# Patient Record
Sex: FEMALE | Race: WHITE | NOT HISPANIC OR LATINO | Employment: OTHER | URBAN - METROPOLITAN AREA
[De-identification: names, ages, dates, MRNs, and addresses within clinical notes are randomized per-mention and may not be internally consistent; named-entity substitution may affect disease eponyms.]

---

## 2017-01-04 ENCOUNTER — TELEPHONE (OUTPATIENT)
Dept: UROLOGY | Facility: CLINIC | Age: 62
End: 2017-01-04

## 2017-01-04 NOTE — TELEPHONE ENCOUNTER
Patient rescheduled her appointment for tomorrow.  Her  was scheduled in her place due to urological issues.

## 2017-01-04 NOTE — TELEPHONE ENCOUNTER
----- Message from Emmie Lacy sent at 1/4/2017  9:15 AM CST -----  Contact: Patient  Patient called requesting that her  receive her appointment which is schedule for tomorrow. Please call back to confirm at [phone]. thanks

## 2017-01-11 ENCOUNTER — HISTORICAL (OUTPATIENT)
Dept: ADMINISTRATIVE | Facility: HOSPITAL | Age: 62
End: 2017-01-11

## 2017-01-11 LAB
ALBUMIN SERPL-MCNC: 4.4 G/DL (ref 3.1–4.7)
ALP SERPL-CCNC: 63 IU/L (ref 40–104)
ALT (SGPT): 32 IU/L (ref 3–33)
AST SERPL-CCNC: 25 IU/L (ref 10–40)
BASOPHILS NFR BLD: 0.1 K/UL (ref 0–0.2)
BASOPHILS NFR BLD: 1.4 %
BILIRUB SERPL-MCNC: 0.4 MG/DL (ref 0.3–1)
BUN SERPL-MCNC: 23 MG/DL (ref 8–20)
CALCIUM SERPL-MCNC: 9.4 MG/DL (ref 7.7–10.4)
CHLORIDE: 104 MMOL/L (ref 98–110)
CO2 SERPL-SCNC: 27.5 MMOL/L (ref 22.8–31.6)
CREATININE: 0.76 MG/DL (ref 0.6–1.4)
EOSINOPHIL NFR BLD: 0.2 K/UL (ref 0–0.7)
EOSINOPHIL NFR BLD: 2.7 %
ERYTHROCYTE [DISTWIDTH] IN BLOOD BY AUTOMATED COUNT: 14 % (ref 11.7–14.9)
GLUCOSE: 87 MG/DL (ref 70–99)
GRAN #: 3.4 K/UL (ref 1.4–6.5)
GRAN%: 54.7 %
HCT VFR BLD AUTO: 41.2 % (ref 36–48)
HGB BLD-MCNC: 13.1 G/DL (ref 12–15)
IMMATURE GRANS (ABS): 0 K/UL (ref 0–1)
IMMATURE GRANULOCYTES: 0.3 %
LYMPH #: 2.1 K/UL (ref 1.2–3.4)
LYMPH%: 32.8 %
MCH RBC QN AUTO: 28.2 PG (ref 25–35)
MCHC RBC AUTO-ENTMCNC: 31.8 G/DL (ref 31–36)
MCV RBC AUTO: 88.6 FL (ref 79–98)
MONO #: 0.5 K/UL (ref 0.1–0.6)
MONO%: 8.1 %
NUCLEATED RBCS: 0 %
PLATELET # BLD AUTO: 318 K/UL (ref 140–440)
PMV BLD AUTO: 11 FL (ref 8.8–12.7)
POTASSIUM SERPL-SCNC: 4.2 MMOL/L (ref 3.5–5)
PROT SERPL-MCNC: 7.3 G/DL (ref 6–8.2)
RBC # BLD AUTO: 4.65 M/UL (ref 3.5–5.5)
SODIUM: 140 MMOL/L (ref 134–144)
WBC # BLD AUTO: 6.3 K/UL (ref 5–10)

## 2017-02-15 ENCOUNTER — OFFICE VISIT (OUTPATIENT)
Dept: UROLOGY | Facility: CLINIC | Age: 62
End: 2017-02-15
Payer: COMMERCIAL

## 2017-02-15 VITALS
TEMPERATURE: 98 F | HEART RATE: 65 BPM | SYSTOLIC BLOOD PRESSURE: 149 MMHG | BODY MASS INDEX: 26.84 KG/M2 | WEIGHT: 167 LBS | DIASTOLIC BLOOD PRESSURE: 78 MMHG | HEIGHT: 66 IN

## 2017-02-15 DIAGNOSIS — N30.80 CYSTITIS CYSTICA: ICD-10-CM

## 2017-02-15 DIAGNOSIS — N30.00 ACUTE CYSTITIS WITHOUT HEMATURIA: Primary | ICD-10-CM

## 2017-02-15 LAB
BILIRUB SERPL-MCNC: ABNORMAL MG/DL
BLOOD URINE, POC: ABNORMAL
COLOR, POC UA: ABNORMAL
GLUCOSE UR QL STRIP: ABNORMAL
KETONES UR QL STRIP: ABNORMAL
LEUKOCYTE ESTERASE URINE, POC: ABNORMAL
NITRITE, POC UA: POSITIVE
PH, POC UA: 6
PROTEIN, POC: ABNORMAL
SPECIFIC GRAVITY, POC UA: 1.01
UROBILINOGEN, POC UA: ABNORMAL

## 2017-02-15 PROCEDURE — 99999 PR PBB SHADOW E&M-EST. PATIENT-LVL III: CPT | Mod: PBBFAC,,, | Performed by: UROLOGY

## 2017-02-15 PROCEDURE — 99213 OFFICE O/P EST LOW 20 MIN: CPT | Mod: 25,S$GLB,, | Performed by: UROLOGY

## 2017-02-15 PROCEDURE — 87077 CULTURE AEROBIC IDENTIFY: CPT

## 2017-02-15 PROCEDURE — 87086 URINE CULTURE/COLONY COUNT: CPT

## 2017-02-15 PROCEDURE — 87186 SC STD MICRODIL/AGAR DIL: CPT

## 2017-02-15 PROCEDURE — 81002 URINALYSIS NONAUTO W/O SCOPE: CPT | Mod: S$GLB,,, | Performed by: UROLOGY

## 2017-02-15 PROCEDURE — 87088 URINE BACTERIA CULTURE: CPT

## 2017-02-15 RX ORDER — AMOXICILLIN 500 MG
2 CAPSULE ORAL DAILY
COMMUNITY
End: 2019-05-02

## 2017-02-15 NOTE — PROGRESS NOTES
OFFICE NOTE    CHIEF COMPLAINT:  History of interstitial cystitis cystica.    Ms. Reeves is a 61-year-old female with the above diagnosis, being managed with   Myrbetriq 50 mg p.o. daily and also she takes Macrobid when she becomes   symptomatic.  She refers that the Macrobid is taking twice a day for a week and   usually the dysuria disappears unresolved.  The Pyridium is taken only for a   couple of days when needed.  At the present time, still has some symptoms with   nocturia x4.  During the day, she urinates at least six times a day, have an   occasional urgency and no gross hematuria.  The remaining of the medical and   surgical history have changed.  She recently underwent a right bunion surgery   and is recuperating and doing rehab for her right lower extremity.  The rest of   the medical and surgical history have changed and is well documented in the   medical record and that was reviewed during this visit.    The urinalysis today shows positive leukocytes, positive nitrites and a trace of   blood.  The urine is cloudy and have a strong odor.    I explained to the patient that these findings are suggesting that she may have   actually an active urinary tract infection.  I suggest that she do not take any   medication at the present time, since she is asymptomatic, that we do a urine   culture and sensitivity and we decide depending on that, what is the best   antibiotic for treating this infection.  In the meantime, she will be taking   Myrbetriq 50 mg daily.  After we resolve this infection, she will be followed up   in a year.  All the questions were answered at her satisfaction and she left   the office in satisfactory condition.    I spent approximately 30 minutes with Ms. Reeves and all the time was spent on   counseling and she left the office in satisfactory condition.      EOR/PN  dd: 02/15/2017 10:16:56 (CST)  td: 02/15/2017 13:09:11 (CST)  Doc ID   #[id number]  Job ID #[id number]    CC:

## 2017-02-15 NOTE — MR AVS SNAPSHOT
[facility]   [address]  [place] [address]  Phone: [phone]                  Eboni Reeves   2/15/2017 9:30 AM   Office Visit    Description:  Female : 1955   Provider:  Cb Noguera MD   Department:  [facility]           Reason for Visit     6 mth recheck           Diagnoses this Visit        Comments    Acute cystitis without hematuria    -  Primary     Cystitis cystica                To Do List           Goals (5 Years of Data)     None       These Medications        Disp Refills Start End    mirabegron 50 mg Tb24 90 tablet 3 2/15/2017 2/15/2018    Take 1 tablet (50 mg total) by mouth once daily. - Oral    Pharmacy: [id number] - [place], [place] - [address] #: [phone]         [facility]     [facility] On Call Nurse Care Line -  Assistance  Registered nurses in the [facility] Call Center provide clinical advisement, health education, appointment booking, and other advisory services.  Call for this free service at [phone].             Medications           Message regarding Medications     Verify the changes and/or additions to your medication regime listed below are the same as discussed with your clinician today.  If any of these changes or additions are incorrect, please notify your healthcare provider.        STOP taking these medications     alendronate (FOSAMAX) 70 MG tablet Take 70 mg by mouth every 7 days.     NAPROXEN SODIUM (ALEVE ORAL) Take 1 tablet by mouth daily as needed.           Verify that the below list of medications is an accurate representation of the medications you are currently taking.  If none reported, the list may be blank. If incorrect, please contact your healthcare provider. Carry this list with you in case of emergency.           Current Medications     BIFIDOBACTERIUM INFANTIS (ALIGN ORAL) Take 2 capsules by mouth once daily.    CALCIUM CARBONATE/VITAMIN D3 (VITAMIN D-3 ORAL) Take  "1 tablet by mouth once daily.      fish oil-omega-3 fatty acids 300-1,000 mg capsule Take 2 g by mouth once daily.    mirabegron 50 mg Tb24 Take 1 tablet (50 mg total) by mouth once daily.    multivitamin (ONE DAILY MULTIVITAMIN) per tablet Take 1 tablet by mouth once daily.     nitrofurantoin (MACRODANTIN) 100 MG capsule Take 1 capsule (100 mg total) by mouth nightly.    RESTASIS 0.05 % ophthalmic emulsion Place 1 drop into both eyes 2 (two) times daily.     cranberry extract 650 mg Cap Take 1 tablet by mouth nightly.           Clinical Reference Information           Your Vitals Were     BP Pulse Temp Height Weight BMI    149/78 (BP Location: Left arm, Patient Position: Sitting, BP Method: Automatic) 65 97.8 °F (36.6 °C) (Oral) 5' 6" (1.676 m) 75.8 kg (167 lb) 26.95 kg/m2      Blood Pressure          Most Recent Value    BP  (!)  149/78      Allergies as of 2/15/2017     No Known Allergies      Immunizations Administered on Date of Encounter - 2/15/2017     None      Orders Placed During Today's Visit      Normal Orders This Visit    POCT URINE DIPSTICK WITHOUT MICROSCOPE     Urine culture       Language Assistance Services     ATTENTION: Language assistance services are available, free of charge. Please call [phone].      ATENCIÓN: Si delfin sykes, tiene a jackson disposición servicios gratuitos de asistencia lingüística. Llame al [phone].     AKI Ý: N?u b?n nói Ti?ng Vi?t, có các d?ch v? h? tr? ngôn ng? mi?n phí dành cho b?n. G?i s? [phone].         [facility] complies with applicable Federal civil rights laws and does not discriminate on the basis of race, color, national origin, age, disability, or sex.        "

## 2017-02-18 LAB — BACTERIA UR CULT: NORMAL

## 2017-05-23 RX ORDER — MIRABEGRON 50 MG/1
TABLET, FILM COATED, EXTENDED RELEASE ORAL
Qty: 90 TABLET | Refills: 3 | Status: SHIPPED | OUTPATIENT
Start: 2017-05-23 | End: 2018-06-05 | Stop reason: SDUPTHER

## 2017-05-23 NOTE — TELEPHONE ENCOUNTER
----- Message from Rich Zeng sent at 5/23/2017 10:35 AM CDT -----  Contact: self   Patient need a refill on her medication you wrote for her please send to [facility], any questions please call back at [phone] (home)     [id number] - [place], [place] - [address] [address]  Phone: [phone] Fax: [phone]

## 2017-09-12 DIAGNOSIS — N30.80 CYSTITIS CYSTICA: ICD-10-CM

## 2017-09-12 DIAGNOSIS — N32.81 OVERACTIVE BLADDER: ICD-10-CM

## 2017-09-12 NOTE — TELEPHONE ENCOUNTER
----- Message from Aydin Yin sent at 9/12/2017 12:47 PM CDT -----  Contact: pt  Pt is requesting a refill on her Macrodantin 100mg  Call Back#[phone]  Thanks    [id number] - [place], [place] - [address] [address]  Phone: [phone] Fax: [phone]

## 2017-09-15 RX ORDER — NITROFURANTOIN (MACROCRYSTALS) 100 MG/1
100 CAPSULE ORAL NIGHTLY
Qty: 90 CAPSULE | Refills: 3 | Status: SHIPPED | OUTPATIENT
Start: 2017-09-15 | End: 2019-05-02

## 2018-06-05 RX ORDER — MIRABEGRON 50 MG/1
TABLET, FILM COATED, EXTENDED RELEASE ORAL
Qty: 90 TABLET | Refills: 3 | Status: SHIPPED | OUTPATIENT
Start: 2018-06-05 | End: 2019-06-18 | Stop reason: SDUPTHER

## 2019-05-02 ENCOUNTER — OFFICE VISIT (OUTPATIENT)
Dept: PODIATRY | Facility: CLINIC | Age: 64
End: 2019-05-02
Payer: COMMERCIAL

## 2019-05-02 VITALS
DIASTOLIC BLOOD PRESSURE: 87 MMHG | SYSTOLIC BLOOD PRESSURE: 136 MMHG | BODY MASS INDEX: 26.84 KG/M2 | HEIGHT: 66 IN | HEART RATE: 67 BPM | WEIGHT: 167 LBS

## 2019-05-02 DIAGNOSIS — G60.9 IDIOPATHIC PERIPHERAL NEUROPATHY: Primary | ICD-10-CM

## 2019-05-02 PROCEDURE — 99213 OFFICE O/P EST LOW 20 MIN: CPT | Mod: ,,, | Performed by: PODIATRIST

## 2019-05-02 PROCEDURE — 3008F PR BODY MASS INDEX (BMI) DOCUMENTED: ICD-10-PCS | Mod: ,,, | Performed by: PODIATRIST

## 2019-05-02 PROCEDURE — 99213 PR OFFICE/OUTPT VISIT, EST, LEVL III, 20-29 MIN: ICD-10-PCS | Mod: ,,, | Performed by: PODIATRIST

## 2019-05-02 PROCEDURE — 3008F BODY MASS INDEX DOCD: CPT | Mod: ,,, | Performed by: PODIATRIST

## 2019-05-02 RX ORDER — CHOLECALCIFEROL (VITAMIN D3) 125 MCG
CAPSULE ORAL
COMMUNITY
End: 2019-06-18

## 2019-05-02 NOTE — PROGRESS NOTES
[address] [facility] [address]  [place] [address]  Phone: [phone]   [phone]    Patient's PCP:Domo Gonzalez MD  Referring Provider: No ref. provider found    Subjective:      Chief Complaint:: Foot Problem (bilateral feet get cold)    HPI  Eboni Reeves is a 63 y.o. female who presents with a complaint of  Bilateral feet get really cold at night and when they start to warm up it is very painful lasting for 3-4 months. Onset of the symptoms was none.  Current symptoms include pain.When they start to warm up it is very painful, feels like it does when your hands are frozen and thaw out.  Aggravating factors are none. Symptoms have stayed the sme.Treatment to date have included aleve. Patients rates pain 8/10 on pain scale.        Vitals:    05/02/19 1101   BP: 136/87   Pulse: 67     Shoe Size:     Past Surgical History:   Procedure Laterality Date    APPENDECTOMY      BLADDER SURGERY      CYSTOSCOPY bladder distension N/A 4/26/2016    [facility]    DILATION-URETHRA  4/26/2016    [facility]    HYSTERECTOMY      partial hysterectomy    MANDIBLE FRACTURE SURGERY      TONSILLECTOMY      WRIST SURGERY      right wrist, had plate put in     Past Medical History:   Diagnosis Date    DVT (deep venous thrombosis)     Encounter for blood transfusion     Fx     RIGHT ANKLE    Stroke     TMJ (dislocation of temporomandibular joint)     Urinary tract infection      Family History   Problem Relation Age of Onset    Urolithiasis Father     Prostate cancer Paternal Uncle     Kidney cancer Neg Hx         Social History:   Marital Status:   Alcohol History:  reports that she drinks alcohol.  Tobacco History:  reports that she has never smoked. She does not have any smokeless tobacco history on file.  Drug History:  reports that she does not use drugs.    Review of patient's allergies indicates:   Allergen Reactions    Codeine  Nausea Only       Current Outpatient Medications   Medication Sig Dispense Refill    MYRBETRIQ 50 mg Tb24 TAKE ONE TABLET BY MOUTH ONCE DAILY 90 tablet 3    naproxen sodium (ALEVE) 220 mg Cap Aleve   prn       No current facility-administered medications for this visit.        Review of Systems      Objective:        Physical Exam:   Foot Exam    General  General Appearance: appears stated age and healthy   Orientation: alert and oriented to person, place, and time   Affect: appropriate   Gait: unimpaired       Right Foot/Ankle     Inspection and Palpation  Ecchymosis: none  Tenderness: none   Swelling: none   Arch: normal  Hammertoes: absent  Claw Toes: absent  Hallux valgus: no  Hallux limitus: no  Skin Exam: skin intact;     Neurovascular  Dorsalis pedis: 2+  Posterior tibial: 2+  Saphenous nerve sensation: normal  Tibial nerve sensation: normal  Superficial peroneal nerve sensation: normal  Deep peroneal nerve sensation: normal  Sural nerve sensation: normal    Muscle Strength  Ankle dorsiflexion: 5  Ankle plantar flexion: 5  Ankle inversion: 5  Ankle eversion: 5  Great toe extension: 5  Great toe flexion: 5    Comments  Toes on the right foot are cold to touch with no vasomotor changes or ulcerations.  Neurologically sensation is intact    Left Foot/Ankle      Inspection and Palpation  Ecchymosis: none  Tenderness: none   Swelling: none   Arch: normal  Hammertoes: absent  Claw toes: absent  Hallux valgus: no  Hallux limitus: no  Skin Exam: skin intact;     Neurovascular  Dorsalis pedis: 2+  Posterior tibial: 2+  Saphenous nerve sensation: normal  Tibial nerve sensation: normal  Superficial peroneal nerve sensation: normal  Deep peroneal nerve sensation: normal  Sural nerve sensation: normal    Muscle Strength  Ankle dorsiflexion: 5  Ankle plantar flexion: 5  Ankle inversion: 5  Ankle eversion: 5  Great toe extension: 5  Great toe flexion: 5    Comments  The toes on the left foot cold to touch with no  vasomotor changes or ulcerations.  Neurologically sensation is intact    Physical Exam   Cardiovascular:   Pulses:       Dorsalis pedis pulses are 2+ on the right side, and 2+ on the left side.        Posterior tibial pulses are 2+ on the right side, and 2+ on the left side.       Imaging:            Assessment:       1. Idiopathic peripheral neuropathy      Plan:   Idiopathic peripheral neuropathy    No treatment needed today. Patient will take Aleve at night to help with inflammatory changes that she feels at night we discussed future treatment with gabapentin or Neurontin) neurologically she becomes more uncomfortable. Also discussed NCV EMG neurological evaluation. Return as needed  No follow-ups on file.    Procedures - None    Counseling:   Patient  was made aware of inspecting their feet.  Patient was told to be aware of any breaks in the skin or redness.  With neuropathy, these areas are not recognized early due to the numbness.  I discussed the lab test and NCV EMG test and also the role of the neurologist in evaluating the patient.  I discussed Diabetes, lower back issues, metabolic disorders, systemic causes, chemotherapy, viatmain defiencey, heavy metal exposure, as some of the causes.  I also explained that as much as 40% of the time we can not find a cause.  I discussed different treatments available to control the symptoms but whcih may not cure the problem.  I provided patient education verbally regarding:   Patient diagnosis, treatment options, as well as alternatives, risks, and benefits.     This note was created using Dragon voice recognition software that occasionally misinterpreted phrases or words.

## 2019-06-12 RX ORDER — MIRABEGRON 50 MG/1
TABLET, FILM COATED, EXTENDED RELEASE ORAL
Qty: 90 TABLET | Refills: 3 | OUTPATIENT
Start: 2019-06-12

## 2019-06-18 ENCOUNTER — OFFICE VISIT (OUTPATIENT)
Dept: UROLOGY | Facility: CLINIC | Age: 64
End: 2019-06-18
Payer: COMMERCIAL

## 2019-06-18 VITALS
DIASTOLIC BLOOD PRESSURE: 80 MMHG | HEART RATE: 73 BPM | WEIGHT: 171.94 LBS | RESPIRATION RATE: 18 BRPM | SYSTOLIC BLOOD PRESSURE: 143 MMHG | BODY MASS INDEX: 27.63 KG/M2 | TEMPERATURE: 98 F | HEIGHT: 66 IN

## 2019-06-18 DIAGNOSIS — N30.10 IC (INTERSTITIAL CYSTITIS): Primary | ICD-10-CM

## 2019-06-18 DIAGNOSIS — R35.0 URINARY FREQUENCY: ICD-10-CM

## 2019-06-18 DIAGNOSIS — R39.15 URINARY URGENCY: ICD-10-CM

## 2019-06-18 DIAGNOSIS — N32.81 OAB (OVERACTIVE BLADDER): ICD-10-CM

## 2019-06-18 LAB
BILIRUB SERPL-MCNC: ABNORMAL MG/DL
BLOOD URINE, POC: ABNORMAL
COLOR, POC UA: YELLOW
GLUCOSE UR QL STRIP: ABNORMAL
KETONES UR QL STRIP: ABNORMAL
LEUKOCYTE ESTERASE URINE, POC: ABNORMAL
NITRITE, POC UA: ABNORMAL
PH, POC UA: 5
PROTEIN, POC: ABNORMAL
SPECIFIC GRAVITY, POC UA: 1.02
UROBILINOGEN, POC UA: 0.2

## 2019-06-18 PROCEDURE — 99999 PR PBB SHADOW E&M-EST. PATIENT-LVL III: ICD-10-PCS | Mod: PBBFAC,,, | Performed by: NURSE PRACTITIONER

## 2019-06-18 PROCEDURE — 99214 OFFICE O/P EST MOD 30 MIN: CPT | Mod: 25,S$GLB,, | Performed by: NURSE PRACTITIONER

## 2019-06-18 PROCEDURE — 99999 PR PBB SHADOW E&M-EST. PATIENT-LVL III: CPT | Mod: PBBFAC,,, | Performed by: NURSE PRACTITIONER

## 2019-06-18 PROCEDURE — 99214 PR OFFICE/OUTPT VISIT, EST, LEVL IV, 30-39 MIN: ICD-10-PCS | Mod: 25,S$GLB,, | Performed by: NURSE PRACTITIONER

## 2019-06-18 PROCEDURE — 3008F BODY MASS INDEX DOCD: CPT | Mod: CPTII,S$GLB,, | Performed by: NURSE PRACTITIONER

## 2019-06-18 PROCEDURE — 3008F PR BODY MASS INDEX (BMI) DOCUMENTED: ICD-10-PCS | Mod: CPTII,S$GLB,, | Performed by: NURSE PRACTITIONER

## 2019-06-18 PROCEDURE — 81002 POCT URINE DIPSTICK WITHOUT MICROSCOPE: ICD-10-PCS | Mod: S$GLB,,, | Performed by: NURSE PRACTITIONER

## 2019-06-18 PROCEDURE — 81002 URINALYSIS NONAUTO W/O SCOPE: CPT | Mod: S$GLB,,, | Performed by: NURSE PRACTITIONER

## 2019-06-18 NOTE — PROGRESS NOTES
[facility] Note  Staff: VIRGEN ByrdC    PCP: Dr. Domo Gonzalez    Chief Complaint: Annual Exam-Cystitis Cystica    Subjective:        HPI: Eboni Reeves is a 63 y.o. female presents today for her annual exam.  Pt has history of interstitial cystitis cystica and was previously established with Dr. Noguera.  Last ov was 02/15/2017.  Pt doing well today with no complaints voiced.    Ms. Reeves is a 63-year-old female with the above diagnosis, being managed with   Myrbetriq 50 mg p.o. daily and also she takes Macrobid when she becomes   symptomatic.  She refers that the Macrobid is taking twice a day for a week and   usually the dysuria disappears unresolved.  The Pyridium is taken only for a   couple of days when needed.    The rest of   the medical and surgical history have not changed and is well documented in the   medical record and that was reviewed during this visit.    Last procedure done by Chuckie was on 4/26/2016:  Cystoscopy with bladder hydrodistention and dilation of Urethra with no complications.     The urinalysis today shows trace of WBCs, all else is normal findings.     REVIEW OF SYSTEMS:  A comprehensive 10 system review was performed and is negative except as noted above in HPI    PMHx:  Past Medical History:   Diagnosis Date    DVT (deep venous thrombosis)     Encounter for blood transfusion     Fx     RIGHT ANKLE    Stroke     TMJ (dislocation of temporomandibular joint)     Urinary tract infection      PSHx:  Past Surgical History:   Procedure Laterality Date    APPENDECTOMY      BLADDER SURGERY      CYSTOSCOPY bladder distension N/A 4/26/2016    [facility]    DILATION-URETHRA  4/26/2016    [facility]    HYSTERECTOMY      partial hysterectomy    MANDIBLE FRACTURE SURGERY      TONSILLECTOMY      WRIST SURGERY      right wrist, had plate put in     Allergies:  Codeine    Medications:  reviewed   Objective:     Vitals:    06/18/19 0945   BP: (!) 143/80   Pulse: 73   Resp: 18   Temp: 98.4 °F (36.9 °C)     Physical Exam   Vitals reviewed.  Constitutional: She is oriented to person, place, and time. She appears well-developed and well-nourished.   HENT:   Head: Normocephalic and atraumatic.   Eyes: Conjunctivae and EOM are normal. Pupils are equal, round, and reactive to light.   Neck: Normal range of motion. Neck supple.   Cardiovascular: Normal rate, regular rhythm, normal heart sounds and intact distal pulses.    Pulmonary/Chest: Effort normal and breath sounds normal.   Abdominal: Soft. Bowel sounds are normal.   Musculoskeletal: Normal range of motion.   Neurological: She is alert and oriented to person, place, and time. She has normal reflexes.   Skin: Skin is warm and dry.     Psychiatric: She has a normal mood and affect. Her behavior is normal. Judgment and thought content normal.     Assessment:       1. IC (interstitial cystitis)    2. OAB (overactive bladder)    3. Urinary frequency    4. Urinary urgency          Plan:     Myrbetriq 50 mg daily refilled for pt during ov today.  F/u with me in one year.    [facility]: N/A    Monalisa Marin, ANN

## 2019-06-21 ENCOUNTER — TELEPHONE (OUTPATIENT)
Dept: PULMONOLOGY | Facility: CLINIC | Age: 64
End: 2019-06-21

## 2019-06-21 NOTE — TELEPHONE ENCOUNTER
Since April she has developed thsi cough an causing her not to be able to sleep at night . She does not have a appt until July 23

## 2019-06-21 NOTE — TELEPHONE ENCOUNTER
Pt scheduled for sooner appt with NP for cough   ----- Message from Malaika Bernard sent at 6/21/2019  2:57 PM CDT -----  Type:  Sooner Apoointment Request    Caller is requesting a sooner appointment.  Caller declined first available appointment listed below.  Caller will not accept being placed on the waitlist and is requesting a message be sent to doctor.    Name of Caller:  patient  When is the first available appointment?  7/25  Symptoms:  Bad cough since April   Best Call Back Number:  [phone]  Additional Information: requesting sooner appt

## 2019-06-27 ENCOUNTER — OFFICE VISIT (OUTPATIENT)
Dept: PULMONOLOGY | Facility: CLINIC | Age: 64
End: 2019-06-27
Payer: COMMERCIAL

## 2019-06-27 VITALS
HEIGHT: 66 IN | WEIGHT: 173.06 LBS | OXYGEN SATURATION: 96 % | DIASTOLIC BLOOD PRESSURE: 74 MMHG | HEART RATE: 65 BPM | SYSTOLIC BLOOD PRESSURE: 132 MMHG | BODY MASS INDEX: 27.81 KG/M2

## 2019-06-27 DIAGNOSIS — R05.9 COUGH IN ADULT: ICD-10-CM

## 2019-06-27 DIAGNOSIS — J45.21 MILD INTERMITTENT ASTHMA WITH ACUTE EXACERBATION: Primary | ICD-10-CM

## 2019-06-27 PROCEDURE — 3008F BODY MASS INDEX DOCD: CPT | Mod: CPTII,S$GLB,, | Performed by: NURSE PRACTITIONER

## 2019-06-27 PROCEDURE — 99205 PR OFFICE/OUTPT VISIT, NEW, LEVL V, 60-74 MIN: ICD-10-PCS | Mod: S$GLB,,, | Performed by: NURSE PRACTITIONER

## 2019-06-27 PROCEDURE — 3008F PR BODY MASS INDEX (BMI) DOCUMENTED: ICD-10-PCS | Mod: CPTII,S$GLB,, | Performed by: NURSE PRACTITIONER

## 2019-06-27 PROCEDURE — 99999 PR PBB SHADOW E&M-EST. PATIENT-LVL III: CPT | Mod: PBBFAC,,, | Performed by: NURSE PRACTITIONER

## 2019-06-27 PROCEDURE — 99205 OFFICE O/P NEW HI 60 MIN: CPT | Mod: S$GLB,,, | Performed by: NURSE PRACTITIONER

## 2019-06-27 PROCEDURE — 99999 PR PBB SHADOW E&M-EST. PATIENT-LVL III: ICD-10-PCS | Mod: PBBFAC,,, | Performed by: NURSE PRACTITIONER

## 2019-06-27 RX ORDER — MONTELUKAST SODIUM 10 MG/1
10 TABLET ORAL NIGHTLY
Qty: 30 TABLET | Refills: 11 | Status: SHIPPED | OUTPATIENT
Start: 2019-06-27 | End: 2019-07-27

## 2019-06-27 RX ORDER — PREDNISONE 20 MG/1
TABLET ORAL
Qty: 18 TABLET | Refills: 0 | Status: SHIPPED | OUTPATIENT
Start: 2019-06-27 | End: 2019-11-08

## 2019-06-27 RX ORDER — PHENTERMINE HYDROCHLORIDE 37.5 MG/1
TABLET ORAL
Refills: 3 | COMMUNITY
Start: 2019-06-24 | End: 2019-06-27

## 2019-06-27 RX ORDER — AZITHROMYCIN 500 MG/1
500 TABLET, FILM COATED ORAL DAILY
Qty: 3 TABLET | Refills: 0 | Status: SHIPPED | OUTPATIENT
Start: 2019-06-27 | End: 2019-11-08

## 2019-06-27 RX ORDER — BUDESONIDE AND FORMOTEROL FUMARATE DIHYDRATE 160; 4.5 UG/1; UG/1
2 AEROSOL RESPIRATORY (INHALATION) EVERY 12 HOURS
Qty: 1 INHALER | Refills: 11 | Status: SHIPPED | OUTPATIENT
Start: 2019-06-27 | End: 2019-07-29
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
## 2019-07-08 ENCOUNTER — HOSPITAL ENCOUNTER (OUTPATIENT)
Dept: RESPIRATORY THERAPY | Facility: HOSPITAL | Age: 64
Discharge: HOME | End: 2019-07-08
Attending: NURSE PRACTITIONER
Payer: COMMERCIAL

## 2019-07-08 DIAGNOSIS — J45.21 MILD INTERMITTENT ASTHMA WITH ACUTE EXACERBATION: ICD-10-CM

## 2019-07-08 DIAGNOSIS — R05.9 COUGH IN ADULT: ICD-10-CM

## 2019-07-08 LAB
BRPFT: ABNORMAL
DLCO ADJ PRE: 17.16 ML/(MIN*MMHG) (ref 17.76–29.22)
DLCO SINGLE BREATH LLN: 17.76
DLCO SINGLE BREATH PRE REF: 73.1 %
DLCO SINGLE BREATH REF: 23.49
DLCOC SBVA LLN: 3.07
DLCOC SBVA PRE REF: 92.9 %
DLCOC SBVA REF: 4.43
DLCOC SINGLE BREATH LLN: 17.76
DLCOC SINGLE BREATH PRE REF: 73.1 %
DLCOC SINGLE BREATH REF: 23.49
DLCOVA LLN: 3.07
DLCOVA PRE REF: 92.9 %
DLCOVA PRE: 4.12 ML/(MIN*MMHG*L) (ref 3.07–5.8)
DLCOVA REF: 4.43
DLVAADJ PRE: 4.12 ML/(MIN*MMHG*L) (ref 3.07–5.8)
ERVN2 LLN: 0.76
ERVN2 PRE REF: 51.7 %
ERVN2 PRE: 0.39 L (ref 0.76–0.76)
ERVN2 REF: 0.76
FEF 25 75 CHG: 18.5 %
FEF 25 75 LLN: 1.08
FEF 25 75 POST REF: 80.7 %
FEF 25 75 PRE REF: 68.1 %
FEF 25 75 REF: 2.2
FET100 CHG: -1.5 %
FEV1 CHG: 1.2 %
FEV1 FVC CHG: 3.7 %
FEV1 FVC LLN: 66
FEV1 FVC POST REF: 97.4 %
FEV1 FVC PRE REF: 93.9 %
FEV1 FVC REF: 79
FEV1 LLN: 1.9
FEV1 POST REF: 81.8 %
FEV1 PRE REF: 80.9 %
FEV1 REF: 2.55
FRCN2 LLN: 2
FRCN2 PRE REF: 77.1 %
FRCN2 REF: 2.83
FVC CHG: -2.5 %
FVC LLN: 2.45
FVC POST REF: 83.4 %
FVC PRE REF: 85.5 %
FVC REF: 3.28
IVC PRE: 2.63 L (ref 2.45–4.11)
IVC SINGLE BREATH LLN: 2.45
IVC SINGLE BREATH PRE REF: 80.1 %
IVC SINGLE BREATH REF: 3.28
MVV LLN: 83
MVV PRE REF: 74.9 %
MVV REF: 97
PEF CHG: 3.6 %
PEF LLN: 4.57
PEF POST REF: 83 %
PEF PRE REF: 80.2 %
PEF REF: 6.39
POST FEF 25 75: 1.78 L/S (ref 1.08–3.32)
POST FET 100: 8.73 SEC
POST FEV1 FVC: 76.54 % (ref 66.14–91.04)
POST FEV1: 2.09 L (ref 1.9–3.21)
POST FVC: 2.73 L (ref 2.45–4.11)
POST PEF: 5.31 L/S (ref 4.57–8.22)
PRE DLCO: 17.16 ML/(MIN*MMHG) (ref 17.76–29.22)
PRE FEF 25 75: 1.5 L/S (ref 1.08–3.32)
PRE FET 100: 8.86 SEC
PRE FEV1 FVC: 73.8 % (ref 66.14–91.04)
PRE FEV1: 2.07 L (ref 1.9–3.21)
PRE FRC N2: 2.18 L
PRE FVC: 2.8 L (ref 2.45–4.11)
PRE MVV: 73 L/MIN (ref 82.82–112.06)
PRE PEF: 5.13 L/S (ref 4.57–8.22)
RVN2 LLN: 1.49
RVN2 PRE REF: 86.3 %
RVN2 PRE: 1.78 L (ref 1.49–2.64)
RVN2 REF: 2.06
RVN2TLCN2 LLN: 31.13
RVN2TLCN2 PRE REF: 95.5 %
RVN2TLCN2 PRE: 38.88 % (ref 31.13–50.31)
RVN2TLCN2 REF: 40.72
TLCN2 LLN: 4.31
TLCN2 PRE REF: 86.5 %
TLCN2 PRE: 4.58 L (ref 4.31–6.29)
TLCN2 REF: 5.3
VA PRE: 4.17 L (ref 5.15–5.15)
VA SINGLE BREATH LLN: 5.15
VA SINGLE BREATH PRE REF: 81 %
VA SINGLE BREATH REF: 5.15
VCMAXN2 LLN: 2.45
VCMAXN2 PRE REF: 85.5 %
VCMAXN2 PRE: 2.8 L (ref 2.45–4.11)
VCMAXN2 REF: 3.28

## 2019-07-08 PROCEDURE — 94729 DIFFUSING CAPACITY: CPT

## 2019-07-08 PROCEDURE — 94729 PR C02/MEMBANE DIFFUSE CAPACITY: ICD-10-PCS | Mod: 26,,, | Performed by: INTERNAL MEDICINE

## 2019-07-08 PROCEDURE — 94727 GAS DIL/WSHOT DETER LNG VOL: CPT

## 2019-07-08 PROCEDURE — 94727 PR PULM FUNCTION TEST BY GAS: ICD-10-PCS | Mod: 26,,, | Performed by: INTERNAL MEDICINE

## 2019-07-08 PROCEDURE — 94727 GAS DIL/WSHOT DETER LNG VOL: CPT | Mod: 26,,, | Performed by: INTERNAL MEDICINE

## 2019-07-08 PROCEDURE — 94729 DIFFUSING CAPACITY: CPT | Mod: 26,,, | Performed by: INTERNAL MEDICINE

## 2019-07-08 PROCEDURE — 94060 PR EVAL OF BRONCHOSPASM: ICD-10-PCS | Mod: 26,,, | Performed by: INTERNAL MEDICINE

## 2019-07-08 PROCEDURE — 94060 EVALUATION OF WHEEZING: CPT | Mod: 26,,, | Performed by: INTERNAL MEDICINE

## 2019-07-08 PROCEDURE — 94060 EVALUATION OF WHEEZING: CPT

## 2019-07-09 ENCOUNTER — TELEPHONE (OUTPATIENT)
Dept: PULMONOLOGY | Facility: CLINIC | Age: 64
End: 2019-07-09

## 2019-07-09 NOTE — TELEPHONE ENCOUNTER
Notified patient of results, verbalized understanding. No further action is needed.----- Message from Denisha Lucia NP sent at 7/8/2019  4:48 PM CDT -----  Pulmonary function test is normal.

## 2019-07-29 ENCOUNTER — OFFICE VISIT (OUTPATIENT)
Dept: PULMONOLOGY | Facility: CLINIC | Age: 64
End: 2019-07-29
Payer: COMMERCIAL

## 2019-07-29 VITALS
BODY MASS INDEX: 27.77 KG/M2 | OXYGEN SATURATION: 98 % | WEIGHT: 172.81 LBS | HEIGHT: 66 IN | HEART RATE: 64 BPM | DIASTOLIC BLOOD PRESSURE: 76 MMHG | SYSTOLIC BLOOD PRESSURE: 135 MMHG

## 2019-07-29 DIAGNOSIS — G47.33 OBSTRUCTIVE SLEEP APNEA: ICD-10-CM

## 2019-07-29 DIAGNOSIS — J45.30 MILD PERSISTENT ASTHMA WITHOUT COMPLICATION: Primary | ICD-10-CM

## 2019-07-29 PROBLEM — J45.20 MILD INTERMITTENT ASTHMA WITHOUT COMPLICATION: Status: ACTIVE | Noted: 2019-06-27

## 2019-07-29 PROCEDURE — 99213 PR OFFICE/OUTPT VISIT, EST, LEVL III, 20-29 MIN: ICD-10-PCS | Mod: S$GLB,,, | Performed by: NURSE PRACTITIONER

## 2019-07-29 PROCEDURE — 99999 PR PBB SHADOW E&M-EST. PATIENT-LVL III: CPT | Mod: PBBFAC,,, | Performed by: NURSE PRACTITIONER

## 2019-07-29 PROCEDURE — 3008F PR BODY MASS INDEX (BMI) DOCUMENTED: ICD-10-PCS | Mod: CPTII,S$GLB,, | Performed by: NURSE PRACTITIONER

## 2019-07-29 PROCEDURE — 99999 PR PBB SHADOW E&M-EST. PATIENT-LVL III: ICD-10-PCS | Mod: PBBFAC,,, | Performed by: NURSE PRACTITIONER

## 2019-07-29 PROCEDURE — 3008F BODY MASS INDEX DOCD: CPT | Mod: CPTII,S$GLB,, | Performed by: NURSE PRACTITIONER

## 2019-07-29 PROCEDURE — 99213 OFFICE O/P EST LOW 20 MIN: CPT | Mod: S$GLB,,, | Performed by: NURSE PRACTITIONER

## 2019-07-29 RX ORDER — MIRABEGRON 50 MG/1
TABLET, FILM COATED, EXTENDED RELEASE ORAL
Refills: 12 | COMMUNITY
Start: 2019-07-22 | End: 2020-03-11 | Stop reason: SDUPTHER

## 2019-07-29 RX ORDER — MONTELUKAST SODIUM 10 MG/1
10 TABLET ORAL NIGHTLY
COMMUNITY
End: 2020-06-30 | Stop reason: SDUPTHER

## 2019-07-29 RX ORDER — FLUTICASONE FUROATE AND VILANTEROL 200; 25 UG/1; UG/1
1 POWDER RESPIRATORY (INHALATION) DAILY
Qty: 1 EACH | Refills: 11 | Status: SHIPPED | OUTPATIENT
Start: 2019-07-29 | End: 2020-08-13 | Stop reason: SDUPTHER

## 2019-07-29 RX ORDER — ALBUTEROL SULFATE 90 UG/1
2 AEROSOL, METERED RESPIRATORY (INHALATION) EVERY 6 HOURS PRN
COMMUNITY
End: 2021-02-25 | Stop reason: SDUPTHER
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
## 2019-07-29 NOTE — PATIENT INSTRUCTIONS
Asthma symptoms have improved with Symbicort but still persistent problem  Will stop Symbicort and start Breo 200 1 puff once a day every day    Pulmonary function test shows no obstructive disease but you are only absorbing 73% of the oxygen you inhale.    Will contact Cardiologist for copy of recent blood work.    Continue Asthma action plan.

## 2019-10-29 ENCOUNTER — OFFICE VISIT (OUTPATIENT)
Dept: PULMONOLOGY | Facility: CLINIC | Age: 64
End: 2019-10-29
Payer: COMMERCIAL

## 2019-10-29 VITALS
HEIGHT: 66 IN | BODY MASS INDEX: 28.7 KG/M2 | DIASTOLIC BLOOD PRESSURE: 62 MMHG | SYSTOLIC BLOOD PRESSURE: 124 MMHG | HEART RATE: 68 BPM | WEIGHT: 178.56 LBS | OXYGEN SATURATION: 97 %

## 2019-10-29 DIAGNOSIS — G47.33 OBSTRUCTIVE SLEEP APNEA: ICD-10-CM

## 2019-10-29 DIAGNOSIS — J45.30 MILD PERSISTENT ASTHMA WITHOUT COMPLICATION: Primary | ICD-10-CM

## 2019-10-29 PROCEDURE — 99999 PR PBB SHADOW E&M-EST. PATIENT-LVL III: CPT | Mod: PBBFAC,,, | Performed by: NURSE PRACTITIONER

## 2019-10-29 PROCEDURE — 99999 PR PBB SHADOW E&M-EST. PATIENT-LVL III: ICD-10-PCS | Mod: PBBFAC,,, | Performed by: NURSE PRACTITIONER

## 2019-10-29 PROCEDURE — 3008F BODY MASS INDEX DOCD: CPT | Mod: CPTII,S$GLB,, | Performed by: NURSE PRACTITIONER

## 2019-10-29 PROCEDURE — 99213 OFFICE O/P EST LOW 20 MIN: CPT | Mod: S$GLB,,, | Performed by: NURSE PRACTITIONER

## 2019-10-29 PROCEDURE — 3008F PR BODY MASS INDEX (BMI) DOCUMENTED: ICD-10-PCS | Mod: CPTII,S$GLB,, | Performed by: NURSE PRACTITIONER

## 2019-10-29 PROCEDURE — 99213 PR OFFICE/OUTPT VISIT, EST, LEVL III, 20-29 MIN: ICD-10-PCS | Mod: S$GLB,,, | Performed by: NURSE PRACTITIONER

## 2019-10-29 RX ORDER — AZITHROMYCIN 500 MG/1
500 TABLET, FILM COATED ORAL DAILY
Qty: 3 TABLET | Refills: 2 | Status: SHIPPED | OUTPATIENT
Start: 2019-10-29 | End: 2019-11-01

## 2019-10-29 RX ORDER — PREDNISONE 20 MG/1
TABLET ORAL
Qty: 18 TABLET | Refills: 1 | Status: SHIPPED | OUTPATIENT
Start: 2019-10-29 | End: 2021-02-25 | Stop reason: SDUPTHER

## 2019-10-29 NOTE — LETTER
October 29, 2019      Domo Gonzalez MD  [address]  [address]  [facility]  [place] [address]           [facility] [place] [facility]  [address]  [place] [address]  Phone: [phone]  Fax: [phone]          Patient: Eboni Reeves   MR Number: [id number]   YOB: 1955   Date of Visit: 10/29/2019       Dear Dr. Domo Gonzalez:    Thank you for referring Eboni Reeves to me for evaluation. Attached you will find relevant portions of my assessment and plan of care.    If you have questions, please do not hesitate to call me. I look forward to following Eboni Reeves along with you.    Sincerely,    Denisha Lucia, KATIA    Enclosure  CC:  No Recipients    If you would like to receive this communication electronically, please contact [email or web address] or [phone] to request more information on [facility] access.    For providers and/or their staff who would like to refer a patient to [facility], please contact us through our one-stop-shop provider referral line, [facility], at [phone].    If you feel you have received this communication in error or would no longer like to receive these types of communications, please e-mail [email or web address]

## 2019-10-29 NOTE — PATIENT INSTRUCTIONS
Get Flu and pneumonia vaccine    Wait 6 weeks and get TDAP (Pertussis)     Continue current Asthma medication regiment    Asthma Action plan    Azithromycin 500 mg 1 pill for three days for yellow or green mucous    Prednisone 20 mg pills, Take one pill a day for three days, repeat for shortness of breath or wheeze, or cough when albuterol nebulizer does not resolve problems    Albuterol Inhaler 1-2 puffs every 4 hours, for cough or shortness of breath    Use CPAP nightly, notify clinic when you receive the machine in your home to schedule your 32 day compliance report.
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
## 2019-11-04 ENCOUNTER — TELEPHONE (OUTPATIENT)
Dept: PULMONOLOGY | Facility: CLINIC | Age: 64
End: 2019-11-04

## 2019-11-04 NOTE — TELEPHONE ENCOUNTER
Gave patient appointment for Jan 6 2020 cpap compliance.    ----- Message from Sheldon Howard sent at 11/4/2019 12:51 PM CST -----  Contact: Pt  Pt called to say she received her Cpap machine today.    Pt can be reached at [phone].    Thanks

## 2019-11-08 ENCOUNTER — APPOINTMENT (OUTPATIENT)
Dept: LAB | Facility: HOSPITAL | Age: 64
End: 2019-11-08
Attending: NURSE PRACTITIONER
Payer: COMMERCIAL

## 2019-11-08 ENCOUNTER — OFFICE VISIT (OUTPATIENT)
Dept: UROLOGY | Facility: CLINIC | Age: 64
End: 2019-11-08
Payer: COMMERCIAL

## 2019-11-08 VITALS
WEIGHT: 178.81 LBS | HEIGHT: 66 IN | RESPIRATION RATE: 18 BRPM | TEMPERATURE: 98 F | DIASTOLIC BLOOD PRESSURE: 75 MMHG | HEART RATE: 69 BPM | BODY MASS INDEX: 28.74 KG/M2 | SYSTOLIC BLOOD PRESSURE: 146 MMHG

## 2019-11-08 DIAGNOSIS — R35.0 URINARY FREQUENCY: ICD-10-CM

## 2019-11-08 DIAGNOSIS — N30.10 IC (INTERSTITIAL CYSTITIS): Primary | ICD-10-CM

## 2019-11-08 DIAGNOSIS — R39.15 URINARY URGENCY: ICD-10-CM

## 2019-11-08 DIAGNOSIS — N32.81 OAB (OVERACTIVE BLADDER): ICD-10-CM

## 2019-11-08 LAB
BACTERIA #/AREA URNS HPF: ABNORMAL /HPF
BILIRUB SERPL-MCNC: ABNORMAL MG/DL
BILIRUB UR QL STRIP: NEGATIVE
BLOOD URINE, POC: ABNORMAL
CLARITY UR: CLEAR
COLOR UR: YELLOW
COLOR, POC UA: YELLOW
GLUCOSE UR QL STRIP: ABNORMAL
GLUCOSE UR QL STRIP: NEGATIVE
HGB UR QL STRIP: NEGATIVE
KETONES UR QL STRIP: ABNORMAL
KETONES UR QL STRIP: NEGATIVE
LEUKOCYTE ESTERASE UR QL STRIP: ABNORMAL
LEUKOCYTE ESTERASE URINE, POC: ABNORMAL
MICROSCOPIC COMMENT: ABNORMAL
NITRITE UR QL STRIP: NEGATIVE
NITRITE, POC UA: ABNORMAL
PH UR STRIP: 6 [PH] (ref 5–8)
PH, POC UA: 6
PROT UR QL STRIP: NEGATIVE
PROTEIN, POC: ABNORMAL
SP GR UR STRIP: 1.02 (ref 1–1.03)
SPECIFIC GRAVITY, POC UA: 1.02
URN SPEC COLLECT METH UR: ABNORMAL
UROBILINOGEN UR STRIP-ACNC: NEGATIVE EU/DL
UROBILINOGEN, POC UA: 0.2
WBC #/AREA URNS HPF: 75 /HPF (ref 0–5)

## 2019-11-08 PROCEDURE — 99214 PR OFFICE/OUTPT VISIT, EST, LEVL IV, 30-39 MIN: ICD-10-PCS | Mod: 25,S$GLB,, | Performed by: NURSE PRACTITIONER

## 2019-11-08 PROCEDURE — 87147 CULTURE TYPE IMMUNOLOGIC: CPT

## 2019-11-08 PROCEDURE — 3008F BODY MASS INDEX DOCD: CPT | Mod: CPTII,S$GLB,, | Performed by: NURSE PRACTITIONER

## 2019-11-08 PROCEDURE — 81000 URINALYSIS NONAUTO W/SCOPE: CPT

## 2019-11-08 PROCEDURE — 99999 PR PBB SHADOW E&M-EST. PATIENT-LVL IV: CPT | Mod: PBBFAC,,, | Performed by: NURSE PRACTITIONER

## 2019-11-08 PROCEDURE — 99214 OFFICE O/P EST MOD 30 MIN: CPT | Mod: 25,S$GLB,, | Performed by: NURSE PRACTITIONER

## 2019-11-08 PROCEDURE — 99999 PR PBB SHADOW E&M-EST. PATIENT-LVL IV: ICD-10-PCS | Mod: PBBFAC,,, | Performed by: NURSE PRACTITIONER

## 2019-11-08 PROCEDURE — 87086 URINE CULTURE/COLONY COUNT: CPT

## 2019-11-08 PROCEDURE — 87088 URINE BACTERIA CULTURE: CPT

## 2019-11-08 PROCEDURE — 81002 URINALYSIS NONAUTO W/O SCOPE: CPT | Mod: S$GLB,,, | Performed by: NURSE PRACTITIONER

## 2019-11-08 PROCEDURE — 3008F PR BODY MASS INDEX (BMI) DOCUMENTED: ICD-10-PCS | Mod: CPTII,S$GLB,, | Performed by: NURSE PRACTITIONER

## 2019-11-08 PROCEDURE — 81002 POCT URINE DIPSTICK WITHOUT MICROSCOPE: ICD-10-PCS | Mod: S$GLB,,, | Performed by: NURSE PRACTITIONER
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
## 2019-11-10 LAB — BACTERIA UR CULT: ABNORMAL

## 2019-11-13 ENCOUNTER — TELEPHONE (OUTPATIENT)
Dept: UROLOGY | Facility: CLINIC | Age: 64
End: 2019-11-13

## 2019-11-13 RX ORDER — CEPHALEXIN 500 MG/1
500 CAPSULE ORAL EVERY 12 HOURS
Qty: 20 CAPSULE | Refills: 0 | Status: SHIPPED | OUTPATIENT
Start: 2019-11-13 | End: 2019-11-23

## 2019-11-13 RX ORDER — FLUCONAZOLE 150 MG/1
150 TABLET ORAL DAILY
Qty: 3 TABLET | Refills: 0 | Status: SHIPPED | OUTPATIENT
Start: 2019-11-13 | End: 2019-11-16

## 2019-11-13 NOTE — TELEPHONE ENCOUNTER
Patient informed of antibiotic called in to treat infection and to take diflucan to prevent yeast infection. Patient voiced understanding.

## 2019-11-13 NOTE — TELEPHONE ENCOUNTER
Culture showed infection, please advise.     ----- Message from Elodia Farah sent at 11/13/2019  9:24 AM CST -----  Contact: Eboni skinner  Type:  Test Results    Who Called:  Eboni  Name of Test (Lab/Mammo/Etc):  labs  Date of Test:  friday  Ordering Provider:  Gonzales  Where the test was performed:  n/a  Best Call Back Number:  [phone]  Additional Information:  Pls call pt regarding her results

## 2019-11-13 NOTE — TELEPHONE ENCOUNTER
Please call pt and inform of following:    Keflex 500 mg BID x 7 days and Diflucan for prevention of yeast infection was sent into her pharmacy listed on file.    I apologize, I never received the results through flag/result notes.  Thanks.

## 2020-01-03 ENCOUNTER — TELEPHONE (OUTPATIENT)
Dept: PULMONOLOGY | Facility: CLINIC | Age: 65
End: 2020-01-03

## 2020-01-28 ENCOUNTER — OFFICE VISIT (OUTPATIENT)
Dept: PULMONOLOGY | Facility: CLINIC | Age: 65
End: 2020-01-28
Payer: COMMERCIAL

## 2020-01-28 VITALS
SYSTOLIC BLOOD PRESSURE: 124 MMHG | DIASTOLIC BLOOD PRESSURE: 74 MMHG | OXYGEN SATURATION: 95 % | WEIGHT: 179.25 LBS | BODY MASS INDEX: 28.93 KG/M2 | HEART RATE: 75 BPM

## 2020-01-28 DIAGNOSIS — G47.33 OBSTRUCTIVE SLEEP APNEA: ICD-10-CM

## 2020-01-28 DIAGNOSIS — J45.30 MILD PERSISTENT ASTHMA WITHOUT COMPLICATION: ICD-10-CM

## 2020-01-28 DIAGNOSIS — J01.00 ACUTE MAXILLARY SINUSITIS, RECURRENCE NOT SPECIFIED: Primary | ICD-10-CM

## 2020-01-28 DIAGNOSIS — R05.9 COUGH: ICD-10-CM

## 2020-01-28 DIAGNOSIS — R09.89 CHRONIC SINUS COMPLAINTS: ICD-10-CM

## 2020-01-28 PROCEDURE — 99999 PR PBB SHADOW E&M-EST. PATIENT-LVL III: CPT | Mod: PBBFAC,,, | Performed by: NURSE PRACTITIONER

## 2020-01-28 PROCEDURE — 3008F PR BODY MASS INDEX (BMI) DOCUMENTED: ICD-10-PCS | Mod: CPTII,S$GLB,, | Performed by: NURSE PRACTITIONER

## 2020-01-28 PROCEDURE — 3008F BODY MASS INDEX DOCD: CPT | Mod: CPTII,S$GLB,, | Performed by: NURSE PRACTITIONER

## 2020-01-28 PROCEDURE — 99999 PR PBB SHADOW E&M-EST. PATIENT-LVL III: ICD-10-PCS | Mod: PBBFAC,,, | Performed by: NURSE PRACTITIONER

## 2020-01-28 PROCEDURE — 99213 PR OFFICE/OUTPT VISIT, EST, LEVL III, 20-29 MIN: ICD-10-PCS | Mod: S$GLB,,, | Performed by: NURSE PRACTITIONER

## 2020-01-28 PROCEDURE — 99213 OFFICE O/P EST LOW 20 MIN: CPT | Mod: S$GLB,,, | Performed by: NURSE PRACTITIONER

## 2020-01-28 RX ORDER — FLUTICASONE PROPIONATE 50 MCG
1 SPRAY, SUSPENSION (ML) NASAL DAILY
Qty: 16 G | Refills: 11 | Status: SHIPPED | OUTPATIENT
Start: 2020-01-28 | End: 2021-03-25 | Stop reason: SDUPTHER

## 2020-01-28 RX ORDER — BENZONATATE 100 MG/1
100 CAPSULE ORAL 3 TIMES DAILY PRN
Qty: 30 CAPSULE | Refills: 0 | Status: SHIPPED | OUTPATIENT
Start: 2020-01-28 | End: 2020-02-07
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
## 2020-01-28 NOTE — PATIENT INSTRUCTIONS
Continue current Asthma medication regiment  Add flonase 1 spray each nostril daily    Use benzonate pills to help with cough    Continue to use CPAP nightly for obstructive sleep apnea.

## 2020-02-10 ENCOUNTER — OFFICE VISIT (OUTPATIENT)
Dept: ORTHOPEDICS | Facility: CLINIC | Age: 65
End: 2020-02-10
Payer: COMMERCIAL

## 2020-02-10 VITALS
DIASTOLIC BLOOD PRESSURE: 75 MMHG | HEART RATE: 65 BPM | SYSTOLIC BLOOD PRESSURE: 133 MMHG | WEIGHT: 178 LBS | HEIGHT: 66 IN | BODY MASS INDEX: 28.61 KG/M2

## 2020-02-10 DIAGNOSIS — M75.82 ROTATOR CUFF TENDONITIS, LEFT: ICD-10-CM

## 2020-02-10 DIAGNOSIS — S83.282A OTHER TEAR OF LATERAL MENISCUS OF LEFT KNEE AS CURRENT INJURY, INITIAL ENCOUNTER: ICD-10-CM

## 2020-02-10 DIAGNOSIS — M25.522 LEFT ELBOW PAIN: ICD-10-CM

## 2020-02-10 DIAGNOSIS — M25.512 ACUTE PAIN OF LEFT SHOULDER: ICD-10-CM

## 2020-02-10 DIAGNOSIS — M25.562 ACUTE PAIN OF LEFT KNEE: Primary | ICD-10-CM

## 2020-02-10 DIAGNOSIS — M77.12 LATERAL EPICONDYLITIS OF LEFT ELBOW: ICD-10-CM

## 2020-02-10 PROCEDURE — 20610 DRAIN/INJ JOINT/BURSA W/O US: CPT | Mod: LT,S$GLB,, | Performed by: ORTHOPAEDIC SURGERY

## 2020-02-10 PROCEDURE — 3008F BODY MASS INDEX DOCD: CPT | Mod: S$GLB,,, | Performed by: ORTHOPAEDIC SURGERY

## 2020-02-10 PROCEDURE — 99204 PR OFFICE/OUTPT VISIT, NEW, LEVL IV, 45-59 MIN: ICD-10-PCS | Mod: 25,S$GLB,, | Performed by: ORTHOPAEDIC SURGERY

## 2020-02-10 PROCEDURE — 20610 LARGE JOINT ASPIRATION/INJECTION: L KNEE: ICD-10-PCS | Mod: LT,S$GLB,, | Performed by: ORTHOPAEDIC SURGERY

## 2020-02-10 PROCEDURE — 99204 OFFICE O/P NEW MOD 45 MIN: CPT | Mod: 25,S$GLB,, | Performed by: ORTHOPAEDIC SURGERY

## 2020-02-10 PROCEDURE — 3008F PR BODY MASS INDEX (BMI) DOCUMENTED: ICD-10-PCS | Mod: S$GLB,,, | Performed by: ORTHOPAEDIC SURGERY

## 2020-02-10 RX ORDER — METHYLPREDNISOLONE ACETATE 40 MG/ML
40 INJECTION, SUSPENSION INTRA-ARTICULAR; INTRALESIONAL; INTRAMUSCULAR; SOFT TISSUE
Status: DISCONTINUED | OUTPATIENT
Start: 2020-02-10 | End: 2020-02-10 | Stop reason: HOSPADM

## 2020-02-10 RX ADMIN — METHYLPREDNISOLONE ACETATE 40 MG: 40 INJECTION, SUSPENSION INTRA-ARTICULAR; INTRALESIONAL; INTRAMUSCULAR; SOFT TISSUE at 09:02

## 2020-02-10 NOTE — PROCEDURES
Large Joint Aspiration/Injection: L knee  Performed by: Lavon Franz Jr., MD  Authorized by: Lavon Franz Jr., MD  Date/Time: 2/10/2020 9:15 AM    Consent Done?:  Yes (Verbal)  Indications:  pain  Timeout: Immediately prior to procedure a time out was called to verify the correct patient, procedure, equipment, support staff and site/side marked as required.  Prep:Patient was prepped and draped in the usual sterile fashion.  Procedure site marked: Yes     Anesthesia  Local anesthesia used  Anesthetic: lidocaine 1% without epinephrine    Details:   Needle size: 25 G    Ultrasonic Guidance for needle placement: No    Medications: 40 mg methylPREDNISolone acetate 40 mg/mL  Patient tolerance:  patient tolerated the procedure well with no immediate complications    Large Joint Aspiration/Injection: L subcoracoid bursa  Performed by: Lavon Franz Jr., MD  Authorized by: Lavon Franz Jr., MD  Date/Time: 2/10/2020 9:15 AM    Consent Done?:  Yes (Verbal)  Indications:   Timeout: Immediately prior to procedure a time out was called to verify the correct patient, procedure, equipment, support staff and site/side marked as required.  Prep:Patient was prepped and draped in the usual sterile fashion.  Procedure site marked: Yes     Anesthesia  Local anesthesia used  Anesthetic: lidocaine 1% without epinephrine    Details:   Needle size: 25 G    Ultrasonic Guidance for needle placement: No    Medications: 40 mg methylPREDNISolone acetate 40 mg/mL  Patient tolerance:  patient tolerated the procedure well with no immediate complications

## 2020-02-10 NOTE — PROGRESS NOTES
[facility]    Subjective:     Chief Complaint:   Chief Complaint   Patient presents with    Left Elbow - Pain     Left elbow pain x 4 weeks. States that she has pain that radiates to her shoulder and down her arm. States that her shoulder does bother her but only when she lifts something heavy.     Left Knee - Pain     Left knee pain x 4 weeks. States that the only time she had pain is when she kneels down on the knee, she has a sharp pain in the knee.        Past Medical History:   Diagnosis Date    Cystitis, interstitial     DVT (deep venous thrombosis)     Encounter for blood transfusion     Fx     RIGHT ANKLE    Stroke     TMJ (dislocation of temporomandibular joint)     Urinary tract infection        Past Surgical History:   Procedure Laterality Date    APPENDECTOMY      BLADDER SURGERY      HYSTERECTOMY      partial hysterectomy    MANDIBLE FRACTURE SURGERY      TONSILLECTOMY      WRIST SURGERY      right wrist, had plate put in       Current Outpatient Medications   Medication Sig    fluticasone furoate-vilanterol (BREO ELLIPTA) 200-25 mcg/dose DsDv diskus inhaler Inhale 1 puff into the lungs once daily. Controller    fluticasone propionate (FLONASE) 50 mcg/actuation nasal spray 1 spray (50 mcg total) by Each Nostril route once daily.    montelukast (SINGULAIR) 10 mg tablet Take 10 mg by mouth every evening.    MYRBETRIQ 50 mg Tb24     albuterol (PROVENTIL/VENTOLIN HFA) 90 mcg/actuation inhaler Inhale 2 puffs into the lungs every 6 (six) hours as needed for Wheezing. Rescue    predniSONE (DELTASONE) 20 MG tablet Take one pill a day for three days, repeat for shortness of breath (Patient not taking: Reported on 1/28/2020)     No current facility-administered medications for this visit.        Review of patient's allergies indicates:  No Known Allergies    Family History   Problem Relation Age of Onset    Urolithiasis Father     Prostate cancer Paternal Uncle     Kidney  cancer Neg Hx        Social History     Socioeconomic History    Marital status:      Spouse name: Not on file    Number of children: Not on file    Years of education: Not on file    Highest education level: Not on file   Occupational History    Not on file   Social Needs    Financial resource strain: Not on file    Food insecurity:     Worry: Not on file     Inability: Not on file    Transportation needs:     Medical: Not on file     Non-medical: Not on file   Tobacco Use    Smoking status: Never Smoker   Substance and Sexual Activity    Alcohol use: Yes    Drug use: No    Sexual activity: Not on file   Lifestyle    Physical activity:     Days per week: Not on file     Minutes per session: Not on file    Stress: Not on file   Relationships    Social connections:     Talks on phone: Not on file     Gets together: Not on file     Attends Zoroastrian service: Not on file     Active member of club or organization: Not on file     Attends meetings of clubs or organizations: Not on file     Relationship status: Not on file   Other Topics Concern    Not on file   Social History Narrative    Not on file       History of present illness:  She is having issues with left shoulder left elbow and left knee.  Often when some sharp pain left knee was she Lerma kneels.  Last month or so no prior treatment. No real effusion. She has at on daily basis.  She also has left shoulder pain with overhead activities and lifting items.  Again last month or so.  And she is having some pain into the left elbow at times.  Sometimes it is related to her shoulder sometimes it is not.      Review of Systems:    Constitution: Negative for chills, fever, and sweats.  Negative for unexplained weight loss.    HENT:  Negative for headaches and blurry vision.    Cardiovascular:Negative for chest pain or irregular heart beat. Negative for hypertension.    Respiratory:  Negative for cough and shortness of  breath.    Gastrointestinal: Negative for abdominal pain, heartburn, melena, nausea, and vomitting.    Genitourinary:  Negative bladder incontinence and dysuria.    Musculoskeletal:  See HPI for details.     Neurological: Negative for numbness.    Psychiatric/Behavioral: Negative for depression.  The patient is not nervous/anxious.      Endocrine: Negative for polyuria    Hematologic/Lymphatic: Negative for bleeding problem.  Does not bruise/bleed easily.    Skin: Negative for poor would healing and rash    Objective:      Physical Examination:    Vital Signs:    Vitals:    02/10/20 0931   BP: 133/75   Pulse: 65       Body mass index is 28.73 kg/m².    This a well-developed, well nourished patient in no acute distress.  They are alert and oriented and cooperative to examination.        So physical exam left shoulder shows that there clearly is a painful arc.  There is no pain with apprehension. She has good passive range of motion. She does have a bit of crepitation which is somewhat painful when we do that arc.  Right shoulder has no painful arc no crepitation.  Than left elbow does have some mild tenderness at the lateral epicondyle and some pain with .  The left knee has no effusion is very minimal atrophy. No effusion.  There is tenderness at the lateral joint line.  There is no obvious neuroma of the infrapatellar branch.  The patella is not tender with no crepitation or plica.  There is mild pain with lateral [place].  No instability left knee  Pertinent New Results:    XRAY Report / Interpretation:   AP lateral left elbows unremarkable.  No acute or chronic findings.  AP left shoulder is unremarkable with no acute or chronic findings and normal subdeltoid space. The left knee AP lateral sunrise shows minimal lateral attitude to the patella on sunrise with no major arthritic change.  The standing AP view shows no significant arthritic change in weight-bearing surface. No acute findings in left knee.   Signature    Assessment/Plan:      So my impression is I think she has some rotator cuff tendinitis left shoulder with without a small tear.  Our plan is steroid injection left shoulder.  1 cc Depo-Medrol 5 cc lidocaine anterior left shoulder.   I think she has tennis elbow early left elbow our plan is tennis elbow strap and ice.  Use strap for at least next month.  May end up with an injection somewhere.    The left knee has a small lateral meniscus tear. I do not think she has a lot of arthritis in the knee cap although it only bothers her kneeling so the may be some pain in the patella with kneeling so we did an anterolateral injection in the region of the infrapatellar branch  intra-articular left knee 1 cc Depo-Medrol 5 cc lidocaine.  Return if we have persistence or recurrence of left knee or left shoulder pain.  Work not an issue      This note was created using Dragon voice recognition software that occasionally misinterpreted phrases or words.

## 2020-02-27 ENCOUNTER — OFFICE VISIT (OUTPATIENT)
Dept: PODIATRY | Facility: CLINIC | Age: 65
End: 2020-02-27
Payer: COMMERCIAL

## 2020-02-27 VITALS
WEIGHT: 178 LBS | DIASTOLIC BLOOD PRESSURE: 84 MMHG | BODY MASS INDEX: 28.61 KG/M2 | SYSTOLIC BLOOD PRESSURE: 132 MMHG | HEART RATE: 68 BPM | HEIGHT: 66 IN

## 2020-02-27 DIAGNOSIS — M79.674 TOE PAIN, RIGHT: ICD-10-CM

## 2020-02-27 DIAGNOSIS — B35.1 OM (ONYCHOMYCOSIS): Primary | ICD-10-CM

## 2020-02-27 PROCEDURE — 3008F BODY MASS INDEX DOCD: CPT | Mod: S$GLB,,, | Performed by: PODIATRIST

## 2020-02-27 PROCEDURE — 99213 OFFICE O/P EST LOW 20 MIN: CPT | Mod: S$GLB,,, | Performed by: PODIATRIST

## 2020-02-27 PROCEDURE — 99213 PR OFFICE/OUTPT VISIT, EST, LEVL III, 20-29 MIN: ICD-10-PCS | Mod: S$GLB,,, | Performed by: PODIATRIST

## 2020-02-27 PROCEDURE — 3008F PR BODY MASS INDEX (BMI) DOCUMENTED: ICD-10-PCS | Mod: S$GLB,,, | Performed by: PODIATRIST

## 2020-02-27 RX ORDER — FLUCONAZOLE 200 MG/1
200 TABLET ORAL WEEKLY
Qty: 28 TABLET | Refills: 0 | Status: SHIPPED | OUTPATIENT
Start: 2020-02-27 | End: 2020-09-04

## 2020-02-27 NOTE — PROGRESS NOTES
[address] [facility] [address]  [place] [address]  Phone: [phone]   [phone]    Patient's PCP:Domo Gonzalez MD  Referring Provider: No ref. provider found    Subjective:      Chief Complaint:: Toe Pain (right big toe pain)    HPI  Eboni Reeves is a 64 y.o. female who presents with a complaint of  Pain right first toenail lasting for a couple of months. Onset of the symptoms was none.  Current symptoms include pain.  Aggravating factors are touch and shoes. Symptoms have remained the same.Treatment to date have included none. Patients rates pain 5/10 on pain scale.        Vitals:    02/27/20 1525   BP: 132/84   Pulse: 68     Shoe Size: 9    Past Surgical History:   Procedure Laterality Date    APPENDECTOMY      BLADDER SURGERY      HYSTERECTOMY      partial hysterectomy    MANDIBLE FRACTURE SURGERY      TONSILLECTOMY      WRIST SURGERY      right wrist, had plate put in     Past Medical History:   Diagnosis Date    Cystitis, interstitial     DVT (deep venous thrombosis)     Encounter for blood transfusion     Fx     RIGHT ANKLE    Stroke     TMJ (dislocation of temporomandibular joint)     Urinary tract infection      Family History   Problem Relation Age of Onset    Urolithiasis Father     Prostate cancer Paternal Uncle     Kidney cancer Neg Hx         Social History:   Marital Status:   Alcohol History:  reports that she drinks alcohol.  Tobacco History:  reports that she has never smoked. She does not have any smokeless tobacco history on file.  Drug History:  reports that she does not use drugs.    Review of patient's allergies indicates:  No Known Allergies    Current Outpatient Medications   Medication Sig Dispense Refill    albuterol (PROVENTIL/VENTOLIN HFA) 90 mcg/actuation inhaler Inhale 2 puffs into the lungs every 6 (six) hours as needed for Wheezing. Rescue      fluticasone furoate-vilanterol (BREO ELLIPTA) 200-25 mcg/dose DsDv diskus inhaler Inhale 1 puff into  the lungs once daily. Controller 1 each 11    fluticasone propionate (FLONASE) 50 mcg/actuation nasal spray 1 spray (50 mcg total) by Each Nostril route once daily. 16 g 11    montelukast (SINGULAIR) 10 mg tablet Take 10 mg by mouth every evening.      MYRBETRIQ 50 mg Tb24   12    predniSONE (DELTASONE) 20 MG tablet Take one pill a day for three days, repeat for shortness of breath 18 tablet 1    fluconazole (DIFLUCAN) 200 MG Tab Take 1 tablet (200 mg total) by mouth once a week. for 28 doses 28 tablet 0     No current facility-administered medications for this visit.        Review of Systems      Objective:        Physical Exam:   Foot Exam    General  General Appearance: appears stated age and healthy   Orientation: alert and oriented to person, place, and time   Affect: appropriate   Gait: unimpaired       Right Foot/Ankle     Inspection and Palpation  Ecchymosis: none  Tenderness: (With pressure on toenail right 1st toe)  Swelling: none   Arch: pes planus  Right foot bunion: Well-healed bunion surgery with good range of motion of 1st MPJ.  Skin Exam: skin intact; (The right 1st toenail slightly incurvated with increased thickness and yellow discoloration consistent with onychomycosis.  No ulceration no purulent drainage no fluctuance)    Neurovascular  Dorsalis pedis: 2+  Posterior tibial: 2+  Saphenous nerve sensation: normal  Tibial nerve sensation: normal  Superficial peroneal nerve sensation: normal  Deep peroneal nerve sensation: normal  Sural nerve sensation: normal          Physical Exam   Cardiovascular:   Pulses:       Dorsalis pedis pulses are 2+ on the right side.        Posterior tibial pulses are 2+ on the right side.   Musculoskeletal:        Feet:        Imaging:            Assessment:       1. OM (onychomycosis) - Left Foot    2. Toe pain, right      Plan:   OM (onychomycosis) - Left Foot  -     fluconazole (DIFLUCAN) 200 MG Tab; Take 1 tablet (200 mg total) by mouth once a week. for 28  doses  Dispense: 28 tablet; Refill: 0    Toe pain, right  -     fluconazole (DIFLUCAN) 200 MG Tab; Take 1 tablet (200 mg total) by mouth once a week. for 28 doses  Dispense: 28 tablet; Refill: 0    Fungal infection of toenails explained. Treatment options including no treatment, periodic debridement, topical medications, oral medications, and removal of the nail were discussed, as well as success rates and risks of recurrence. We agreed on oral medication, will order the necessary lab work Diflucan 200 mg 1 pill weekly for 28 weeks return as needed.  Follow up if symptoms worsen or fail to improve.    Procedures - None    Counseling:     I provided patient education verbally regarding:   Patient diagnosis, treatment options, as well as alternatives, risks, and benefits.     This note was created using Dragon voice recognition software that occasionally misinterpreted phrases or words.

## 2020-03-11 ENCOUNTER — APPOINTMENT (OUTPATIENT)
Dept: LAB | Facility: HOSPITAL | Age: 65
End: 2020-03-11
Attending: NURSE PRACTITIONER
Payer: COMMERCIAL

## 2020-03-11 ENCOUNTER — OFFICE VISIT (OUTPATIENT)
Dept: UROLOGY | Facility: CLINIC | Age: 65
End: 2020-03-11
Payer: COMMERCIAL

## 2020-03-11 VITALS
HEART RATE: 67 BPM | BODY MASS INDEX: 28.34 KG/M2 | DIASTOLIC BLOOD PRESSURE: 80 MMHG | RESPIRATION RATE: 18 BRPM | WEIGHT: 176.38 LBS | SYSTOLIC BLOOD PRESSURE: 151 MMHG | HEIGHT: 66 IN | TEMPERATURE: 98 F

## 2020-03-11 DIAGNOSIS — N30.80 CYSTITIS CYSTICA: ICD-10-CM

## 2020-03-11 DIAGNOSIS — N39.0 URINARY TRACT INFECTION WITHOUT HEMATURIA, SITE UNSPECIFIED: Primary | ICD-10-CM

## 2020-03-11 LAB
BACTERIA #/AREA URNS HPF: NORMAL /HPF
BILIRUB SERPL-MCNC: NEGATIVE MG/DL
BILIRUB UR QL STRIP: NEGATIVE
BLOOD URINE, POC: NEGATIVE
CLARITY UR: CLEAR
COLOR UR: YELLOW
COLOR, POC UA: ABNORMAL
GLUCOSE UR QL STRIP: NEGATIVE
GLUCOSE UR QL STRIP: NEGATIVE
HGB UR QL STRIP: NEGATIVE
KETONES UR QL STRIP: NEGATIVE
KETONES UR QL STRIP: NEGATIVE
LEUKOCYTE ESTERASE UR QL STRIP: ABNORMAL
LEUKOCYTE ESTERASE URINE, POC: ABNORMAL
MICROSCOPIC COMMENT: NORMAL
NITRITE UR QL STRIP: NEGATIVE
NITRITE, POC UA: NEGATIVE
PH UR STRIP: 7 [PH] (ref 5–8)
PH, POC UA: 7.5
PROT UR QL STRIP: NEGATIVE
PROTEIN, POC: NEGATIVE
RBC #/AREA URNS HPF: 2 /HPF (ref 0–4)
SP GR UR STRIP: 1.01 (ref 1–1.03)
SPECIFIC GRAVITY, POC UA: 1.02
URN SPEC COLLECT METH UR: ABNORMAL
UROBILINOGEN UR STRIP-ACNC: NEGATIVE EU/DL
UROBILINOGEN, POC UA: 0.2
WBC #/AREA URNS HPF: 1 /HPF (ref 0–5)

## 2020-03-11 PROCEDURE — 99214 OFFICE O/P EST MOD 30 MIN: CPT | Mod: 25,S$GLB,, | Performed by: NURSE PRACTITIONER

## 2020-03-11 PROCEDURE — 81002 URINALYSIS NONAUTO W/O SCOPE: CPT | Mod: S$GLB,,, | Performed by: NURSE PRACTITIONER

## 2020-03-11 PROCEDURE — 99214 PR OFFICE/OUTPT VISIT, EST, LEVL IV, 30-39 MIN: ICD-10-PCS | Mod: 25,S$GLB,, | Performed by: NURSE PRACTITIONER

## 2020-03-11 PROCEDURE — 81002 POCT URINE DIPSTICK WITHOUT MICROSCOPE: ICD-10-PCS | Mod: S$GLB,,, | Performed by: NURSE PRACTITIONER

## 2020-03-11 PROCEDURE — 87086 URINE CULTURE/COLONY COUNT: CPT

## 2020-03-11 PROCEDURE — 81000 URINALYSIS NONAUTO W/SCOPE: CPT

## 2020-03-11 PROCEDURE — 3008F BODY MASS INDEX DOCD: CPT | Mod: CPTII,S$GLB,, | Performed by: NURSE PRACTITIONER

## 2020-03-11 PROCEDURE — 99999 PR PBB SHADOW E&M-EST. PATIENT-LVL IV: ICD-10-PCS | Mod: PBBFAC,,, | Performed by: NURSE PRACTITIONER

## 2020-03-11 PROCEDURE — 3008F PR BODY MASS INDEX (BMI) DOCUMENTED: ICD-10-PCS | Mod: CPTII,S$GLB,, | Performed by: NURSE PRACTITIONER

## 2020-03-11 PROCEDURE — 99999 PR PBB SHADOW E&M-EST. PATIENT-LVL IV: CPT | Mod: PBBFAC,,, | Performed by: NURSE PRACTITIONER

## 2020-03-11 RX ORDER — CEPHALEXIN 500 MG/1
500 CAPSULE ORAL EVERY 12 HOURS
Qty: 28 CAPSULE | Refills: 0 | Status: SHIPPED | OUTPATIENT
Start: 2020-03-11 | End: 2020-03-25

## 2020-03-11 RX ORDER — MIRABEGRON 50 MG/1
50 TABLET, FILM COATED, EXTENDED RELEASE ORAL DAILY
Qty: 90 TABLET | Refills: 3 | Status: SHIPPED | OUTPATIENT
Start: 2020-03-11 | End: 2021-03-28

## 2020-03-11 NOTE — PROGRESS NOTES
[facility] Note  Staff: VIRGEN ByrdC    PCP: Dr. Domo Gonzalez    Chief Complaint: UTI Symptoms    Subjective:        HPI: Eboni Reeves is a 64 y.o. female presents today with c/o dysuria, urinary urgency and frequency complaints x one week.  Pt believes she has a UTI at this time.  Pt was last seen by me on 11/08/2019 for urinary incontinence issues.    PT  HX:  Pt has history of interstitial cystitis cystica and was previously established with Dr. Noguera.  Last ov with MD was 02/15/2017.  Pt doing well today with no complaints voiced.     Ms. Reeves is a 64-year-old female with the above diagnosis, being managed with Myrbetriq 50 mg p.o. daily and also she takes Macrobid when she becomes symptomatic.  She refers that the Macrobid is taking twice a day for a week and usually the dysuria disappears unresolved.  The Pyridium is taken only for a couple of days when needed.    The rest of   the medical and surgical history have not changed and is well documented in the   medical record and that was reviewed during this visit.     Last procedure done by Chuckie was on 4/26/2016:  Cystoscopy with bladder hydrodistention and dilation of Urethra with no complications.     UA today shows:   Moderate leukocytes, all other normal findings.  Pt denies gross hematuria at this time.    REVIEW OF SYSTEMS:  A comprehensive 10 system review was performed and is negative except as noted above in HPI     PMHx:  Past Medical History:   Diagnosis Date    Cystitis, interstitial     DVT (deep venous thrombosis)     Encounter for blood transfusion     Fx     RIGHT ANKLE    Stroke     TMJ (dislocation of temporomandibular joint)     Urinary tract infection      PSHx:  Past Surgical History:   Procedure Laterality Date    APPENDECTOMY      BLADDER SURGERY      HYSTERECTOMY      partial hysterectomy    MANDIBLE FRACTURE SURGERY      TONSILLECTOMY      WRIST SURGERY      right wrist, had plate  put in     Allergies:  Patient has no known allergies.    Medications: reviewed   Objective:     Vitals:    03/11/20 1123   BP: (!) 151/80   Pulse: 67   Resp: 18   Temp: 97.9 °F (36.6 °C)     Physical Exam   Vitals reviewed.  Constitutional: She is oriented to person, place, and time. She appears well-developed and well-nourished.   HENT:   Head: Normocephalic and atraumatic.   Eyes: Conjunctivae and EOM are normal. Pupils are equal, round, and reactive to light.   Neck: Normal range of motion. Neck supple.   Cardiovascular: Normal rate, regular rhythm, normal heart sounds and intact distal pulses.    Pulmonary/Chest: Effort normal and breath sounds normal.   Abdominal: Soft. Bowel sounds are normal.   Musculoskeletal: Normal range of motion.   Neurological: She is alert and oriented to person, place, and time. She has normal reflexes.   Skin: Skin is warm and dry.     Psychiatric: She has a normal mood and affect. Her behavior is normal. Judgment and thought content normal.     LABS REVIEW:  UA today:   Color: Cloudy, Yellow  Spec. Grav.  1.020  PH  7.5  Moderate leukocytes  Negative for nitrates, protein, glucose, ketones, urobili, bili, and blood.    Assessment:       1. Urinary tract infection without hematuria, site unspecified    2. Cystitis cystica          Plan:     1. UA, UA Micro, and Urine culture to be performed.  2. In meantime, Keflex 500 mg BID x 14 days prescribed to pt today for possible UTI.  3.  Pt was instructed to continue the Myrbetriq 50 mg daily as previously prescribed.    F/u--We will contact this pt after we receive her results.    [place]: None    ANN Norris

## 2020-03-11 NOTE — PATIENT INSTRUCTIONS
Urinary Tract Infections in Women    Urinary tract infections (UTIs) are most often caused by bacteria (germs). These bacteria enter the urinary tract. The bacteria may come from outside the body. Or they may travel from the skin outside the rectum or vagina into the urethra. Female anatomy makes it easy for bacteria from the bowel to enter a womans urinary tract, which is the most common source of UTI. This means women develop UTIs more often than men. Pain in or around the urinary tract is a common UTI symptom. But the only way to know for sure if you have a UTI for the healthcare provider to test your urine. The two tests that may be done are the urinalysis and urine culture.  Types of UTIs  · Cystitis: A bladder infection (cystitis) is the most common UTI in women. You may have urgent or frequent urination. You may also have pain, burning when you urinate, and bloody urine.  · Urethritis: This is an inflamed urethra, which is the tube that carries urine from the bladder to outside the body. You may have lower stomach or back pain. You may also have urgent or frequent urination.  · Pyelonephritis: This is a kidney infection. If not treated, it can be serious and damage your kidneys. In severe cases, you may be hospitalized. You may have a fever and lower back pain.  Medicines to treat a UTI  Most UTIs are treated with antibiotics. These kill the bacteria. The length of time you need to take them depends on the type of infection. It may be as short as 3 days. If you have repeated UTIs, a low-dose antibiotic may be needed for several months. Take antibiotics exactly as directed. Dont stop taking them until all of the medicine is gone. If you stop taking the antibiotic too soon, the infection may not go away, and you may develop a resistance to the antibiotic. This can make it much harder to treat.  Lifestyle changes to treat and prevent UTIs  The lifestyle changes below will help get rid of your UTI. They may  also help prevent future UTIs.  · Drink plenty of fluids. This includes water, juice, or other caffeine-free drinks. Fluids help flush bacteria out of your body.  · Empty your bladder. Always empty your bladder when you feel the urge to urinate. And always urinate before going to sleep. Urine that stays in your bladder can lead to infection. Try to urinate before and after sex as well.  · Practice good personal hygiene. Wipe yourself from front to back after using the toilet. This helps keep bacteria from getting into the urethra.  · Use condoms during sex. These help prevent UTIs caused by sexually transmitted bacteria. Also, avoid using spermicides during sex. These can increase the risk of UTIs. Choose other forms of birth control instead. For women who tend to get UTIs after sex, a low-dose of a preventive antibiotic may be used. Be sure to discuss this option with your healthcare provider.  · Follow up with your healthcare provider as directed. He or she may test to make sure the infection has cleared. If needed, more treatment may be started.  Date Last Reviewed: 1/1/2017  © 1942-6303 [employer]. [address] [place] [address]. All rights reserved. This information is not intended as a substitute for professional medical care. Always follow your healthcare professional's instructions.        Bladder Infection, Female (Adult)    Urine is normally doesn't have any bacteria in it. But bacteria can get into the urinary tract from the skin around the rectum. Or they can travel in the blood from elsewhere in the body. Once they are in your urinary tract, they can cause infection in the urethra (urethritis), the bladder (cystitis), or the kidneys (pyelonephritis).  The most common place for an infection is in the bladder. This is called a bladder infection. This is one of the most common infections in women. Most bladder infections are easily treated. They are not serious unless the  "infection spreads to the kidney.  The phrases "bladder infection," "UTI," and "cystitis" are often used to describe the same thing. But they are not always the same. Cystitis is an inflammation of the bladder. The most common cause of cystitis is an infection.  Symptoms  The infection causes inflammation in the urethra and bladder. This causes many of the symptoms. The most common symptoms of a bladder infection are:  · Pain or burning when urinating  · Having to urinate more often than usual  · Urgent need to urinate  · Only a small amount of urine comes out  · Blood in urine  · Abdominal discomfort. This is usually in the lower abdomen above the pubic bone.  · Cloudy urine  · Strong- or bad-smelling urine  · Unable to urinate (urinary retention)  · Unable to hold urine in (urinary incontinence)  · Fever  · Loss of appetite  · Confusion (in older adults)  Causes  Bladder infections are not contagious. You can't get one from someone else, from a toilet seat, or from sharing a bath.  The most common cause of bladder infections is bacteria from the bowels. The bacteria get onto the skin around the opening of the urethra. From there, they can get into the urine and travel up to the bladder, causing inflammation and infection. This usually happens because of:  · Wiping improperly after urinating. Always wipe from front to back.  · Bowel incontinence  · Pregnancy  · Procedures such as having a catheter inserted  · Older age  · Not emptying your bladder. This can allow bacteria a chance to grow in your urine.  · Dehydration  · Constipation  · Sex  · Use of a diaphragm for birth control   Treatment  Bladder infections are diagnosed by a urine test. They are treated with antibiotics and usually clear up quickly without complications. Treatment helps prevent a more serious kidney infection.  Medicines  Medicines can help in the treatment of a bladder infection:  · Take antibiotics until they are used up, even if you feel " better. It is important to finish them to make sure the infection has cleared.  · You can use acetaminophen or ibuprofen for pain, fever, or discomfort, unless another medicine was prescribed. If you have chronic liver or kidney disease, talk with your healthcare provider before using these medicines. Also talk with your provider if you've ever had a stomach ulcer or gastrointestinal bleeding, or are taking blood-thinner medicines.  · If you are given phenazopydridine to reduce burning with urination, it will cause your urine to become a bright orange color. This can stain clothing.  Care and prevention  These self-care steps can help prevent future infections:  · Drink plenty of fluids to prevent dehydration and flush out your bladder. Do this unless you must restrict fluids for other health reasons, or your doctor told you not to.  · Proper cleaning after going to the bathroom is important. Wipe from front to back after using the toilet to prevent the spread of bacteria.  · Urinate more often. Don't try to hold urine in for a long time.  · Wear loose-fitting clothes and cotton underwear. Avoid tight-fitting pants.  · Improve your diet and prevent constipation. Eat more fresh fruit and vegetables, and fiber, and less junk and fatty foods.  · Avoid sex until your symptoms are gone.  · Avoid caffeine, alcohol, and spicy foods. These can irritate your bladder.  · Urinate right after intercourse to flush out your bladder.  · If you use birth control pills and have frequent bladder infections, discuss it with your doctor.  Follow-up care  Call your healthcare provider if all symptoms are not gone after 3 days of treatment. This is especially important if you have repeat infections.  If a culture was done, you will be told if your treatment needs to be changed. If directed, you can call to find out the results.  If X-rays were done, you will be told if the results will affect your treatment.  Call 911  Call 911 if any of  the following occur:  · Trouble breathing  · Hard to wake up or confusion  · Fainting or loss of consciousness  · Rapid heart rate  When to seek medical advice  Call your healthcare provider right away if any of these occur:  · Fever of 100.4ºF (38.0ºC) or higher, or as directed by your healthcare provider  · Symptoms are not better by the third day of treatment  · Back or belly (abdominal) pain that gets worse  · Repeated vomiting, or unable to keep medicine down  · Weakness or dizziness  · Vaginal discharge  · Pain, redness, or swelling in the outer vaginal area (labia)  Date Last Reviewed: 10/1/2016  © 5798-8544 [employer]. [address] [place] [address], [place] [address]. All rights reserved. This information is not intended as a substitute for professional medical care. Always follow your healthcare professional's instructions.

## 2020-03-13 LAB
BACTERIA UR CULT: NORMAL
BACTERIA UR CULT: NORMAL

## 2020-03-16 ENCOUNTER — CLINICAL SUPPORT (OUTPATIENT)
Dept: UROLOGY | Facility: CLINIC | Age: 65
End: 2020-03-16
Payer: COMMERCIAL

## 2020-03-16 ENCOUNTER — TELEPHONE (OUTPATIENT)
Dept: UROLOGY | Facility: CLINIC | Age: 65
End: 2020-03-16

## 2020-03-16 ENCOUNTER — APPOINTMENT (OUTPATIENT)
Dept: LAB | Facility: HOSPITAL | Age: 65
End: 2020-03-16
Attending: NURSE PRACTITIONER
Payer: COMMERCIAL

## 2020-03-16 DIAGNOSIS — R39.89 BLADDER PAIN: Primary | ICD-10-CM

## 2020-03-16 LAB
BILIRUB UR QL STRIP: NEGATIVE
CAOX CRY URNS QL MICRO: NORMAL
CLARITY UR: CLEAR
COLOR UR: YELLOW
GLUCOSE UR QL STRIP: NEGATIVE
HGB UR QL STRIP: NEGATIVE
KETONES UR QL STRIP: NEGATIVE
LEUKOCYTE ESTERASE UR QL STRIP: NEGATIVE
MICROSCOPIC COMMENT: NORMAL
NITRITE UR QL STRIP: NEGATIVE
PH UR STRIP: 6 [PH] (ref 5–8)
PROT UR QL STRIP: NEGATIVE
SP GR UR STRIP: 1.02 (ref 1–1.03)
SQUAMOUS #/AREA URNS HPF: 1 /HPF
URN SPEC COLLECT METH UR: NORMAL
UROBILINOGEN UR STRIP-ACNC: NEGATIVE EU/DL
WBC #/AREA URNS HPF: 2 /HPF (ref 0–5)

## 2020-03-16 PROCEDURE — 81000 URINALYSIS NONAUTO W/SCOPE: CPT

## 2020-03-16 PROCEDURE — 87086 URINE CULTURE/COLONY COUNT: CPT

## 2020-03-16 NOTE — TELEPHONE ENCOUNTER
----- Message from Rich Zeng sent at 3/16/2020 11:21 AM CDT -----  Contact: self   Patient want to speak with a nurse regarding update on how she is feeling [place] told patient to call please call back at [phone]    Case number [id number]

## 2020-03-16 NOTE — TELEPHONE ENCOUNTER
Patient states that her discomfort has eased slightly but she's still feeling it, however the urinary frequency has increased a lot. Patient was scheduled to come in today for a catheterized urine collect as recommended.

## 2020-03-16 NOTE — PROGRESS NOTES
Patient arrived to office today to give a catheterized urine sample.  Patient prepped and draped in sterile manner, 10fr in/out catheter inserted and clear yellow urine collected into a labeled sterile cup to be sent to the lab for testing.

## 2020-03-17 LAB — BACTERIA UR CULT: NO GROWTH

## 2020-03-18 ENCOUNTER — TELEPHONE (OUTPATIENT)
Dept: UROLOGY | Facility: CLINIC | Age: 65
End: 2020-03-18

## 2020-03-18 NOTE — TELEPHONE ENCOUNTER
----- Message from Jessica Mcrae sent at 3/18/2020 10:34 AM CDT -----  Contact: Pt  Type: Returning Call    Who Called: Pt  Who Left the patient a message: unsure  Does the pt  know what this is regarding: results  Best Call back number: [phone]  Additional information: Pt requesting a call back.       Please advise. Thank you

## 2020-06-30 DIAGNOSIS — G47.33 OBSTRUCTIVE SLEEP APNEA: Primary | ICD-10-CM

## 2020-06-30 RX ORDER — MONTELUKAST SODIUM 10 MG/1
10 TABLET ORAL NIGHTLY
Qty: 90 TABLET | Refills: 3 | Status: SHIPPED | OUTPATIENT
Start: 2020-06-30 | End: 2021-02-25 | Stop reason: SDUPTHER

## 2020-07-01 ENCOUNTER — OFFICE VISIT (OUTPATIENT)
Dept: PRIMARY CARE CLINIC | Facility: CLINIC | Age: 65
End: 2020-07-01
Payer: MEDICARE

## 2020-07-01 VITALS
SYSTOLIC BLOOD PRESSURE: 141 MMHG | TEMPERATURE: 98 F | DIASTOLIC BLOOD PRESSURE: 65 MMHG | OXYGEN SATURATION: 96 % | HEART RATE: 72 BPM | RESPIRATION RATE: 18 BRPM

## 2020-07-01 DIAGNOSIS — U07.1 COVID-19: Primary | ICD-10-CM

## 2020-07-01 PROCEDURE — 1101F PR PT FALLS ASSESS DOC 0-1 FALLS W/OUT INJ PAST YR: ICD-10-PCS | Mod: CPTII,S$GLB,, | Performed by: EMERGENCY MEDICINE

## 2020-07-01 PROCEDURE — 1101F PT FALLS ASSESS-DOCD LE1/YR: CPT | Mod: CPTII,S$GLB,, | Performed by: EMERGENCY MEDICINE

## 2020-07-01 PROCEDURE — 99203 OFFICE O/P NEW LOW 30 MIN: CPT | Mod: S$GLB,,, | Performed by: EMERGENCY MEDICINE

## 2020-07-01 PROCEDURE — U0003 INFECTIOUS AGENT DETECTION BY NUCLEIC ACID (DNA OR RNA); SEVERE ACUTE RESPIRATORY SYNDROME CORONAVIRUS 2 (SARS-COV-2) (CORONAVIRUS DISEASE [COVID-19]), AMPLIFIED PROBE TECHNIQUE, MAKING USE OF HIGH THROUGHPUT TECHNOLOGIES AS DESCRIBED BY CMS-2020-01-R: HCPCS

## 2020-07-01 PROCEDURE — 99203 PR OFFICE/OUTPT VISIT, NEW, LEVL III, 30-44 MIN: ICD-10-PCS | Mod: S$GLB,,, | Performed by: EMERGENCY MEDICINE

## 2020-07-01 NOTE — PROGRESS NOTES
Subjective:        Time seen by provider: 1:45 PM on 07/01/2020    Eboni Reeves is a 65 y.o. female with prior stroke who presents for an evaluation of possible COVID-19. She complains of a scratchy throat, which is attributed to her seasonal allergies. The patient reports exposure to multiple family members. She denies fever, SOB, diarrhea, or any other symptoms at this time. No pertinent PSHx.     Review of Systems   Constitutional: Negative for activity change, appetite change, fatigue and fever.   HENT: Positive for sore throat (chronic, unchanged). Negative for congestion and rhinorrhea.    Respiratory: Negative for cough, chest tightness, shortness of breath and wheezing.    Cardiovascular: Negative for chest pain and palpitations.   Gastrointestinal: Negative for diarrhea, nausea and vomiting.   Musculoskeletal: Negative for arthralgias and myalgias.   Skin: Negative for rash.   Neurological: Negative for weakness, light-headedness, numbness and headaches.       Objective:      Physical Exam  Vitals signs and nursing note reviewed.   Constitutional:       General: She is not in acute distress.     Appearance: She is well-developed. She is not diaphoretic.   HENT:      Head: Normocephalic and atraumatic.      Nose: Nose normal.   Eyes:      Conjunctiva/sclera: Conjunctivae normal.   Neck:      Musculoskeletal: Normal range of motion.   Cardiovascular:      Rate and Rhythm: Normal rate and regular rhythm.      Heart sounds: Normal heart sounds. No murmur.   Pulmonary:      Effort: No respiratory distress.      Breath sounds: Normal breath sounds. No wheezing.   Musculoskeletal: Normal range of motion.   Skin:     General: Skin is warm and dry.   Neurological:      Mental Status: She is alert and oriented to person, place, and time.         Assessment and Plan:      Diagnoses and all orders for this visit:    COVID-19  -     COVID-19 Routine Screening  - Discharge home and await results.   - Return to clinic or  ED for new or worsening symptoms.   - Follow-up with PCP as needed.     Scribe Attestation:   I, Missy Ramey, am scribing for, and in the presence of, Riya Archer PA-C. I performed the above scribed service and the documentation accurately describes the services I performed. I attest to the accuracy of the note.    I, Riya Archer PA-C, personally performed the services described in this documentation. All medical record entries made by the scribe were at my direction and in my presence.  I have reviewed the chart and agree that the record reflects my personal performance and is accurate and complete. Riya Archer PA-C.  4:46 PM 07/01/2020

## 2020-07-02 LAB — SARS-COV-2 RNA RESP QL NAA+PROBE: NOT DETECTED

## 2020-08-13 DIAGNOSIS — J45.30 MILD PERSISTENT ASTHMA WITHOUT COMPLICATION: ICD-10-CM

## 2020-08-13 RX ORDER — FLUTICASONE FUROATE AND VILANTEROL TRIFENATATE 200; 25 UG/1; UG/1
1 POWDER RESPIRATORY (INHALATION) DAILY
Qty: 1 EACH | Refills: 11 | Status: SHIPPED | OUTPATIENT
Start: 2020-08-13 | End: 2021-02-25 | Stop reason: ALTCHOICE

## 2020-08-18 ENCOUNTER — TELEPHONE (OUTPATIENT)
Dept: PODIATRY | Facility: CLINIC | Age: 65
End: 2020-08-18

## 2021-01-04 ENCOUNTER — TELEPHONE (OUTPATIENT)
Dept: FAMILY MEDICINE | Facility: CLINIC | Age: 66
End: 2021-01-04

## 2021-01-04 DIAGNOSIS — U07.1 COVID-19: Primary | ICD-10-CM

## 2021-01-05 ENCOUNTER — INFUSION (OUTPATIENT)
Dept: INFECTIOUS DISEASES | Facility: HOSPITAL | Age: 66
End: 2021-01-05
Attending: PHYSICIAN ASSISTANT
Payer: MEDICARE

## 2021-01-05 VITALS
OXYGEN SATURATION: 97 % | DIASTOLIC BLOOD PRESSURE: 70 MMHG | RESPIRATION RATE: 18 BRPM | TEMPERATURE: 98 F | WEIGHT: 176 LBS | BODY MASS INDEX: 28.28 KG/M2 | SYSTOLIC BLOOD PRESSURE: 153 MMHG | HEIGHT: 66 IN | HEART RATE: 70 BPM

## 2021-01-05 DIAGNOSIS — U07.1 COVID-19: ICD-10-CM

## 2021-01-05 PROCEDURE — M0243 CASIRIVI AND IMDEVI INFUSION: HCPCS | Performed by: PHYSICIAN ASSISTANT

## 2021-01-05 PROCEDURE — 25000003 PHARM REV CODE 250: Performed by: PHYSICIAN ASSISTANT

## 2021-01-05 PROCEDURE — 63600175 PHARM REV CODE 636 W HCPCS: Performed by: PHYSICIAN ASSISTANT

## 2021-01-05 RX ORDER — ONDANSETRON 4 MG/1
4 TABLET, ORALLY DISINTEGRATING ORAL ONCE AS NEEDED
Status: DISCONTINUED | OUTPATIENT
Start: 2021-01-05 | End: 2021-05-03

## 2021-01-05 RX ORDER — ACETAMINOPHEN 325 MG/1
650 TABLET ORAL ONCE AS NEEDED
Status: DISCONTINUED | OUTPATIENT
Start: 2021-01-05 | End: 2023-10-23

## 2021-01-05 RX ORDER — DIPHENHYDRAMINE HYDROCHLORIDE 50 MG/ML
25 INJECTION INTRAMUSCULAR; INTRAVENOUS ONCE AS NEEDED
Status: DISCONTINUED | OUTPATIENT
Start: 2021-01-05 | End: 2021-05-03

## 2021-01-05 RX ORDER — ALBUTEROL SULFATE 90 UG/1
2 AEROSOL, METERED RESPIRATORY (INHALATION)
Status: DISCONTINUED | OUTPATIENT
Start: 2021-01-05 | End: 2023-10-23

## 2021-01-05 RX ORDER — SODIUM CHLORIDE 0.9 % (FLUSH) 0.9 %
10 SYRINGE (ML) INJECTION
Status: DISCONTINUED | OUTPATIENT
Start: 2021-01-05 | End: 2021-05-03

## 2021-01-05 RX ORDER — EPINEPHRINE 0.1 MG/ML
0.3 INJECTION INTRAVENOUS
Status: DISCONTINUED | OUTPATIENT
Start: 2021-01-05 | End: 2021-05-03

## 2021-01-05 RX ADMIN — SODIUM CHLORIDE: 0.9 INJECTION, SOLUTION INTRAVENOUS at 08:01

## 2021-01-05 RX ADMIN — CASIRIVIMAB: 1332 INJECTION, SOLUTION, CONCENTRATE INTRAVENOUS at 09:01

## 2021-02-25 ENCOUNTER — OFFICE VISIT (OUTPATIENT)
Dept: PULMONOLOGY | Facility: CLINIC | Age: 66
End: 2021-02-25
Payer: MEDICARE

## 2021-02-25 VITALS
WEIGHT: 178.88 LBS | SYSTOLIC BLOOD PRESSURE: 138 MMHG | BODY MASS INDEX: 28.75 KG/M2 | HEART RATE: 70 BPM | OXYGEN SATURATION: 96 % | DIASTOLIC BLOOD PRESSURE: 77 MMHG | HEIGHT: 66 IN

## 2021-02-25 DIAGNOSIS — J45.30 MILD PERSISTENT ASTHMA WITHOUT COMPLICATION: Primary | ICD-10-CM

## 2021-02-25 DIAGNOSIS — G47.33 OBSTRUCTIVE SLEEP APNEA: ICD-10-CM

## 2021-02-25 DIAGNOSIS — R09.89 CHRONIC SINUS COMPLAINTS: ICD-10-CM

## 2021-02-25 PROCEDURE — 3288F PR FALLS RISK ASSESSMENT DOCUMENTED: ICD-10-PCS | Mod: CPTII,S$GLB,, | Performed by: NURSE PRACTITIONER

## 2021-02-25 PROCEDURE — 99214 PR OFFICE/OUTPT VISIT, EST, LEVL IV, 30-39 MIN: ICD-10-PCS | Mod: S$GLB,,, | Performed by: NURSE PRACTITIONER

## 2021-02-25 PROCEDURE — 99999 PR PBB SHADOW E&M-EST. PATIENT-LVL IV: ICD-10-PCS | Mod: PBBFAC,,, | Performed by: NURSE PRACTITIONER

## 2021-02-25 PROCEDURE — 3288F FALL RISK ASSESSMENT DOCD: CPT | Mod: CPTII,S$GLB,, | Performed by: NURSE PRACTITIONER

## 2021-02-25 PROCEDURE — 1126F AMNT PAIN NOTED NONE PRSNT: CPT | Mod: S$GLB,,, | Performed by: NURSE PRACTITIONER

## 2021-02-25 PROCEDURE — 99214 OFFICE O/P EST MOD 30 MIN: CPT | Mod: S$GLB,,, | Performed by: NURSE PRACTITIONER

## 2021-02-25 PROCEDURE — 99999 PR PBB SHADOW E&M-EST. PATIENT-LVL IV: CPT | Mod: PBBFAC,,, | Performed by: NURSE PRACTITIONER

## 2021-02-25 PROCEDURE — 3008F PR BODY MASS INDEX (BMI) DOCUMENTED: ICD-10-PCS | Mod: CPTII,S$GLB,, | Performed by: NURSE PRACTITIONER

## 2021-02-25 PROCEDURE — 1126F PR PAIN SEVERITY QUANTIFIED, NO PAIN PRESENT: ICD-10-PCS | Mod: S$GLB,,, | Performed by: NURSE PRACTITIONER

## 2021-02-25 PROCEDURE — 3008F BODY MASS INDEX DOCD: CPT | Mod: CPTII,S$GLB,, | Performed by: NURSE PRACTITIONER

## 2021-02-25 PROCEDURE — 1101F PR PT FALLS ASSESS DOC 0-1 FALLS W/OUT INJ PAST YR: ICD-10-PCS | Mod: CPTII,S$GLB,, | Performed by: NURSE PRACTITIONER

## 2021-02-25 PROCEDURE — 1101F PT FALLS ASSESS-DOCD LE1/YR: CPT | Mod: CPTII,S$GLB,, | Performed by: NURSE PRACTITIONER

## 2021-02-25 RX ORDER — MONTELUKAST SODIUM 10 MG/1
10 TABLET ORAL NIGHTLY
Qty: 90 TABLET | Refills: 3 | Status: SHIPPED | OUTPATIENT
Start: 2021-02-25 | End: 2021-07-13 | Stop reason: SDUPTHER

## 2021-02-25 RX ORDER — ALBUTEROL SULFATE 90 UG/1
2 AEROSOL, METERED RESPIRATORY (INHALATION) EVERY 4 HOURS PRN
Qty: 18 G | Refills: 11 | Status: SHIPPED | OUTPATIENT
Start: 2021-02-25 | End: 2021-05-12 | Stop reason: CLARIF

## 2021-02-25 RX ORDER — DEXTROMETHORPHAN HYDROBROMIDE, GUAIFENESIN, AND PHENYLEPHRINE HYDROCHLORIDE 17.5; 385; 1 MG/1; MG/1; MG/1
TABLET ORAL
COMMUNITY
Start: 2021-01-02 | End: 2021-04-01

## 2021-02-25 RX ORDER — UREA 40 G/100G
LOTION TOPICAL
COMMUNITY
End: 2021-04-01

## 2021-02-25 RX ORDER — PREDNISONE 20 MG/1
TABLET ORAL
Qty: 18 TABLET | Refills: 1 | Status: SHIPPED | OUTPATIENT
Start: 2021-02-25 | End: 2021-09-28 | Stop reason: SDUPTHER

## 2021-03-01 ENCOUNTER — PATIENT MESSAGE (OUTPATIENT)
Dept: PULMONOLOGY | Facility: CLINIC | Age: 66
End: 2021-03-01

## 2021-03-02 ENCOUNTER — TELEPHONE (OUTPATIENT)
Dept: PULMONOLOGY | Facility: CLINIC | Age: 66
End: 2021-03-02

## 2021-03-12 DIAGNOSIS — M85.88 MASS OF HARD PALATE: Primary | ICD-10-CM

## 2021-03-15 DIAGNOSIS — Z12.31 ENCOUNTER FOR SCREENING MAMMOGRAM FOR MALIGNANT NEOPLASM OF BREAST: Primary | ICD-10-CM

## 2021-03-19 ENCOUNTER — TELEPHONE (OUTPATIENT)
Dept: CARDIOLOGY | Facility: CLINIC | Age: 66
End: 2021-03-19

## 2021-03-19 DIAGNOSIS — E78.5 HYPERLIPIDEMIA, UNSPECIFIED HYPERLIPIDEMIA TYPE: Primary | ICD-10-CM

## 2021-03-19 DIAGNOSIS — I51.7 LVH (LEFT VENTRICULAR HYPERTROPHY): ICD-10-CM

## 2021-03-19 DIAGNOSIS — I49.9 VENTRICULAR ARRHYTHMIA: ICD-10-CM

## 2021-03-25 DIAGNOSIS — R09.89 CHRONIC SINUS COMPLAINTS: ICD-10-CM

## 2021-03-25 LAB
ALBUMIN SERPL-MCNC: 4.5 G/DL (ref 3.8–4.8)
ALBUMIN/GLOB SERPL: 2.4 {RATIO} (ref 1.2–2.2)
ALP SERPL-CCNC: 59 IU/L (ref 39–117)
ALT SERPL-CCNC: 19 IU/L (ref 0–32)
AST SERPL-CCNC: 21 IU/L (ref 0–40)
BASOPHILS # BLD AUTO: 0.1 X10E3/UL (ref 0–0.2)
BASOPHILS NFR BLD AUTO: 2 %
BILIRUB SERPL-MCNC: 0.3 MG/DL (ref 0–1.2)
BUN SERPL-MCNC: 18 MG/DL (ref 8–27)
BUN/CREAT SERPL: 21 (ref 12–28)
CALCIUM SERPL-MCNC: 10.3 MG/DL (ref 8.7–10.3)
CHLORIDE SERPL-SCNC: 104 MMOL/L (ref 96–106)
CHOLEST SERPL-MCNC: 233 MG/DL (ref 100–199)
CO2 SERPL-SCNC: 24 MMOL/L (ref 20–29)
CREAT SERPL-MCNC: 0.87 MG/DL (ref 0.57–1)
EOSINOPHIL # BLD AUTO: 0.2 X10E3/UL (ref 0–0.4)
EOSINOPHIL NFR BLD AUTO: 3 %
ERYTHROCYTE [DISTWIDTH] IN BLOOD BY AUTOMATED COUNT: 13.5 % (ref 11.7–15.4)
GLOBULIN SER CALC-MCNC: 1.9 G/DL (ref 1.5–4.5)
GLUCOSE SERPL-MCNC: 87 MG/DL (ref 65–99)
HCT VFR BLD AUTO: 40.7 % (ref 34–46.6)
HDLC SERPL-MCNC: 88 MG/DL
HGB BLD-MCNC: 12.8 G/DL (ref 11.1–15.9)
IMM GRANULOCYTES # BLD AUTO: 0 X10E3/UL (ref 0–0.1)
IMM GRANULOCYTES NFR BLD AUTO: 0 %
LDLC SERPL CALC-MCNC: 132 MG/DL (ref 0–99)
LYMPHOCYTES # BLD AUTO: 1.6 X10E3/UL (ref 0.7–3.1)
LYMPHOCYTES NFR BLD AUTO: 28 %
MCH RBC QN AUTO: 29.4 PG (ref 26.6–33)
MCHC RBC AUTO-ENTMCNC: 31.4 G/DL (ref 31.5–35.7)
MCV RBC AUTO: 93 FL (ref 79–97)
MONOCYTES # BLD AUTO: 0.7 X10E3/UL (ref 0.1–0.9)
MONOCYTES NFR BLD AUTO: 12 %
NEUTROPHILS # BLD AUTO: 3.1 X10E3/UL (ref 1.4–7)
NEUTROPHILS NFR BLD AUTO: 55 %
PLATELET # BLD AUTO: 362 X10E3/UL (ref 150–450)
POTASSIUM SERPL-SCNC: 4.5 MMOL/L (ref 3.5–5.2)
PROT SERPL-MCNC: 6.4 G/DL (ref 6–8.5)
RBC # BLD AUTO: 4.36 X10E6/UL (ref 3.77–5.28)
SODIUM SERPL-SCNC: 141 MMOL/L (ref 134–144)
TRIGL SERPL-MCNC: 74 MG/DL (ref 0–149)
TSH SERPL DL<=0.005 MIU/L-ACNC: 2.93 UIU/ML (ref 0.45–4.5)
VLDLC SERPL CALC-MCNC: 13 MG/DL (ref 5–40)
WBC # BLD AUTO: 5.6 X10E3/UL (ref 3.4–10.8)

## 2021-03-25 RX ORDER — FLUTICASONE PROPIONATE 50 MCG
1 SPRAY, SUSPENSION (ML) NASAL DAILY
Qty: 16 G | Refills: 11 | Status: SHIPPED | OUTPATIENT
Start: 2021-03-25 | End: 2022-06-14 | Stop reason: SDUPTHER

## 2021-04-01 ENCOUNTER — OFFICE VISIT (OUTPATIENT)
Dept: CARDIOLOGY | Facility: CLINIC | Age: 66
End: 2021-04-01
Payer: MEDICARE

## 2021-04-01 VITALS
DIASTOLIC BLOOD PRESSURE: 70 MMHG | BODY MASS INDEX: 28.28 KG/M2 | WEIGHT: 176 LBS | HEART RATE: 70 BPM | OXYGEN SATURATION: 98 % | HEIGHT: 66 IN | SYSTOLIC BLOOD PRESSURE: 120 MMHG

## 2021-04-01 DIAGNOSIS — R93.1 ELEVATED CORONARY ARTERY CALCIUM SCORE: Chronic | ICD-10-CM

## 2021-04-01 DIAGNOSIS — Z00.00 ROUTINE CHECK-UP: Primary | ICD-10-CM

## 2021-04-01 DIAGNOSIS — J45.30 MILD PERSISTENT ASTHMA WITHOUT COMPLICATION: ICD-10-CM

## 2021-04-01 DIAGNOSIS — E78.00 HYPERCHOLESTEROLEMIA: ICD-10-CM

## 2021-04-01 PROCEDURE — 3288F PR FALLS RISK ASSESSMENT DOCUMENTED: ICD-10-PCS | Mod: CPTII,S$GLB,, | Performed by: INTERNAL MEDICINE

## 2021-04-01 PROCEDURE — 99213 OFFICE O/P EST LOW 20 MIN: CPT | Mod: S$GLB,,, | Performed by: INTERNAL MEDICINE

## 2021-04-01 PROCEDURE — 99213 PR OFFICE/OUTPT VISIT, EST, LEVL III, 20-29 MIN: ICD-10-PCS | Mod: S$GLB,,, | Performed by: INTERNAL MEDICINE

## 2021-04-01 PROCEDURE — 93000 ELECTROCARDIOGRAM COMPLETE: CPT | Mod: S$GLB,,, | Performed by: SPECIALIST

## 2021-04-01 PROCEDURE — 1101F PT FALLS ASSESS-DOCD LE1/YR: CPT | Mod: CPTII,S$GLB,, | Performed by: INTERNAL MEDICINE

## 2021-04-01 PROCEDURE — 3288F FALL RISK ASSESSMENT DOCD: CPT | Mod: CPTII,S$GLB,, | Performed by: INTERNAL MEDICINE

## 2021-04-01 PROCEDURE — 3008F PR BODY MASS INDEX (BMI) DOCUMENTED: ICD-10-PCS | Mod: CPTII,S$GLB,, | Performed by: INTERNAL MEDICINE

## 2021-04-01 PROCEDURE — 93000 EKG 12-LEAD: ICD-10-PCS | Mod: S$GLB,,, | Performed by: SPECIALIST

## 2021-04-01 PROCEDURE — 1101F PR PT FALLS ASSESS DOC 0-1 FALLS W/OUT INJ PAST YR: ICD-10-PCS | Mod: CPTII,S$GLB,, | Performed by: INTERNAL MEDICINE

## 2021-04-01 PROCEDURE — 3008F BODY MASS INDEX DOCD: CPT | Mod: CPTII,S$GLB,, | Performed by: INTERNAL MEDICINE

## 2021-04-01 RX ORDER — ROSUVASTATIN CALCIUM 5 MG/1
5 TABLET, COATED ORAL DAILY
Qty: 30 TABLET | Refills: 11 | Status: SHIPPED | OUTPATIENT
Start: 2021-04-01 | End: 2022-05-24 | Stop reason: SDUPTHER

## 2021-04-19 ENCOUNTER — OFFICE VISIT (OUTPATIENT)
Dept: UROLOGY | Facility: CLINIC | Age: 66
End: 2021-04-19
Payer: MEDICARE

## 2021-04-19 ENCOUNTER — HOSPITAL ENCOUNTER (OUTPATIENT)
Dept: RADIOLOGY | Facility: HOSPITAL | Age: 66
Discharge: HOME | End: 2021-04-19
Attending: NURSE PRACTITIONER
Payer: MEDICARE

## 2021-04-19 VITALS
DIASTOLIC BLOOD PRESSURE: 84 MMHG | WEIGHT: 175.94 LBS | HEIGHT: 66 IN | HEART RATE: 69 BPM | RESPIRATION RATE: 18 BRPM | SYSTOLIC BLOOD PRESSURE: 138 MMHG | BODY MASS INDEX: 28.27 KG/M2

## 2021-04-19 DIAGNOSIS — R31.9 HEMATURIA OF UNKNOWN CAUSE: ICD-10-CM

## 2021-04-19 DIAGNOSIS — R31.0 GROSS HEMATURIA: Primary | ICD-10-CM

## 2021-04-19 DIAGNOSIS — N39.0 URINARY TRACT INFECTION WITHOUT HEMATURIA, SITE UNSPECIFIED: ICD-10-CM

## 2021-04-19 DIAGNOSIS — R39.89 BLADDER PAIN: ICD-10-CM

## 2021-04-19 LAB
BILIRUB SERPL-MCNC: ABNORMAL MG/DL
BLOOD URINE, POC: ABNORMAL
CLARITY, POC UA: CLEAR
COLOR, POC UA: YELLOW
GLUCOSE UR QL STRIP: ABNORMAL
KETONES UR QL STRIP: ABNORMAL
LEUKOCYTE ESTERASE URINE, POC: ABNORMAL
NITRITE, POC UA: ABNORMAL
PH, POC UA: 6.5
PROTEIN, POC: 30
SPECIFIC GRAVITY, POC UA: 1.02
UROBILINOGEN, POC UA: 0.2

## 2021-04-19 PROCEDURE — 25500020 PHARM REV CODE 255: Performed by: NURSE PRACTITIONER

## 2021-04-19 PROCEDURE — 74178 CT ABD&PLV WO CNTR FLWD CNTR: CPT | Mod: TC

## 2021-04-19 PROCEDURE — 1101F PR PT FALLS ASSESS DOC 0-1 FALLS W/OUT INJ PAST YR: ICD-10-PCS | Mod: CPTII,S$GLB,, | Performed by: NURSE PRACTITIONER

## 2021-04-19 PROCEDURE — 3008F PR BODY MASS INDEX (BMI) DOCUMENTED: ICD-10-PCS | Mod: CPTII,S$GLB,, | Performed by: NURSE PRACTITIONER

## 2021-04-19 PROCEDURE — 87186 SC STD MICRODIL/AGAR DIL: CPT | Performed by: NURSE PRACTITIONER

## 2021-04-19 PROCEDURE — 1101F PT FALLS ASSESS-DOCD LE1/YR: CPT | Mod: CPTII,S$GLB,, | Performed by: NURSE PRACTITIONER

## 2021-04-19 PROCEDURE — 99999 PR PBB SHADOW E&M-EST. PATIENT-LVL V: ICD-10-PCS | Mod: PBBFAC,,, | Performed by: NURSE PRACTITIONER

## 2021-04-19 PROCEDURE — 88112 PR  CYTOPATH, CELL ENHANCE TECH: ICD-10-PCS | Mod: 26,,, | Performed by: PATHOLOGY

## 2021-04-19 PROCEDURE — 3008F BODY MASS INDEX DOCD: CPT | Mod: CPTII,S$GLB,, | Performed by: NURSE PRACTITIONER

## 2021-04-19 PROCEDURE — 87086 URINE CULTURE/COLONY COUNT: CPT | Performed by: NURSE PRACTITIONER

## 2021-04-19 PROCEDURE — 1126F AMNT PAIN NOTED NONE PRSNT: CPT | Mod: S$GLB,,, | Performed by: NURSE PRACTITIONER

## 2021-04-19 PROCEDURE — 87077 CULTURE AEROBIC IDENTIFY: CPT | Performed by: NURSE PRACTITIONER

## 2021-04-19 PROCEDURE — 99999 PR PBB SHADOW E&M-EST. PATIENT-LVL V: CPT | Mod: PBBFAC,,, | Performed by: NURSE PRACTITIONER

## 2021-04-19 PROCEDURE — 1126F PR PAIN SEVERITY QUANTIFIED, NO PAIN PRESENT: ICD-10-PCS | Mod: S$GLB,,, | Performed by: NURSE PRACTITIONER

## 2021-04-19 PROCEDURE — 99214 PR OFFICE/OUTPT VISIT, EST, LEVL IV, 30-39 MIN: ICD-10-PCS | Mod: 25,S$GLB,, | Performed by: NURSE PRACTITIONER

## 2021-04-19 PROCEDURE — 87088 URINE BACTERIA CULTURE: CPT | Performed by: NURSE PRACTITIONER

## 2021-04-19 PROCEDURE — 3288F FALL RISK ASSESSMENT DOCD: CPT | Mod: CPTII,S$GLB,, | Performed by: NURSE PRACTITIONER

## 2021-04-19 PROCEDURE — 88112 CYTOPATH CELL ENHANCE TECH: CPT | Mod: 26,,, | Performed by: PATHOLOGY

## 2021-04-19 PROCEDURE — 3288F PR FALLS RISK ASSESSMENT DOCUMENTED: ICD-10-PCS | Mod: CPTII,S$GLB,, | Performed by: NURSE PRACTITIONER

## 2021-04-19 PROCEDURE — 81002 POCT URINE DIPSTICK WITHOUT MICROSCOPE: ICD-10-PCS | Mod: S$GLB,,, | Performed by: NURSE PRACTITIONER

## 2021-04-19 PROCEDURE — 81002 URINALYSIS NONAUTO W/O SCOPE: CPT | Mod: S$GLB,,, | Performed by: NURSE PRACTITIONER

## 2021-04-19 PROCEDURE — 88112 CYTOPATH CELL ENHANCE TECH: CPT | Performed by: PATHOLOGY

## 2021-04-19 PROCEDURE — 99214 OFFICE O/P EST MOD 30 MIN: CPT | Mod: 25,S$GLB,, | Performed by: NURSE PRACTITIONER

## 2021-04-19 RX ORDER — PHENAZOPYRIDINE HYDROCHLORIDE 200 MG/1
200 TABLET, FILM COATED ORAL 3 TIMES DAILY PRN
Qty: 30 TABLET | Refills: 1 | Status: SHIPPED | OUTPATIENT
Start: 2021-04-19 | End: 2021-04-29

## 2021-04-19 RX ORDER — CIPROFLOXACIN 500 MG/1
500 TABLET ORAL 2 TIMES DAILY
Qty: 28 TABLET | Refills: 0 | Status: SHIPPED | OUTPATIENT
Start: 2021-04-19 | End: 2021-04-22 | Stop reason: ALTCHOICE

## 2021-04-19 RX ORDER — FLUCONAZOLE 150 MG/1
150 TABLET ORAL DAILY
Qty: 3 TABLET | Refills: 0 | Status: SHIPPED | OUTPATIENT
Start: 2021-04-19 | End: 2021-04-22

## 2021-04-19 RX ADMIN — IOHEXOL 100 ML: 350 INJECTION, SOLUTION INTRAVENOUS at 12:04

## 2021-04-20 ENCOUNTER — TELEPHONE (OUTPATIENT)
Dept: UROLOGY | Facility: CLINIC | Age: 66
End: 2021-04-20

## 2021-04-20 ENCOUNTER — PATIENT MESSAGE (OUTPATIENT)
Dept: UROLOGY | Facility: CLINIC | Age: 66
End: 2021-04-20

## 2021-04-20 DIAGNOSIS — N20.1 RIGHT URETERAL STONE: Primary | ICD-10-CM

## 2021-04-20 RX ORDER — KETOROLAC TROMETHAMINE 10 MG/1
10 TABLET, FILM COATED ORAL EVERY 8 HOURS PRN
Qty: 10 TABLET | Refills: 0 | Status: SHIPPED | OUTPATIENT
Start: 2021-04-20 | End: 2021-04-25

## 2021-04-20 RX ORDER — HYDROCODONE BITARTRATE AND ACETAMINOPHEN 5; 325 MG/1; MG/1
1 TABLET ORAL EVERY 6 HOURS PRN
Qty: 10 TABLET | Refills: 0 | Status: SHIPPED | OUTPATIENT
Start: 2021-04-20 | End: 2021-04-30 | Stop reason: ALTCHOICE

## 2021-04-20 RX ORDER — TAMSULOSIN HYDROCHLORIDE 0.4 MG/1
0.4 CAPSULE ORAL
Qty: 30 CAPSULE | Refills: 0 | Status: SHIPPED | OUTPATIENT
Start: 2021-04-20 | End: 2022-04-28

## 2021-04-21 LAB
FINAL PATHOLOGIC DIAGNOSIS: NORMAL
Lab: NORMAL

## 2021-04-22 LAB — BACTERIA UR CULT: ABNORMAL

## 2021-04-22 RX ORDER — DOXYCYCLINE HYCLATE 100 MG
100 TABLET ORAL 2 TIMES DAILY
Qty: 28 TABLET | Refills: 0 | Status: SHIPPED | OUTPATIENT
Start: 2021-04-22 | End: 2021-05-06

## 2021-04-26 PROBLEM — E78.00 HYPERCHOLESTEROLEMIA: Chronic | Status: ACTIVE | Noted: 2021-04-26

## 2021-04-26 PROBLEM — R93.1 ELEVATED CORONARY ARTERY CALCIUM SCORE: Chronic | Status: ACTIVE | Noted: 2021-04-26

## 2021-04-30 ENCOUNTER — PATIENT MESSAGE (OUTPATIENT)
Dept: UROLOGY | Facility: CLINIC | Age: 66
End: 2021-04-30

## 2021-04-30 ENCOUNTER — TELEPHONE (OUTPATIENT)
Dept: UROLOGY | Facility: CLINIC | Age: 66
End: 2021-04-30

## 2021-04-30 RX ORDER — OXYCODONE AND ACETAMINOPHEN 5; 325 MG/1; MG/1
1 TABLET ORAL
Qty: 30 TABLET | Refills: 0 | Status: SHIPPED | OUTPATIENT
Start: 2021-04-30 | End: 2021-05-03

## 2021-05-03 ENCOUNTER — HOSPITAL ENCOUNTER (OUTPATIENT)
Dept: PREADMISSION TESTING | Facility: HOSPITAL | Age: 66
Discharge: HOME | End: 2021-05-03
Attending: UROLOGY
Payer: MEDICARE

## 2021-05-03 ENCOUNTER — LAB VISIT (OUTPATIENT)
Dept: PRIMARY CARE CLINIC | Facility: CLINIC | Age: 66
End: 2021-05-03
Payer: MEDICARE

## 2021-05-03 ENCOUNTER — OFFICE VISIT (OUTPATIENT)
Dept: UROLOGY | Facility: CLINIC | Age: 66
End: 2021-05-03
Payer: MEDICARE

## 2021-05-03 ENCOUNTER — HOSPITAL ENCOUNTER (OUTPATIENT)
Dept: RADIOLOGY | Facility: HOSPITAL | Age: 66
Discharge: HOME | End: 2021-05-03
Attending: UROLOGY
Payer: MEDICARE

## 2021-05-03 VITALS
DIASTOLIC BLOOD PRESSURE: 80 MMHG | SYSTOLIC BLOOD PRESSURE: 137 MMHG | HEIGHT: 66 IN | HEART RATE: 64 BPM | WEIGHT: 173.94 LBS | BODY MASS INDEX: 27.95 KG/M2

## 2021-05-03 DIAGNOSIS — N20.1 RIGHT URETERAL STONE: ICD-10-CM

## 2021-05-03 DIAGNOSIS — Z01.818 PREOP EXAMINATION: ICD-10-CM

## 2021-05-03 DIAGNOSIS — N20.1 RIGHT URETERAL STONE: Primary | ICD-10-CM

## 2021-05-03 LAB
BACTERIA #/AREA URNS HPF: ABNORMAL /HPF
BILIRUB SERPL-MCNC: ABNORMAL MG/DL
BILIRUB UR QL STRIP: NEGATIVE
BLOOD URINE, POC: ABNORMAL
CLARITY UR: ABNORMAL
CLARITY, POC UA: ABNORMAL
COLOR UR: YELLOW
COLOR, POC UA: YELLOW
GLUCOSE UR QL STRIP: ABNORMAL
GLUCOSE UR QL STRIP: NEGATIVE
HGB UR QL STRIP: ABNORMAL
KETONES UR QL STRIP: ABNORMAL
KETONES UR QL STRIP: NEGATIVE
LEUKOCYTE ESTERASE UR QL STRIP: NEGATIVE
LEUKOCYTE ESTERASE URINE, POC: ABNORMAL
MICROSCOPIC COMMENT: ABNORMAL
NITRITE UR QL STRIP: NEGATIVE
NITRITE, POC UA: ABNORMAL
PH UR STRIP: 6 [PH] (ref 5–8)
PH, POC UA: 5.5
PROT UR QL STRIP: NEGATIVE
PROTEIN, POC: 30
RBC #/AREA URNS HPF: >100 /HPF (ref 0–4)
SP GR UR STRIP: 1.02 (ref 1–1.03)
SPECIFIC GRAVITY, POC UA: 1.02
URN SPEC COLLECT METH UR: ABNORMAL
UROBILINOGEN UR STRIP-ACNC: NEGATIVE EU/DL
UROBILINOGEN, POC UA: 0.2

## 2021-05-03 PROCEDURE — 81000 URINALYSIS NONAUTO W/SCOPE: CPT | Performed by: UROLOGY

## 2021-05-03 PROCEDURE — 81002 URINALYSIS NONAUTO W/O SCOPE: CPT | Mod: S$GLB,,, | Performed by: UROLOGY

## 2021-05-03 PROCEDURE — 74018 XR ABDOMEN AP 1 VIEW: ICD-10-PCS | Mod: 26,,, | Performed by: RADIOLOGY

## 2021-05-03 PROCEDURE — 3008F PR BODY MASS INDEX (BMI) DOCUMENTED: ICD-10-PCS | Mod: CPTII,S$GLB,, | Performed by: UROLOGY

## 2021-05-03 PROCEDURE — 99900104 DSU ONLY-NO CHARGE-EA ADD'L HR (STAT)

## 2021-05-03 PROCEDURE — 51701 INSERT BLADDER CATHETER: CPT | Mod: S$GLB,,, | Performed by: UROLOGY

## 2021-05-03 PROCEDURE — 3008F BODY MASS INDEX DOCD: CPT | Mod: CPTII,S$GLB,, | Performed by: UROLOGY

## 2021-05-03 PROCEDURE — 99214 PR OFFICE/OUTPT VISIT, EST, LEVL IV, 30-39 MIN: ICD-10-PCS | Mod: 25,S$GLB,, | Performed by: UROLOGY

## 2021-05-03 PROCEDURE — U0005 INFEC AGEN DETEC AMPLI PROBE: HCPCS | Performed by: UROLOGY

## 2021-05-03 PROCEDURE — 99999 PR PBB SHADOW E&M-EST. PATIENT-LVL V: CPT | Mod: PBBFAC,,, | Performed by: UROLOGY

## 2021-05-03 PROCEDURE — 74018 RADEX ABDOMEN 1 VIEW: CPT | Mod: TC,FY

## 2021-05-03 PROCEDURE — 99214 OFFICE O/P EST MOD 30 MIN: CPT | Mod: 25,S$GLB,, | Performed by: UROLOGY

## 2021-05-03 PROCEDURE — 99999 PR PBB SHADOW E&M-EST. PATIENT-LVL V: ICD-10-PCS | Mod: PBBFAC,,, | Performed by: UROLOGY

## 2021-05-03 PROCEDURE — 74018 RADEX ABDOMEN 1 VIEW: CPT | Mod: 26,,, | Performed by: RADIOLOGY

## 2021-05-03 PROCEDURE — 51701 PR INSERTION OF NON-INDWELLING BLADDER CATHETERIZATION FOR RESIDUAL UR: ICD-10-PCS | Mod: S$GLB,,, | Performed by: UROLOGY

## 2021-05-03 PROCEDURE — 81002 POCT URINE DIPSTICK WITHOUT MICROSCOPE: ICD-10-PCS | Mod: S$GLB,,, | Performed by: UROLOGY

## 2021-05-03 PROCEDURE — 99900103 DSU ONLY-NO CHARGE-INITIAL HR (STAT)

## 2021-05-03 PROCEDURE — U0003 INFECTIOUS AGENT DETECTION BY NUCLEIC ACID (DNA OR RNA); SEVERE ACUTE RESPIRATORY SYNDROME CORONAVIRUS 2 (SARS-COV-2) (CORONAVIRUS DISEASE [COVID-19]), AMPLIFIED PROBE TECHNIQUE, MAKING USE OF HIGH THROUGHPUT TECHNOLOGIES AS DESCRIBED BY CMS-2020-01-R: HCPCS | Performed by: UROLOGY

## 2021-05-03 RX ORDER — CIPROFLOXACIN 2 MG/ML
400 INJECTION, SOLUTION INTRAVENOUS
Status: CANCELLED | OUTPATIENT
Start: 2021-05-03

## 2021-05-03 RX ORDER — KETOROLAC TROMETHAMINE 10 MG/1
10 TABLET, FILM COATED ORAL EVERY 8 HOURS PRN
Qty: 10 TABLET | Refills: 0 | Status: SHIPPED | OUTPATIENT
Start: 2021-05-03 | End: 2021-05-11 | Stop reason: SDUPTHER

## 2021-05-04 ENCOUNTER — ANESTHESIA EVENT (OUTPATIENT)
Dept: SURGERY | Facility: HOSPITAL | Age: 66
End: 2021-05-04
Payer: MEDICARE

## 2021-05-04 LAB — SARS-COV-2 RNA RESP QL NAA+PROBE: NOT DETECTED

## 2021-05-05 ENCOUNTER — HOSPITAL ENCOUNTER (OUTPATIENT)
Facility: HOSPITAL | Age: 66
Discharge: HOME | End: 2021-05-05
Attending: UROLOGY | Admitting: UROLOGY
Payer: MEDICARE

## 2021-05-05 ENCOUNTER — ANESTHESIA (OUTPATIENT)
Dept: SURGERY | Facility: HOSPITAL | Age: 66
End: 2021-05-05
Payer: MEDICARE

## 2021-05-05 ENCOUNTER — TELEPHONE (OUTPATIENT)
Dept: UROLOGY | Facility: CLINIC | Age: 66
End: 2021-05-05

## 2021-05-05 DIAGNOSIS — N20.1 RIGHT URETERAL STONE: ICD-10-CM

## 2021-05-05 PROCEDURE — 25500020 PHARM REV CODE 255: Performed by: UROLOGY

## 2021-05-05 PROCEDURE — 71000039 HC RECOVERY, EACH ADD'L HOUR: Performed by: UROLOGY

## 2021-05-05 PROCEDURE — 52356 PR CYSTO/URETERO W/LITHOTRIPSY: ICD-10-PCS | Mod: RT,,, | Performed by: UROLOGY

## 2021-05-05 PROCEDURE — 25000003 PHARM REV CODE 250: Performed by: ANESTHESIOLOGY

## 2021-05-05 PROCEDURE — 52356 CYSTO/URETERO W/LITHOTRIPSY: CPT | Mod: RT,,, | Performed by: UROLOGY

## 2021-05-05 PROCEDURE — C1769 GUIDE WIRE: HCPCS | Performed by: UROLOGY

## 2021-05-05 PROCEDURE — 71000016 HC POSTOP RECOV ADDL HR: Performed by: UROLOGY

## 2021-05-05 PROCEDURE — D9220A PRA ANESTHESIA: ICD-10-PCS | Mod: ANES,,, | Performed by: ANESTHESIOLOGY

## 2021-05-05 PROCEDURE — 63600175 PHARM REV CODE 636 W HCPCS: Performed by: ANESTHESIOLOGY

## 2021-05-05 PROCEDURE — 36000707: Performed by: UROLOGY

## 2021-05-05 PROCEDURE — 37000008 HC ANESTHESIA 1ST 15 MINUTES: Performed by: UROLOGY

## 2021-05-05 PROCEDURE — 27201423 OPTIME MED/SURG SUP & DEVICES STERILE SUPPLY: Performed by: UROLOGY

## 2021-05-05 PROCEDURE — D9220A PRA ANESTHESIA: Mod: CRNA,,, | Performed by: NURSE ANESTHETIST, CERTIFIED REGISTERED

## 2021-05-05 PROCEDURE — 25000003 PHARM REV CODE 250: Performed by: NURSE ANESTHETIST, CERTIFIED REGISTERED

## 2021-05-05 PROCEDURE — D9220A PRA ANESTHESIA: ICD-10-PCS | Mod: CRNA,,, | Performed by: NURSE ANESTHETIST, CERTIFIED REGISTERED

## 2021-05-05 PROCEDURE — 71000015 HC POSTOP RECOV 1ST HR: Performed by: UROLOGY

## 2021-05-05 PROCEDURE — C1758 CATHETER, URETERAL: HCPCS | Performed by: UROLOGY

## 2021-05-05 PROCEDURE — 74420 UROGRAPHY RTRGR +-KUB: CPT | Mod: 26,,, | Performed by: UROLOGY

## 2021-05-05 PROCEDURE — 63600175 PHARM REV CODE 636 W HCPCS: Performed by: NURSE ANESTHETIST, CERTIFIED REGISTERED

## 2021-05-05 PROCEDURE — 99900103 DSU ONLY-NO CHARGE-INITIAL HR (STAT): Performed by: UROLOGY

## 2021-05-05 PROCEDURE — 82365 CALCULUS SPECTROSCOPY: CPT | Performed by: UROLOGY

## 2021-05-05 PROCEDURE — 63600175 PHARM REV CODE 636 W HCPCS: Performed by: UROLOGY

## 2021-05-05 PROCEDURE — 37000009 HC ANESTHESIA EA ADD 15 MINS: Performed by: UROLOGY

## 2021-05-05 PROCEDURE — 74420 PR  X-RAY RETROGRADE PYELOGRAM: ICD-10-PCS | Mod: 26,,, | Performed by: UROLOGY

## 2021-05-05 PROCEDURE — C2617 STENT, NON-COR, TEM W/O DEL: HCPCS | Performed by: UROLOGY

## 2021-05-05 PROCEDURE — 71000033 HC RECOVERY, INTIAL HOUR: Performed by: UROLOGY

## 2021-05-05 PROCEDURE — 36000706: Performed by: UROLOGY

## 2021-05-05 PROCEDURE — 99900104 DSU ONLY-NO CHARGE-EA ADD'L HR (STAT): Performed by: UROLOGY

## 2021-05-05 PROCEDURE — D9220A PRA ANESTHESIA: Mod: ANES,,, | Performed by: ANESTHESIOLOGY

## 2021-05-05 DEVICE — STENT SET URETERAL 6X24CM: Type: IMPLANTABLE DEVICE | Site: URETER | Status: FUNCTIONAL

## 2021-05-05 RX ORDER — SODIUM CHLORIDE, SODIUM LACTATE, POTASSIUM CHLORIDE, CALCIUM CHLORIDE 600; 310; 30; 20 MG/100ML; MG/100ML; MG/100ML; MG/100ML
INJECTION, SOLUTION INTRAVENOUS CONTINUOUS
Status: DISCONTINUED | OUTPATIENT
Start: 2021-05-05 | End: 2021-05-05 | Stop reason: HOSPADM

## 2021-05-05 RX ORDER — FENTANYL CITRATE 50 UG/ML
25 INJECTION, SOLUTION INTRAMUSCULAR; INTRAVENOUS EVERY 5 MIN PRN
Status: COMPLETED | OUTPATIENT
Start: 2021-05-05 | End: 2021-05-05

## 2021-05-05 RX ORDER — SUCCINYLCHOLINE CHLORIDE 20 MG/ML
INJECTION INTRAMUSCULAR; INTRAVENOUS
Status: DISCONTINUED | OUTPATIENT
Start: 2021-05-05 | End: 2021-05-05

## 2021-05-05 RX ORDER — PHENAZOPYRIDINE HYDROCHLORIDE 200 MG/1
200 TABLET, FILM COATED ORAL ONCE
Status: COMPLETED | OUTPATIENT
Start: 2021-05-05 | End: 2021-05-05

## 2021-05-05 RX ORDER — MIDAZOLAM HYDROCHLORIDE 1 MG/ML
INJECTION INTRAMUSCULAR; INTRAVENOUS
Status: DISCONTINUED | OUTPATIENT
Start: 2021-05-05 | End: 2021-05-05

## 2021-05-05 RX ORDER — ONDANSETRON HYDROCHLORIDE 2 MG/ML
INJECTION, SOLUTION INTRAMUSCULAR; INTRAVENOUS
Status: DISCONTINUED | OUTPATIENT
Start: 2021-05-05 | End: 2021-05-05

## 2021-05-05 RX ORDER — CIPROFLOXACIN 2 MG/ML
400 INJECTION, SOLUTION INTRAVENOUS
Status: COMPLETED | OUTPATIENT
Start: 2021-05-05 | End: 2021-05-05

## 2021-05-05 RX ORDER — ONDANSETRON 2 MG/ML
4 INJECTION INTRAMUSCULAR; INTRAVENOUS ONCE
Status: COMPLETED | OUTPATIENT
Start: 2021-05-05 | End: 2021-05-05

## 2021-05-05 RX ORDER — PHENYLEPHRINE HYDROCHLORIDE 10 MG/ML
INJECTION INTRAVENOUS
Status: DISCONTINUED | OUTPATIENT
Start: 2021-05-05 | End: 2021-05-05

## 2021-05-05 RX ORDER — EPHEDRINE SULFATE 50 MG/ML
INJECTION, SOLUTION INTRAVENOUS
Status: DISCONTINUED | OUTPATIENT
Start: 2021-05-05 | End: 2021-05-05

## 2021-05-05 RX ORDER — DEXAMETHASONE SODIUM PHOSPHATE 4 MG/ML
INJECTION, SOLUTION INTRA-ARTICULAR; INTRALESIONAL; INTRAMUSCULAR; INTRAVENOUS; SOFT TISSUE
Status: DISCONTINUED | OUTPATIENT
Start: 2021-05-05 | End: 2021-05-05

## 2021-05-05 RX ORDER — LIDOCAINE HCL/PF 100 MG/5ML
SYRINGE (ML) INTRAVENOUS
Status: DISCONTINUED | OUTPATIENT
Start: 2021-05-05 | End: 2021-05-05

## 2021-05-05 RX ORDER — OXYCODONE HYDROCHLORIDE 5 MG/1
5 TABLET ORAL
Status: DISCONTINUED | OUTPATIENT
Start: 2021-05-05 | End: 2021-05-05 | Stop reason: HOSPADM

## 2021-05-05 RX ORDER — FENTANYL CITRATE 50 UG/ML
INJECTION, SOLUTION INTRAMUSCULAR; INTRAVENOUS
Status: DISCONTINUED | OUTPATIENT
Start: 2021-05-05 | End: 2021-05-05

## 2021-05-05 RX ORDER — PROPOFOL 10 MG/ML
VIAL (ML) INTRAVENOUS
Status: DISCONTINUED | OUTPATIENT
Start: 2021-05-05 | End: 2021-05-05

## 2021-05-05 RX ORDER — HYDROMORPHONE HYDROCHLORIDE 2 MG/ML
0.2 INJECTION, SOLUTION INTRAMUSCULAR; INTRAVENOUS; SUBCUTANEOUS EVERY 5 MIN PRN
Status: DISCONTINUED | OUTPATIENT
Start: 2021-05-05 | End: 2021-05-05 | Stop reason: HOSPADM

## 2021-05-05 RX ORDER — LIDOCAINE HYDROCHLORIDE 10 MG/ML
1 INJECTION, SOLUTION EPIDURAL; INFILTRATION; INTRACAUDAL; PERINEURAL ONCE
Status: DISCONTINUED | OUTPATIENT
Start: 2021-05-05 | End: 2021-05-05 | Stop reason: HOSPADM

## 2021-05-05 RX ORDER — ROCURONIUM BROMIDE 10 MG/ML
INJECTION, SOLUTION INTRAVENOUS
Status: DISCONTINUED | OUTPATIENT
Start: 2021-05-05 | End: 2021-05-05

## 2021-05-05 RX ADMIN — ONDANSETRON 4 MG: 2 INJECTION, SOLUTION INTRAMUSCULAR; INTRAVENOUS at 11:05

## 2021-05-05 RX ADMIN — DEXAMETHASONE SODIUM PHOSPHATE 4 MG: 4 INJECTION, SOLUTION INTRA-ARTICULAR; INTRALESIONAL; INTRAMUSCULAR; INTRAVENOUS; SOFT TISSUE at 11:05

## 2021-05-05 RX ADMIN — OXYCODONE 5 MG: 5 TABLET ORAL at 12:05

## 2021-05-05 RX ADMIN — MIDAZOLAM HYDROCHLORIDE 2 MG: 1 INJECTION, SOLUTION INTRAMUSCULAR; INTRAVENOUS at 11:05

## 2021-05-05 RX ADMIN — FENTANYL CITRATE 25 MCG: 50 INJECTION INTRAMUSCULAR; INTRAVENOUS at 01:05

## 2021-05-05 RX ADMIN — EPHEDRINE SULFATE 25 MG: 50 INJECTION, SOLUTION INTRAMUSCULAR; INTRAVENOUS; SUBCUTANEOUS at 12:05

## 2021-05-05 RX ADMIN — SUCCINYLCHOLINE CHLORIDE 140 MG: 20 INJECTION, SOLUTION INTRAMUSCULAR; INTRAVENOUS; PARENTERAL at 11:05

## 2021-05-05 RX ADMIN — SODIUM CHLORIDE, SODIUM GLUCONATE, SODIUM ACETATE, POTASSIUM CHLORIDE, MAGNESIUM CHLORIDE, SODIUM PHOSPHATE, DIBASIC, AND POTASSIUM PHOSPHATE: .53; .5; .37; .037; .03; .012; .00082 INJECTION, SOLUTION INTRAVENOUS at 09:05

## 2021-05-05 RX ADMIN — PHENAZOPYRIDINE HYDROCHLORIDE 200 MG: 200 TABLET ORAL at 01:05

## 2021-05-05 RX ADMIN — CIPROFLOXACIN 400 MG: 2 INJECTION INTRAVENOUS at 11:05

## 2021-05-05 RX ADMIN — ROCURONIUM BROMIDE 5 MG: 10 INJECTION, SOLUTION INTRAVENOUS at 11:05

## 2021-05-05 RX ADMIN — PROPOFOL 150 MG: 10 INJECTION, EMULSION INTRAVENOUS at 11:05

## 2021-05-05 RX ADMIN — PHENYLEPHRINE HYDROCHLORIDE 100 MCG: 10 INJECTION INTRAVENOUS at 12:05

## 2021-05-05 RX ADMIN — LIDOCAINE HYDROCHLORIDE 100 MG: 20 INJECTION, SOLUTION INTRAVENOUS at 11:05

## 2021-05-05 RX ADMIN — ONDANSETRON 4 MG: 2 INJECTION INTRAMUSCULAR; INTRAVENOUS at 03:05

## 2021-05-05 RX ADMIN — FENTANYL CITRATE 100 MCG: 50 INJECTION, SOLUTION INTRAMUSCULAR; INTRAVENOUS at 11:05

## 2021-05-06 VITALS
OXYGEN SATURATION: 95 % | TEMPERATURE: 98 F | HEART RATE: 77 BPM | RESPIRATION RATE: 16 BRPM | DIASTOLIC BLOOD PRESSURE: 70 MMHG | SYSTOLIC BLOOD PRESSURE: 172 MMHG

## 2021-05-07 ENCOUNTER — PATIENT MESSAGE (OUTPATIENT)
Dept: UROLOGY | Facility: CLINIC | Age: 66
End: 2021-05-07

## 2021-05-07 LAB
COMPN STONE: NORMAL
SPECIMEN SOURCE: NORMAL
STONE ANALYSIS IR-IMP: NORMAL

## 2021-05-10 ENCOUNTER — PATIENT MESSAGE (OUTPATIENT)
Dept: RESEARCH | Facility: HOSPITAL | Age: 66
End: 2021-05-10

## 2021-05-11 RX ORDER — KETOROLAC TROMETHAMINE 10 MG/1
10 TABLET, FILM COATED ORAL EVERY 8 HOURS PRN
Qty: 10 TABLET | Refills: 0 | Status: SHIPPED | OUTPATIENT
Start: 2021-05-11 | End: 2021-05-13

## 2021-05-12 ENCOUNTER — HOSPITAL ENCOUNTER (EMERGENCY)
Facility: HOSPITAL | Age: 66
Discharge: HOME | End: 2021-05-12
Attending: EMERGENCY MEDICINE
Payer: MEDICARE

## 2021-05-12 ENCOUNTER — NURSE TRIAGE (OUTPATIENT)
Dept: ADMINISTRATIVE | Facility: CLINIC | Age: 66
End: 2021-05-12

## 2021-05-12 VITALS
DIASTOLIC BLOOD PRESSURE: 81 MMHG | RESPIRATION RATE: 19 BRPM | HEART RATE: 85 BPM | BODY MASS INDEX: 27.16 KG/M2 | OXYGEN SATURATION: 97 % | WEIGHT: 173.06 LBS | SYSTOLIC BLOOD PRESSURE: 191 MMHG | HEIGHT: 67 IN | TEMPERATURE: 98 F

## 2021-05-12 DIAGNOSIS — R31.9 URINARY TRACT INFECTION WITH HEMATURIA, SITE UNSPECIFIED: ICD-10-CM

## 2021-05-12 DIAGNOSIS — R10.9 ACUTE RIGHT FLANK PAIN: ICD-10-CM

## 2021-05-12 DIAGNOSIS — N39.0 URINARY TRACT INFECTION WITH HEMATURIA, SITE UNSPECIFIED: ICD-10-CM

## 2021-05-12 DIAGNOSIS — N32.81 OVERACTIVE BLADDER: Primary | ICD-10-CM

## 2021-05-12 LAB
ALBUMIN SERPL BCP-MCNC: 3.8 G/DL (ref 3.5–5.2)
ALP SERPL-CCNC: 53 U/L (ref 55–135)
ALT SERPL W/O P-5'-P-CCNC: 28 U/L (ref 10–44)
ANION GAP SERPL CALC-SCNC: 11 MMOL/L (ref 8–16)
AST SERPL-CCNC: 19 U/L (ref 10–40)
BACTERIA #/AREA URNS HPF: ABNORMAL /HPF
BACTERIA #/AREA URNS HPF: ABNORMAL /HPF
BASOPHILS # BLD AUTO: 0.09 K/UL (ref 0–0.2)
BASOPHILS NFR BLD: 1 % (ref 0–1.9)
BILIRUB SERPL-MCNC: 0.4 MG/DL (ref 0.1–1)
BILIRUB UR QL STRIP: NEGATIVE
BILIRUB UR QL STRIP: NEGATIVE
BUN SERPL-MCNC: 34 MG/DL (ref 8–23)
CALCIUM SERPL-MCNC: 11 MG/DL (ref 8.7–10.5)
CHLORIDE SERPL-SCNC: 107 MMOL/L (ref 95–110)
CLARITY UR: ABNORMAL
CLARITY UR: CLEAR
CO2 SERPL-SCNC: 25 MMOL/L (ref 23–29)
COLOR UR: YELLOW
COLOR UR: YELLOW
CREAT SERPL-MCNC: 1 MG/DL (ref 0.5–1.4)
DIFFERENTIAL METHOD: ABNORMAL
EOSINOPHIL # BLD AUTO: 0.2 K/UL (ref 0–0.5)
EOSINOPHIL NFR BLD: 2 % (ref 0–8)
ERYTHROCYTE [DISTWIDTH] IN BLOOD BY AUTOMATED COUNT: 13.3 % (ref 11.5–14.5)
EST. GFR  (AFRICAN AMERICAN): >60 ML/MIN/1.73 M^2
EST. GFR  (NON AFRICAN AMERICAN): 59 ML/MIN/1.73 M^2
GLUCOSE SERPL-MCNC: 93 MG/DL (ref 70–110)
GLUCOSE UR QL STRIP: NEGATIVE
GLUCOSE UR QL STRIP: NEGATIVE
HCT VFR BLD AUTO: 42.3 % (ref 37–48.5)
HGB BLD-MCNC: 13.1 G/DL (ref 12–16)
HGB UR QL STRIP: ABNORMAL
HGB UR QL STRIP: ABNORMAL
HYALINE CASTS #/AREA URNS LPF: 0 /LPF
IMM GRANULOCYTES # BLD AUTO: 0.02 K/UL (ref 0–0.04)
IMM GRANULOCYTES NFR BLD AUTO: 0.2 % (ref 0–0.5)
KETONES UR QL STRIP: ABNORMAL
KETONES UR QL STRIP: NEGATIVE
LEUKOCYTE ESTERASE UR QL STRIP: ABNORMAL
LEUKOCYTE ESTERASE UR QL STRIP: ABNORMAL
LIPASE SERPL-CCNC: 19 U/L (ref 4–60)
LYMPHOCYTES # BLD AUTO: 1.6 K/UL (ref 1–4.8)
LYMPHOCYTES NFR BLD: 17.3 % (ref 18–48)
MCH RBC QN AUTO: 28.6 PG (ref 27–31)
MCHC RBC AUTO-ENTMCNC: 31 G/DL (ref 32–36)
MCV RBC AUTO: 92 FL (ref 82–98)
MICROSCOPIC COMMENT: ABNORMAL
MICROSCOPIC COMMENT: ABNORMAL
MONOCYTES # BLD AUTO: 0.7 K/UL (ref 0.3–1)
MONOCYTES NFR BLD: 8 % (ref 4–15)
NEUTROPHILS # BLD AUTO: 6.6 K/UL (ref 1.8–7.7)
NEUTROPHILS NFR BLD: 71.5 % (ref 38–73)
NITRITE UR QL STRIP: NEGATIVE
NITRITE UR QL STRIP: NEGATIVE
NRBC BLD-RTO: 0 /100 WBC
PH UR STRIP: 6 [PH] (ref 5–8)
PH UR STRIP: 7 [PH] (ref 5–8)
PLATELET # BLD AUTO: 383 K/UL (ref 150–450)
PMV BLD AUTO: 10 FL (ref 9.2–12.9)
POTASSIUM SERPL-SCNC: 4.5 MMOL/L (ref 3.5–5.1)
PROT SERPL-MCNC: 6.8 G/DL (ref 6–8.4)
PROT UR QL STRIP: ABNORMAL
PROT UR QL STRIP: NEGATIVE
RBC # BLD AUTO: 4.58 M/UL (ref 4–5.4)
RBC #/AREA URNS HPF: 16 /HPF (ref 0–4)
RBC #/AREA URNS HPF: >100 /HPF (ref 0–4)
SODIUM SERPL-SCNC: 143 MMOL/L (ref 136–145)
SP GR UR STRIP: 1.01 (ref 1–1.03)
SP GR UR STRIP: 1.02 (ref 1–1.03)
SQUAMOUS #/AREA URNS HPF: 1 /HPF
SQUAMOUS #/AREA URNS HPF: 7 /HPF
URN SPEC COLLECT METH UR: ABNORMAL
URN SPEC COLLECT METH UR: ABNORMAL
UROBILINOGEN UR STRIP-ACNC: NEGATIVE EU/DL
UROBILINOGEN UR STRIP-ACNC: NEGATIVE EU/DL
WBC # BLD AUTO: 9.21 K/UL (ref 3.9–12.7)
WBC #/AREA URNS HPF: 25 /HPF (ref 0–5)
WBC #/AREA URNS HPF: >100 /HPF (ref 0–5)

## 2021-05-12 PROCEDURE — 63600175 PHARM REV CODE 636 W HCPCS: Performed by: EMERGENCY MEDICINE

## 2021-05-12 PROCEDURE — 81000 URINALYSIS NONAUTO W/SCOPE: CPT | Mod: 91 | Performed by: EMERGENCY MEDICINE

## 2021-05-12 PROCEDURE — 83690 ASSAY OF LIPASE: CPT | Performed by: EMERGENCY MEDICINE

## 2021-05-12 PROCEDURE — 96361 HYDRATE IV INFUSION ADD-ON: CPT

## 2021-05-12 PROCEDURE — 85025 COMPLETE CBC W/AUTO DIFF WBC: CPT | Performed by: EMERGENCY MEDICINE

## 2021-05-12 PROCEDURE — 25500020 PHARM REV CODE 255

## 2021-05-12 PROCEDURE — 25000003 PHARM REV CODE 250: Performed by: EMERGENCY MEDICINE

## 2021-05-12 PROCEDURE — 96375 TX/PRO/DX INJ NEW DRUG ADDON: CPT

## 2021-05-12 PROCEDURE — 87086 URINE CULTURE/COLONY COUNT: CPT | Mod: 59 | Performed by: UROLOGY

## 2021-05-12 PROCEDURE — 96374 THER/PROPH/DIAG INJ IV PUSH: CPT | Mod: 59

## 2021-05-12 PROCEDURE — 80053 COMPREHEN METABOLIC PANEL: CPT | Performed by: EMERGENCY MEDICINE

## 2021-05-12 PROCEDURE — 36415 COLL VENOUS BLD VENIPUNCTURE: CPT | Performed by: EMERGENCY MEDICINE

## 2021-05-12 PROCEDURE — 87086 URINE CULTURE/COLONY COUNT: CPT | Performed by: EMERGENCY MEDICINE

## 2021-05-12 PROCEDURE — 99285 EMERGENCY DEPT VISIT HI MDM: CPT | Mod: 25

## 2021-05-12 RX ORDER — AMOXICILLIN AND CLAVULANATE POTASSIUM 875; 125 MG/1; MG/1
1 TABLET, FILM COATED ORAL 2 TIMES DAILY
Qty: 28 TABLET | Refills: 0 | Status: SHIPPED | OUTPATIENT
Start: 2021-05-12 | End: 2021-05-26

## 2021-05-12 RX ORDER — MORPHINE SULFATE 4 MG/ML
4 INJECTION, SOLUTION INTRAMUSCULAR; INTRAVENOUS
Status: COMPLETED | OUTPATIENT
Start: 2021-05-12 | End: 2021-05-12

## 2021-05-12 RX ORDER — ONDANSETRON 2 MG/ML
8 INJECTION INTRAMUSCULAR; INTRAVENOUS
Status: COMPLETED | OUTPATIENT
Start: 2021-05-12 | End: 2021-05-12

## 2021-05-12 RX ORDER — KETOROLAC TROMETHAMINE 30 MG/ML
15 INJECTION, SOLUTION INTRAMUSCULAR; INTRAVENOUS
Status: COMPLETED | OUTPATIENT
Start: 2021-05-12 | End: 2021-05-12

## 2021-05-12 RX ADMIN — MORPHINE SULFATE 4 MG: 4 INJECTION, SOLUTION INTRAMUSCULAR; INTRAVENOUS at 02:05

## 2021-05-12 RX ADMIN — CEFTRIAXONE 1 G: 1 INJECTION, SOLUTION INTRAVENOUS at 05:05

## 2021-05-12 RX ADMIN — SODIUM CHLORIDE 1000 ML: 0.9 INJECTION, SOLUTION INTRAVENOUS at 02:05

## 2021-05-12 RX ADMIN — IOHEXOL 75 ML: 350 INJECTION, SOLUTION INTRAVENOUS at 03:05

## 2021-05-12 RX ADMIN — KETOROLAC TROMETHAMINE 15 MG: 30 INJECTION, SOLUTION INTRAMUSCULAR; INTRAVENOUS at 06:05

## 2021-05-12 RX ADMIN — ONDANSETRON 8 MG: 2 INJECTION INTRAMUSCULAR; INTRAVENOUS at 02:05

## 2021-05-13 ENCOUNTER — OFFICE VISIT (OUTPATIENT)
Dept: UROLOGY | Facility: CLINIC | Age: 66
End: 2021-05-13
Payer: MEDICARE

## 2021-05-13 VITALS
BODY MASS INDEX: 27.41 KG/M2 | DIASTOLIC BLOOD PRESSURE: 74 MMHG | HEART RATE: 69 BPM | WEIGHT: 174.63 LBS | HEIGHT: 67 IN | SYSTOLIC BLOOD PRESSURE: 139 MMHG

## 2021-05-13 DIAGNOSIS — N30.10 IC (INTERSTITIAL CYSTITIS): ICD-10-CM

## 2021-05-13 DIAGNOSIS — N32.81 OAB (OVERACTIVE BLADDER): ICD-10-CM

## 2021-05-13 DIAGNOSIS — N20.1 RIGHT URETERAL STONE: ICD-10-CM

## 2021-05-13 DIAGNOSIS — N13.30 HYDRONEPHROSIS, UNSPECIFIED HYDRONEPHROSIS TYPE: Primary | ICD-10-CM

## 2021-05-13 LAB
BILIRUB SERPL-MCNC: ABNORMAL MG/DL
BLOOD URINE, POC: ABNORMAL
CLARITY, POC UA: CLEAR
COLOR, POC UA: YELLOW
GLUCOSE UR QL STRIP: ABNORMAL
KETONES UR QL STRIP: ABNORMAL
LEUKOCYTE ESTERASE URINE, POC: ABNORMAL
NITRITE, POC UA: ABNORMAL
PH, POC UA: 5.5
PROTEIN, POC: 30
SPECIFIC GRAVITY, POC UA: 1.03
UROBILINOGEN, POC UA: 0.2

## 2021-05-13 PROCEDURE — 99999 PR PBB SHADOW E&M-EST. PATIENT-LVL IV: CPT | Mod: PBBFAC,,, | Performed by: UROLOGY

## 2021-05-13 PROCEDURE — 99214 OFFICE O/P EST MOD 30 MIN: CPT | Mod: S$GLB,,, | Performed by: UROLOGY

## 2021-05-13 PROCEDURE — 99214 PR OFFICE/OUTPT VISIT, EST, LEVL IV, 30-39 MIN: ICD-10-PCS | Mod: S$GLB,,, | Performed by: UROLOGY

## 2021-05-13 PROCEDURE — 3008F PR BODY MASS INDEX (BMI) DOCUMENTED: ICD-10-PCS | Mod: CPTII,S$GLB,, | Performed by: UROLOGY

## 2021-05-13 PROCEDURE — 3008F BODY MASS INDEX DOCD: CPT | Mod: CPTII,S$GLB,, | Performed by: UROLOGY

## 2021-05-13 PROCEDURE — 99999 PR PBB SHADOW E&M-EST. PATIENT-LVL IV: ICD-10-PCS | Mod: PBBFAC,,, | Performed by: UROLOGY

## 2021-05-13 RX ORDER — ONDANSETRON 4 MG/1
4 TABLET, ORALLY DISINTEGRATING ORAL EVERY 8 HOURS PRN
Qty: 10 TABLET | Refills: 0 | Status: SHIPPED | OUTPATIENT
Start: 2021-05-13 | End: 2021-05-20

## 2021-05-13 RX ORDER — MIRABEGRON 50 MG/1
50 TABLET, FILM COATED, EXTENDED RELEASE ORAL DAILY
Qty: 90 TABLET | Refills: 3 | Status: SHIPPED | OUTPATIENT
Start: 2021-05-13 | End: 2022-05-24 | Stop reason: SDUPTHER

## 2021-05-13 RX ORDER — OXYBUTYNIN CHLORIDE 10 MG/1
10 TABLET, EXTENDED RELEASE ORAL DAILY
Qty: 30 TABLET | Refills: 0 | Status: SHIPPED | OUTPATIENT
Start: 2021-05-13 | End: 2021-11-18

## 2021-05-14 LAB
BACTERIA UR CULT: NO GROWTH
BACTERIA UR CULT: NORMAL

## 2021-05-27 ENCOUNTER — HOSPITAL ENCOUNTER (OUTPATIENT)
Dept: RADIOLOGY | Facility: HOSPITAL | Age: 66
Discharge: HOME | End: 2021-05-27
Attending: UROLOGY
Payer: MEDICARE

## 2021-05-27 DIAGNOSIS — N13.30 HYDRONEPHROSIS, UNSPECIFIED HYDRONEPHROSIS TYPE: ICD-10-CM

## 2021-05-27 PROCEDURE — 76770 US EXAM ABDO BACK WALL COMP: CPT | Mod: 26,,, | Performed by: RADIOLOGY

## 2021-05-27 PROCEDURE — 76770 US EXAM ABDO BACK WALL COMP: CPT | Mod: TC

## 2021-05-27 PROCEDURE — 76770 US RETROPERITONEAL COMPLETE: ICD-10-PCS | Mod: 26,,, | Performed by: RADIOLOGY

## 2021-06-07 ENCOUNTER — HOSPITAL ENCOUNTER (OUTPATIENT)
Dept: RADIOLOGY | Facility: HOSPITAL | Age: 66
Discharge: HOME | End: 2021-06-07
Attending: OBSTETRICS & GYNECOLOGY
Payer: MEDICARE

## 2021-06-07 VITALS — WEIGHT: 174.63 LBS | BODY MASS INDEX: 27.41 KG/M2 | HEIGHT: 67 IN

## 2021-06-07 DIAGNOSIS — M85.88 MASS OF HARD PALATE: ICD-10-CM

## 2021-06-07 DIAGNOSIS — Z12.31 ENCOUNTER FOR SCREENING MAMMOGRAM FOR MALIGNANT NEOPLASM OF BREAST: ICD-10-CM

## 2021-06-07 PROCEDURE — 77080 DXA BONE DENSITY AXIAL: CPT | Mod: TC,PO

## 2021-06-07 PROCEDURE — 77067 SCR MAMMO BI INCL CAD: CPT | Mod: TC,PO

## 2021-06-11 ENCOUNTER — PATIENT MESSAGE (OUTPATIENT)
Dept: UROLOGY | Facility: CLINIC | Age: 66
End: 2021-06-11

## 2021-07-12 ENCOUNTER — PATIENT MESSAGE (OUTPATIENT)
Dept: PULMONOLOGY | Facility: CLINIC | Age: 66
End: 2021-07-12

## 2021-07-13 ENCOUNTER — PATIENT MESSAGE (OUTPATIENT)
Dept: PULMONOLOGY | Facility: CLINIC | Age: 66
End: 2021-07-13

## 2021-07-13 DIAGNOSIS — R09.89 CHRONIC SINUS COMPLAINTS: ICD-10-CM

## 2021-07-13 RX ORDER — MONTELUKAST SODIUM 10 MG/1
10 TABLET ORAL NIGHTLY
Qty: 90 TABLET | Refills: 3 | OUTPATIENT
Start: 2021-07-13

## 2021-07-20 ENCOUNTER — PATIENT MESSAGE (OUTPATIENT)
Dept: PULMONOLOGY | Facility: CLINIC | Age: 66
End: 2021-07-20

## 2021-07-29 ENCOUNTER — PATIENT MESSAGE (OUTPATIENT)
Dept: UROLOGY | Facility: CLINIC | Age: 66
End: 2021-07-29

## 2021-07-30 ENCOUNTER — PATIENT MESSAGE (OUTPATIENT)
Dept: UROLOGY | Facility: CLINIC | Age: 66
End: 2021-07-30

## 2021-08-02 ENCOUNTER — PATIENT MESSAGE (OUTPATIENT)
Dept: UROLOGY | Facility: CLINIC | Age: 66
End: 2021-08-02

## 2021-08-03 ENCOUNTER — PATIENT MESSAGE (OUTPATIENT)
Dept: UROLOGY | Facility: CLINIC | Age: 66
End: 2021-08-03

## 2021-08-04 ENCOUNTER — CLINICAL SUPPORT (OUTPATIENT)
Dept: UROLOGY | Facility: CLINIC | Age: 66
End: 2021-08-04
Payer: MEDICARE

## 2021-08-04 ENCOUNTER — TELEPHONE (OUTPATIENT)
Dept: UROLOGY | Facility: CLINIC | Age: 66
End: 2021-08-04

## 2021-08-04 DIAGNOSIS — R30.0 DYSURIA: Primary | ICD-10-CM

## 2021-08-04 LAB
BILIRUB SERPL-MCNC: NEGATIVE MG/DL
BLOOD URINE, POC: NEGATIVE
CLARITY, POC UA: CLEAR
COLOR, POC UA: YELLOW
GLUCOSE UR QL STRIP: NEGATIVE
KETONES UR QL STRIP: NEGATIVE
LEUKOCYTE ESTERASE URINE, POC: ABNORMAL
NITRITE, POC UA: NEGATIVE
PH, POC UA: 6.5
PROTEIN, POC: NEGATIVE
SPECIFIC GRAVITY, POC UA: 1.03
UROBILINOGEN, POC UA: 1

## 2021-08-04 PROCEDURE — 81002 POCT URINE DIPSTICK WITHOUT MICROSCOPE: ICD-10-PCS | Mod: S$GLB,,, | Performed by: NURSE PRACTITIONER

## 2021-08-04 PROCEDURE — 87086 URINE CULTURE/COLONY COUNT: CPT | Performed by: NURSE PRACTITIONER

## 2021-08-04 PROCEDURE — 87186 SC STD MICRODIL/AGAR DIL: CPT | Performed by: NURSE PRACTITIONER

## 2021-08-04 PROCEDURE — 81002 URINALYSIS NONAUTO W/O SCOPE: CPT | Mod: S$GLB,,, | Performed by: NURSE PRACTITIONER

## 2021-08-04 PROCEDURE — 99499 NO LOS: ICD-10-PCS | Mod: S$GLB,,, | Performed by: NURSE PRACTITIONER

## 2021-08-04 PROCEDURE — 87088 URINE BACTERIA CULTURE: CPT | Performed by: NURSE PRACTITIONER

## 2021-08-04 PROCEDURE — 87077 CULTURE AEROBIC IDENTIFY: CPT | Performed by: NURSE PRACTITIONER

## 2021-08-04 PROCEDURE — 99499 UNLISTED E&M SERVICE: CPT | Mod: S$GLB,,, | Performed by: NURSE PRACTITIONER

## 2021-08-04 RX ORDER — FLUCONAZOLE 150 MG/1
150 TABLET ORAL DAILY
Qty: 3 TABLET | Refills: 0 | Status: SHIPPED | OUTPATIENT
Start: 2021-08-04 | End: 2021-08-07

## 2021-08-04 RX ORDER — DOXYCYCLINE HYCLATE 100 MG
100 TABLET ORAL 2 TIMES DAILY
Qty: 20 TABLET | Refills: 0 | Status: SHIPPED | OUTPATIENT
Start: 2021-08-04 | End: 2021-08-14

## 2021-08-07 LAB — BACTERIA UR CULT: ABNORMAL

## 2021-08-10 ENCOUNTER — OFFICE VISIT (OUTPATIENT)
Dept: PODIATRY | Facility: CLINIC | Age: 66
End: 2021-08-10
Payer: MEDICARE

## 2021-08-10 ENCOUNTER — HOSPITAL ENCOUNTER (OUTPATIENT)
Dept: RADIOLOGY | Facility: CLINIC | Age: 66
Discharge: HOME | End: 2021-08-10
Attending: PODIATRIST
Payer: MEDICARE

## 2021-08-10 VITALS
HEIGHT: 67 IN | HEART RATE: 66 BPM | WEIGHT: 175 LBS | OXYGEN SATURATION: 99 % | BODY MASS INDEX: 27.47 KG/M2 | RESPIRATION RATE: 18 BRPM | DIASTOLIC BLOOD PRESSURE: 72 MMHG | SYSTOLIC BLOOD PRESSURE: 134 MMHG

## 2021-08-10 DIAGNOSIS — M72.2 PLANTAR FASCIITIS: Primary | ICD-10-CM

## 2021-08-10 DIAGNOSIS — M79.672 HEEL PAIN, BILATERAL: ICD-10-CM

## 2021-08-10 DIAGNOSIS — M79.671 HEEL PAIN, BILATERAL: ICD-10-CM

## 2021-08-10 DIAGNOSIS — G60.9 IDIOPATHIC PERIPHERAL NEUROPATHY: ICD-10-CM

## 2021-08-10 PROCEDURE — 73630 X-RAY EXAM OF FOOT: CPT | Mod: 50,S$GLB,, | Performed by: RADIOLOGY

## 2021-08-10 PROCEDURE — 1160F RVW MEDS BY RX/DR IN RCRD: CPT | Mod: CPTII,S$GLB,, | Performed by: PODIATRIST

## 2021-08-10 PROCEDURE — 1159F MED LIST DOCD IN RCRD: CPT | Mod: CPTII,S$GLB,, | Performed by: PODIATRIST

## 2021-08-10 PROCEDURE — 3078F DIAST BP <80 MM HG: CPT | Mod: CPTII,S$GLB,, | Performed by: PODIATRIST

## 2021-08-10 PROCEDURE — 3288F FALL RISK ASSESSMENT DOCD: CPT | Mod: CPTII,S$GLB,, | Performed by: PODIATRIST

## 2021-08-10 PROCEDURE — 3075F PR MOST RECENT SYSTOLIC BLOOD PRESS GE 130-139MM HG: ICD-10-PCS | Mod: CPTII,S$GLB,, | Performed by: PODIATRIST

## 2021-08-10 PROCEDURE — 99214 PR OFFICE/OUTPT VISIT, EST, LEVL IV, 30-39 MIN: ICD-10-PCS | Mod: S$GLB,,, | Performed by: PODIATRIST

## 2021-08-10 PROCEDURE — 1160F PR REVIEW ALL MEDS BY PRESCRIBER/CLIN PHARMACIST DOCUMENTED: ICD-10-PCS | Mod: CPTII,S$GLB,, | Performed by: PODIATRIST

## 2021-08-10 PROCEDURE — 3288F PR FALLS RISK ASSESSMENT DOCUMENTED: ICD-10-PCS | Mod: CPTII,S$GLB,, | Performed by: PODIATRIST

## 2021-08-10 PROCEDURE — 1101F PT FALLS ASSESS-DOCD LE1/YR: CPT | Mod: CPTII,S$GLB,, | Performed by: PODIATRIST

## 2021-08-10 PROCEDURE — 1125F PR PAIN SEVERITY QUANTIFIED, PAIN PRESENT: ICD-10-PCS | Mod: CPTII,S$GLB,, | Performed by: PODIATRIST

## 2021-08-10 PROCEDURE — 1125F AMNT PAIN NOTED PAIN PRSNT: CPT | Mod: CPTII,S$GLB,, | Performed by: PODIATRIST

## 2021-08-10 PROCEDURE — 3008F PR BODY MASS INDEX (BMI) DOCUMENTED: ICD-10-PCS | Mod: CPTII,S$GLB,, | Performed by: PODIATRIST

## 2021-08-10 PROCEDURE — 1159F PR MEDICATION LIST DOCUMENTED IN MEDICAL RECORD: ICD-10-PCS | Mod: CPTII,S$GLB,, | Performed by: PODIATRIST

## 2021-08-10 PROCEDURE — 73630 XR FOOT COMPLETE 3 VIEW BILATERAL: ICD-10-PCS | Mod: 50,S$GLB,, | Performed by: RADIOLOGY

## 2021-08-10 PROCEDURE — 99214 OFFICE O/P EST MOD 30 MIN: CPT | Mod: S$GLB,,, | Performed by: PODIATRIST

## 2021-08-10 PROCEDURE — 1101F PR PT FALLS ASSESS DOC 0-1 FALLS W/OUT INJ PAST YR: ICD-10-PCS | Mod: CPTII,S$GLB,, | Performed by: PODIATRIST

## 2021-08-10 PROCEDURE — 3075F SYST BP GE 130 - 139MM HG: CPT | Mod: CPTII,S$GLB,, | Performed by: PODIATRIST

## 2021-08-10 PROCEDURE — 3008F BODY MASS INDEX DOCD: CPT | Mod: CPTII,S$GLB,, | Performed by: PODIATRIST

## 2021-08-10 PROCEDURE — 3078F PR MOST RECENT DIASTOLIC BLOOD PRESSURE < 80 MM HG: ICD-10-PCS | Mod: CPTII,S$GLB,, | Performed by: PODIATRIST

## 2021-08-10 RX ORDER — DOXYCYCLINE 100 MG/1
TABLET ORAL
COMMUNITY
Start: 2021-08-04 | End: 2021-10-06

## 2021-08-10 RX ORDER — SPIRONOLACTONE 100 MG/1
50 TABLET, FILM COATED ORAL DAILY
COMMUNITY
Start: 2021-08-03

## 2021-08-10 RX ORDER — MELOXICAM 15 MG/1
15 TABLET ORAL DAILY
Qty: 30 TABLET | Refills: 1 | Status: SHIPPED | OUTPATIENT
Start: 2021-08-10 | End: 2021-09-28 | Stop reason: SDUPTHER

## 2021-09-28 ENCOUNTER — OFFICE VISIT (OUTPATIENT)
Dept: PODIATRY | Facility: CLINIC | Age: 66
End: 2021-09-28
Payer: MEDICARE

## 2021-09-28 VITALS
HEIGHT: 67 IN | WEIGHT: 169 LBS | OXYGEN SATURATION: 98 % | BODY MASS INDEX: 26.53 KG/M2 | RESPIRATION RATE: 18 BRPM | DIASTOLIC BLOOD PRESSURE: 74 MMHG | SYSTOLIC BLOOD PRESSURE: 130 MMHG | HEART RATE: 68 BPM

## 2021-09-28 DIAGNOSIS — G60.9 IDIOPATHIC PERIPHERAL NEUROPATHY: ICD-10-CM

## 2021-09-28 DIAGNOSIS — M72.2 PLANTAR FASCIITIS: Primary | ICD-10-CM

## 2021-09-28 DIAGNOSIS — M79.671 HEEL PAIN, BILATERAL: ICD-10-CM

## 2021-09-28 DIAGNOSIS — M79.672 HEEL PAIN, BILATERAL: ICD-10-CM

## 2021-09-28 PROCEDURE — 3075F PR MOST RECENT SYSTOLIC BLOOD PRESS GE 130-139MM HG: ICD-10-PCS | Mod: CPTII,S$GLB,, | Performed by: PODIATRIST

## 2021-09-28 PROCEDURE — 1159F MED LIST DOCD IN RCRD: CPT | Mod: CPTII,S$GLB,, | Performed by: PODIATRIST

## 2021-09-28 PROCEDURE — 1125F PR PAIN SEVERITY QUANTIFIED, PAIN PRESENT: ICD-10-PCS | Mod: CPTII,S$GLB,, | Performed by: PODIATRIST

## 2021-09-28 PROCEDURE — 1160F PR REVIEW ALL MEDS BY PRESCRIBER/CLIN PHARMACIST DOCUMENTED: ICD-10-PCS | Mod: CPTII,S$GLB,, | Performed by: PODIATRIST

## 2021-09-28 PROCEDURE — 99213 OFFICE O/P EST LOW 20 MIN: CPT | Mod: 25,S$GLB,, | Performed by: PODIATRIST

## 2021-09-28 PROCEDURE — 3288F FALL RISK ASSESSMENT DOCD: CPT | Mod: CPTII,S$GLB,, | Performed by: PODIATRIST

## 2021-09-28 PROCEDURE — 20550 PR INJECT TENDON SHEATH/LIGAMENT: ICD-10-PCS | Mod: 50,S$GLB,, | Performed by: PODIATRIST

## 2021-09-28 PROCEDURE — 1159F PR MEDICATION LIST DOCUMENTED IN MEDICAL RECORD: ICD-10-PCS | Mod: CPTII,S$GLB,, | Performed by: PODIATRIST

## 2021-09-28 PROCEDURE — 3008F PR BODY MASS INDEX (BMI) DOCUMENTED: ICD-10-PCS | Mod: CPTII,S$GLB,, | Performed by: PODIATRIST

## 2021-09-28 PROCEDURE — 3078F PR MOST RECENT DIASTOLIC BLOOD PRESSURE < 80 MM HG: ICD-10-PCS | Mod: CPTII,S$GLB,, | Performed by: PODIATRIST

## 2021-09-28 PROCEDURE — 1125F AMNT PAIN NOTED PAIN PRSNT: CPT | Mod: CPTII,S$GLB,, | Performed by: PODIATRIST

## 2021-09-28 PROCEDURE — 1101F PT FALLS ASSESS-DOCD LE1/YR: CPT | Mod: CPTII,S$GLB,, | Performed by: PODIATRIST

## 2021-09-28 PROCEDURE — 20550 NJX 1 TENDON SHEATH/LIGAMENT: CPT | Mod: 50,S$GLB,, | Performed by: PODIATRIST

## 2021-09-28 PROCEDURE — 1160F RVW MEDS BY RX/DR IN RCRD: CPT | Mod: CPTII,S$GLB,, | Performed by: PODIATRIST

## 2021-09-28 PROCEDURE — 3075F SYST BP GE 130 - 139MM HG: CPT | Mod: CPTII,S$GLB,, | Performed by: PODIATRIST

## 2021-09-28 PROCEDURE — 99213 PR OFFICE/OUTPT VISIT, EST, LEVL III, 20-29 MIN: ICD-10-PCS | Mod: 25,S$GLB,, | Performed by: PODIATRIST

## 2021-09-28 PROCEDURE — 3288F PR FALLS RISK ASSESSMENT DOCUMENTED: ICD-10-PCS | Mod: CPTII,S$GLB,, | Performed by: PODIATRIST

## 2021-09-28 PROCEDURE — 3008F BODY MASS INDEX DOCD: CPT | Mod: CPTII,S$GLB,, | Performed by: PODIATRIST

## 2021-09-28 PROCEDURE — 1101F PR PT FALLS ASSESS DOC 0-1 FALLS W/OUT INJ PAST YR: ICD-10-PCS | Mod: CPTII,S$GLB,, | Performed by: PODIATRIST

## 2021-09-28 PROCEDURE — 3078F DIAST BP <80 MM HG: CPT | Mod: CPTII,S$GLB,, | Performed by: PODIATRIST

## 2021-09-28 RX ORDER — DEXAMETHASONE SODIUM PHOSPHATE 4 MG/ML
4 INJECTION, SOLUTION INTRA-ARTICULAR; INTRALESIONAL; INTRAMUSCULAR; INTRAVENOUS; SOFT TISSUE
Status: DISCONTINUED | OUTPATIENT
Start: 2021-09-28 | End: 2022-09-27

## 2021-09-28 RX ORDER — MELOXICAM 15 MG/1
15 TABLET ORAL DAILY
Qty: 30 TABLET | Refills: 2 | Status: SHIPPED | OUTPATIENT
Start: 2021-09-28 | End: 2021-11-18

## 2021-09-28 RX ORDER — METHYLPREDNISOLONE ACETATE 40 MG/ML
20 INJECTION, SUSPENSION INTRA-ARTICULAR; INTRALESIONAL; INTRAMUSCULAR; SOFT TISSUE
Status: DISCONTINUED | OUTPATIENT
Start: 2021-09-28 | End: 2021-11-18

## 2021-09-28 RX ORDER — BUPIVACAINE HYDROCHLORIDE 5 MG/ML
1.5 INJECTION, SOLUTION PERINEURAL
Status: DISCONTINUED | OUTPATIENT
Start: 2021-09-28 | End: 2023-10-23

## 2021-09-28 RX ORDER — METHYLPREDNISOLONE ACETATE 40 MG/ML
20 INJECTION, SUSPENSION INTRA-ARTICULAR; INTRALESIONAL; INTRAMUSCULAR; SOFT TISSUE
Status: DISCONTINUED | OUTPATIENT
Start: 2021-09-28 | End: 2022-09-27

## 2021-09-30 ENCOUNTER — LAB VISIT (OUTPATIENT)
Dept: LAB | Facility: HOSPITAL | Age: 66
End: 2021-09-30
Attending: INTERNAL MEDICINE
Payer: MEDICARE

## 2021-09-30 DIAGNOSIS — E78.00 HYPERCHOLESTEROLEMIA: ICD-10-CM

## 2021-09-30 DIAGNOSIS — Z00.00 ROUTINE CHECK-UP: ICD-10-CM

## 2021-09-30 LAB
ALBUMIN SERPL BCP-MCNC: 4.1 G/DL (ref 3.5–5.2)
ALP SERPL-CCNC: 52 U/L (ref 55–135)
ALT SERPL W/O P-5'-P-CCNC: 21 U/L (ref 10–44)
ANION GAP SERPL CALC-SCNC: 4 MMOL/L (ref 8–16)
AST SERPL-CCNC: 18 U/L (ref 10–40)
BILIRUB SERPL-MCNC: 0.8 MG/DL (ref 0.1–1)
BUN SERPL-MCNC: 21 MG/DL (ref 8–23)
CALCIUM SERPL-MCNC: 10.3 MG/DL (ref 8.7–10.5)
CHLORIDE SERPL-SCNC: 110 MMOL/L (ref 95–110)
CHOLEST SERPL-MCNC: 216 MG/DL (ref 120–199)
CHOLEST/HDLC SERPL: 2.7 {RATIO} (ref 2–5)
CO2 SERPL-SCNC: 27 MMOL/L (ref 23–29)
CREAT SERPL-MCNC: 0.8 MG/DL (ref 0.5–1.4)
EST. GFR  (AFRICAN AMERICAN): >60 ML/MIN/1.73 M^2
EST. GFR  (NON AFRICAN AMERICAN): >60 ML/MIN/1.73 M^2
GLUCOSE SERPL-MCNC: 90 MG/DL (ref 70–110)
HDLC SERPL-MCNC: 81 MG/DL (ref 40–75)
HDLC SERPL: 37.5 % (ref 20–50)
LDLC SERPL CALC-MCNC: 120.2 MG/DL (ref 63–159)
NONHDLC SERPL-MCNC: 135 MG/DL
POTASSIUM SERPL-SCNC: 4.2 MMOL/L (ref 3.5–5.1)
PROT SERPL-MCNC: 7 G/DL (ref 6–8.4)
SODIUM SERPL-SCNC: 141 MMOL/L (ref 136–145)
TRIGL SERPL-MCNC: 74 MG/DL (ref 30–150)

## 2021-09-30 PROCEDURE — 36415 COLL VENOUS BLD VENIPUNCTURE: CPT | Performed by: INTERNAL MEDICINE

## 2021-09-30 PROCEDURE — 80053 COMPREHEN METABOLIC PANEL: CPT | Performed by: INTERNAL MEDICINE

## 2021-09-30 PROCEDURE — 80061 LIPID PANEL: CPT | Performed by: INTERNAL MEDICINE

## 2021-10-06 ENCOUNTER — OFFICE VISIT (OUTPATIENT)
Dept: CARDIOLOGY | Facility: CLINIC | Age: 66
End: 2021-10-06
Payer: MEDICARE

## 2021-10-06 VITALS
SYSTOLIC BLOOD PRESSURE: 128 MMHG | HEIGHT: 67 IN | DIASTOLIC BLOOD PRESSURE: 70 MMHG | HEART RATE: 74 BPM | WEIGHT: 170 LBS | OXYGEN SATURATION: 97 % | BODY MASS INDEX: 26.68 KG/M2

## 2021-10-06 DIAGNOSIS — I49.9 VENTRICULAR ARRHYTHMIA: ICD-10-CM

## 2021-10-06 DIAGNOSIS — R93.1 ELEVATED CORONARY ARTERY CALCIUM SCORE: ICD-10-CM

## 2021-10-06 DIAGNOSIS — J45.30 MILD PERSISTENT ASTHMA WITHOUT COMPLICATION: ICD-10-CM

## 2021-10-06 DIAGNOSIS — E78.00 HYPERCHOLESTEROLEMIA: Primary | ICD-10-CM

## 2021-10-06 PROCEDURE — 3078F PR MOST RECENT DIASTOLIC BLOOD PRESSURE < 80 MM HG: ICD-10-PCS | Mod: CPTII,S$GLB,, | Performed by: INTERNAL MEDICINE

## 2021-10-06 PROCEDURE — 1126F PR PAIN SEVERITY QUANTIFIED, NO PAIN PRESENT: ICD-10-PCS | Mod: CPTII,S$GLB,, | Performed by: INTERNAL MEDICINE

## 2021-10-06 PROCEDURE — 3008F PR BODY MASS INDEX (BMI) DOCUMENTED: ICD-10-PCS | Mod: CPTII,S$GLB,, | Performed by: INTERNAL MEDICINE

## 2021-10-06 PROCEDURE — 1101F PR PT FALLS ASSESS DOC 0-1 FALLS W/OUT INJ PAST YR: ICD-10-PCS | Mod: CPTII,S$GLB,, | Performed by: INTERNAL MEDICINE

## 2021-10-06 PROCEDURE — 3074F PR MOST RECENT SYSTOLIC BLOOD PRESSURE < 130 MM HG: ICD-10-PCS | Mod: CPTII,S$GLB,, | Performed by: INTERNAL MEDICINE

## 2021-10-06 PROCEDURE — 3288F PR FALLS RISK ASSESSMENT DOCUMENTED: ICD-10-PCS | Mod: CPTII,S$GLB,, | Performed by: INTERNAL MEDICINE

## 2021-10-06 PROCEDURE — 99213 OFFICE O/P EST LOW 20 MIN: CPT | Mod: S$GLB,,, | Performed by: INTERNAL MEDICINE

## 2021-10-06 PROCEDURE — 1126F AMNT PAIN NOTED NONE PRSNT: CPT | Mod: CPTII,S$GLB,, | Performed by: INTERNAL MEDICINE

## 2021-10-06 PROCEDURE — 3078F DIAST BP <80 MM HG: CPT | Mod: CPTII,S$GLB,, | Performed by: INTERNAL MEDICINE

## 2021-10-06 PROCEDURE — 3288F FALL RISK ASSESSMENT DOCD: CPT | Mod: CPTII,S$GLB,, | Performed by: INTERNAL MEDICINE

## 2021-10-06 PROCEDURE — 1101F PT FALLS ASSESS-DOCD LE1/YR: CPT | Mod: CPTII,S$GLB,, | Performed by: INTERNAL MEDICINE

## 2021-10-06 PROCEDURE — 3008F BODY MASS INDEX DOCD: CPT | Mod: CPTII,S$GLB,, | Performed by: INTERNAL MEDICINE

## 2021-10-06 PROCEDURE — 99213 PR OFFICE/OUTPT VISIT, EST, LEVL III, 20-29 MIN: ICD-10-PCS | Mod: S$GLB,,, | Performed by: INTERNAL MEDICINE

## 2021-10-06 PROCEDURE — 3074F SYST BP LT 130 MM HG: CPT | Mod: CPTII,S$GLB,, | Performed by: INTERNAL MEDICINE

## 2021-10-24 ENCOUNTER — PATIENT MESSAGE (OUTPATIENT)
Dept: PODIATRY | Facility: CLINIC | Age: 66
End: 2021-10-24
Payer: MEDICARE

## 2021-11-16 ENCOUNTER — TELEPHONE (OUTPATIENT)
Dept: FAMILY MEDICINE | Facility: CLINIC | Age: 66
End: 2021-11-16
Payer: MEDICARE

## 2021-11-18 ENCOUNTER — PATIENT MESSAGE (OUTPATIENT)
Dept: PODIATRY | Facility: CLINIC | Age: 66
End: 2021-11-18
Payer: MEDICARE

## 2021-11-18 ENCOUNTER — OFFICE VISIT (OUTPATIENT)
Dept: FAMILY MEDICINE | Facility: CLINIC | Age: 66
End: 2021-11-18
Payer: MEDICARE

## 2021-11-18 VITALS
SYSTOLIC BLOOD PRESSURE: 128 MMHG | WEIGHT: 168 LBS | BODY MASS INDEX: 26.37 KG/M2 | DIASTOLIC BLOOD PRESSURE: 84 MMHG | HEART RATE: 80 BPM | HEIGHT: 67 IN

## 2021-11-18 DIAGNOSIS — Z12.11 SCREENING FOR MALIGNANT NEOPLASM OF COLON: Primary | ICD-10-CM

## 2021-11-18 DIAGNOSIS — J32.4 PANSINUSITIS, UNSPECIFIED CHRONICITY: ICD-10-CM

## 2021-11-18 PROCEDURE — 1101F PT FALLS ASSESS-DOCD LE1/YR: CPT | Mod: S$GLB,,, | Performed by: PHYSICIAN ASSISTANT

## 2021-11-18 PROCEDURE — 3008F PR BODY MASS INDEX (BMI) DOCUMENTED: ICD-10-PCS | Mod: S$GLB,,, | Performed by: PHYSICIAN ASSISTANT

## 2021-11-18 PROCEDURE — 96372 PR INJECTION,THERAP/PROPH/DIAG2ST, IM OR SUBCUT: ICD-10-PCS | Mod: S$GLB,,, | Performed by: PHYSICIAN ASSISTANT

## 2021-11-18 PROCEDURE — 3008F BODY MASS INDEX DOCD: CPT | Mod: S$GLB,,, | Performed by: PHYSICIAN ASSISTANT

## 2021-11-18 PROCEDURE — 1101F PR PT FALLS ASSESS DOC 0-1 FALLS W/OUT INJ PAST YR: ICD-10-PCS | Mod: S$GLB,,, | Performed by: PHYSICIAN ASSISTANT

## 2021-11-18 PROCEDURE — 99213 OFFICE O/P EST LOW 20 MIN: CPT | Mod: 25,S$GLB,, | Performed by: PHYSICIAN ASSISTANT

## 2021-11-18 PROCEDURE — 1159F MED LIST DOCD IN RCRD: CPT | Mod: S$GLB,,, | Performed by: PHYSICIAN ASSISTANT

## 2021-11-18 PROCEDURE — 3288F PR FALLS RISK ASSESSMENT DOCUMENTED: ICD-10-PCS | Mod: S$GLB,,, | Performed by: PHYSICIAN ASSISTANT

## 2021-11-18 PROCEDURE — 99213 PR OFFICE/OUTPT VISIT, EST, LEVL III, 20-29 MIN: ICD-10-PCS | Mod: 25,S$GLB,, | Performed by: PHYSICIAN ASSISTANT

## 2021-11-18 PROCEDURE — 3288F FALL RISK ASSESSMENT DOCD: CPT | Mod: S$GLB,,, | Performed by: PHYSICIAN ASSISTANT

## 2021-11-18 PROCEDURE — 1159F PR MEDICATION LIST DOCUMENTED IN MEDICAL RECORD: ICD-10-PCS | Mod: S$GLB,,, | Performed by: PHYSICIAN ASSISTANT

## 2021-11-18 PROCEDURE — 96372 THER/PROPH/DIAG INJ SC/IM: CPT | Mod: S$GLB,,, | Performed by: PHYSICIAN ASSISTANT

## 2021-11-18 RX ORDER — DEXAMETHASONE SODIUM PHOSPHATE 4 MG/ML
8 INJECTION, SOLUTION INTRA-ARTICULAR; INTRALESIONAL; INTRAMUSCULAR; INTRAVENOUS; SOFT TISSUE ONCE
Status: COMPLETED | OUTPATIENT
Start: 2021-11-18 | End: 2021-11-18

## 2021-11-18 RX ORDER — CODEINE PHOSPHATE AND GUAIFENESIN 10; 100 MG/5ML; MG/5ML
5 SOLUTION ORAL 3 TIMES DAILY PRN
Qty: 118 ML | Refills: 0 | Status: SHIPPED | OUTPATIENT
Start: 2021-11-18 | End: 2021-11-28

## 2021-11-18 RX ORDER — AMOXICILLIN AND CLAVULANATE POTASSIUM 875; 125 MG/1; MG/1
1 TABLET, FILM COATED ORAL EVERY 12 HOURS
Qty: 20 TABLET | Refills: 0 | Status: SHIPPED | OUTPATIENT
Start: 2021-11-18 | End: 2021-11-28

## 2021-11-18 RX ADMIN — DEXAMETHASONE SODIUM PHOSPHATE 8 MG: 4 INJECTION, SOLUTION INTRA-ARTICULAR; INTRALESIONAL; INTRAMUSCULAR; INTRAVENOUS; SOFT TISSUE at 03:11

## 2021-11-23 ENCOUNTER — OFFICE VISIT (OUTPATIENT)
Dept: PODIATRY | Facility: CLINIC | Age: 66
End: 2021-11-23
Payer: MEDICARE

## 2021-11-23 VITALS
HEART RATE: 78 BPM | BODY MASS INDEX: 26.53 KG/M2 | WEIGHT: 169 LBS | RESPIRATION RATE: 18 BRPM | HEIGHT: 67 IN | OXYGEN SATURATION: 98 % | DIASTOLIC BLOOD PRESSURE: 80 MMHG | SYSTOLIC BLOOD PRESSURE: 124 MMHG

## 2021-11-23 DIAGNOSIS — M79.671 HEEL PAIN, BILATERAL: ICD-10-CM

## 2021-11-23 DIAGNOSIS — G60.9 IDIOPATHIC PERIPHERAL NEUROPATHY: ICD-10-CM

## 2021-11-23 DIAGNOSIS — M72.2 PLANTAR FASCIITIS: Primary | ICD-10-CM

## 2021-11-23 DIAGNOSIS — M79.672 HEEL PAIN, BILATERAL: ICD-10-CM

## 2021-11-23 PROCEDURE — 99213 PR OFFICE/OUTPT VISIT, EST, LEVL III, 20-29 MIN: ICD-10-PCS | Mod: S$GLB,,, | Performed by: PODIATRIST

## 2021-11-23 PROCEDURE — 99213 OFFICE O/P EST LOW 20 MIN: CPT | Mod: S$GLB,,, | Performed by: PODIATRIST

## 2021-11-23 RX ORDER — MELOXICAM 15 MG/1
15 TABLET ORAL
COMMUNITY
Start: 2021-09-28 | End: 2022-04-28

## 2021-12-09 ENCOUNTER — PATIENT MESSAGE (OUTPATIENT)
Dept: UROLOGY | Facility: CLINIC | Age: 66
End: 2021-12-09
Payer: MEDICARE

## 2021-12-09 DIAGNOSIS — N39.0 URINARY TRACT INFECTION WITHOUT HEMATURIA, SITE UNSPECIFIED: Primary | ICD-10-CM

## 2021-12-10 ENCOUNTER — PATIENT MESSAGE (OUTPATIENT)
Dept: UROLOGY | Facility: CLINIC | Age: 66
End: 2021-12-10
Payer: MEDICARE

## 2021-12-14 ENCOUNTER — CLINICAL SUPPORT (OUTPATIENT)
Dept: UROLOGY | Facility: CLINIC | Age: 66
End: 2021-12-14
Payer: MEDICARE

## 2021-12-14 DIAGNOSIS — R30.0 DYSURIA: Primary | ICD-10-CM

## 2021-12-14 PROCEDURE — 87086 URINE CULTURE/COLONY COUNT: CPT | Performed by: NURSE PRACTITIONER

## 2021-12-14 PROCEDURE — 87088 URINE BACTERIA CULTURE: CPT | Performed by: NURSE PRACTITIONER

## 2021-12-14 PROCEDURE — 87186 SC STD MICRODIL/AGAR DIL: CPT | Performed by: NURSE PRACTITIONER

## 2021-12-14 PROCEDURE — 87077 CULTURE AEROBIC IDENTIFY: CPT | Performed by: NURSE PRACTITIONER

## 2021-12-15 ENCOUNTER — PATIENT MESSAGE (OUTPATIENT)
Dept: UROLOGY | Facility: CLINIC | Age: 66
End: 2021-12-15
Payer: MEDICARE

## 2021-12-17 LAB — BACTERIA UR CULT: ABNORMAL

## 2021-12-20 ENCOUNTER — PATIENT MESSAGE (OUTPATIENT)
Dept: UROLOGY | Facility: CLINIC | Age: 66
End: 2021-12-20
Payer: MEDICARE

## 2021-12-20 RX ORDER — FLUCONAZOLE 150 MG/1
150 TABLET ORAL DAILY
Qty: 3 TABLET | Refills: 0 | Status: SHIPPED | OUTPATIENT
Start: 2021-12-20 | End: 2021-12-23

## 2021-12-20 RX ORDER — AMOXICILLIN AND CLAVULANATE POTASSIUM 875; 125 MG/1; MG/1
1 TABLET, FILM COATED ORAL 2 TIMES DAILY
Qty: 20 TABLET | Refills: 0 | Status: SHIPPED | OUTPATIENT
Start: 2021-12-20 | End: 2021-12-30

## 2022-03-24 ENCOUNTER — PATIENT MESSAGE (OUTPATIENT)
Dept: CARDIOLOGY | Facility: CLINIC | Age: 67
End: 2022-03-24
Payer: MEDICARE

## 2022-04-14 DIAGNOSIS — J45.30 MILD PERSISTENT ASTHMA WITHOUT COMPLICATION: ICD-10-CM

## 2022-04-28 ENCOUNTER — OFFICE VISIT (OUTPATIENT)
Dept: PODIATRY | Facility: CLINIC | Age: 67
End: 2022-04-28
Payer: MEDICARE

## 2022-04-28 VITALS — HEIGHT: 67 IN | OXYGEN SATURATION: 98 % | WEIGHT: 169 LBS | BODY MASS INDEX: 26.53 KG/M2 | RESPIRATION RATE: 16 BRPM

## 2022-04-28 DIAGNOSIS — M79.671 HEEL PAIN, BILATERAL: ICD-10-CM

## 2022-04-28 DIAGNOSIS — S96.912D TENDON TEAR, FOOT, LEFT, SUBSEQUENT ENCOUNTER: Primary | ICD-10-CM

## 2022-04-28 DIAGNOSIS — G60.9 IDIOPATHIC PERIPHERAL NEUROPATHY: ICD-10-CM

## 2022-04-28 DIAGNOSIS — M79.672 HEEL PAIN, BILATERAL: ICD-10-CM

## 2022-04-28 DIAGNOSIS — S93.692D RUPTURE OF PLANTAR FASCIA OF LEFT FOOT, SUBSEQUENT ENCOUNTER: ICD-10-CM

## 2022-04-28 DIAGNOSIS — M72.2 PLANTAR FASCIITIS: ICD-10-CM

## 2022-04-28 PROCEDURE — 1160F PR REVIEW ALL MEDS BY PRESCRIBER/CLIN PHARMACIST DOCUMENTED: ICD-10-PCS | Mod: CPTII,S$GLB,, | Performed by: PODIATRIST

## 2022-04-28 PROCEDURE — 99213 OFFICE O/P EST LOW 20 MIN: CPT | Mod: S$GLB,,, | Performed by: PODIATRIST

## 2022-04-28 PROCEDURE — 99213 PR OFFICE/OUTPT VISIT, EST, LEVL III, 20-29 MIN: ICD-10-PCS | Mod: S$GLB,,, | Performed by: PODIATRIST

## 2022-04-28 PROCEDURE — 1160F RVW MEDS BY RX/DR IN RCRD: CPT | Mod: CPTII,S$GLB,, | Performed by: PODIATRIST

## 2022-04-28 PROCEDURE — 1159F MED LIST DOCD IN RCRD: CPT | Mod: CPTII,S$GLB,, | Performed by: PODIATRIST

## 2022-04-28 PROCEDURE — 1125F AMNT PAIN NOTED PAIN PRSNT: CPT | Mod: CPTII,S$GLB,, | Performed by: PODIATRIST

## 2022-04-28 PROCEDURE — 1125F PR PAIN SEVERITY QUANTIFIED, PAIN PRESENT: ICD-10-PCS | Mod: CPTII,S$GLB,, | Performed by: PODIATRIST

## 2022-04-28 PROCEDURE — 1159F PR MEDICATION LIST DOCUMENTED IN MEDICAL RECORD: ICD-10-PCS | Mod: CPTII,S$GLB,, | Performed by: PODIATRIST

## 2022-04-28 PROCEDURE — 3008F PR BODY MASS INDEX (BMI) DOCUMENTED: ICD-10-PCS | Mod: CPTII,S$GLB,, | Performed by: PODIATRIST

## 2022-04-28 PROCEDURE — 3008F BODY MASS INDEX DOCD: CPT | Mod: CPTII,S$GLB,, | Performed by: PODIATRIST

## 2022-04-28 NOTE — PROGRESS NOTES
"  [address] [facility] [address]  [place] [address]  Phone: [phone]   [phone]    Patient's PCP:Domo Gonzalez MD  Referring Provider: No ref. provider found    Subjective:      Chief Complaint:: Follow-up (Bilateral plantar fasciitis worse in left foot)    DOREEN Reeves is a 66 y.o. female who presents today for follow-up on complaint of bilateral plantar fasciitis left foot worse than right.  Current symptoms include sharp pain in heel and lateral dorsal surface.  Aggravating factors are walking weightbearing first steps in the morning an d prolonged use. Symptoms have remained. Treatment to date have included have included running shoe, spenco inserts, mobic, cortisone injections, aleve and physical therapy. Patient is interested in further treatment options.      Vitals:    04/28/22 0823   Resp: 16   SpO2: 98%   Weight: 76.7 kg (169 lb)   Height: 5' 7" (1.702 m)   PainSc:   6      Shoe Size: 9.5    Past Surgical History:   Procedure Laterality Date    APPENDECTOMY      BLADDER SURGERY      CYSTOURETEROSCOPY WITH RETROGRADE PYELOGRAPHY AND INSERTION OF STENT INTO URETER Right 5/5/2021    Procedure: CYSTOURETEROSCOPY, WITH RETROGRADE PYELOGRAM AND URETERAL STENT INSERTION;  Surgeon: Kelsey Winkler MD;  Location: [facility] OR;  Service: Urology;  Laterality: Right;    HYSTERECTOMY      partial hysterectomy    LASER LITHOTRIPSY Right 5/5/2021    Procedure: LITHOTRIPSY, USING LASER;  Surgeon: Kelsey Winkler MD;  Location: [facility] OR;  Service: Urology;  Laterality: Right;    MANDIBLE FRACTURE SURGERY      TONSILLECTOMY      WRIST SURGERY      right wrist, had plate put in     Past Medical History:   Diagnosis Date    Asthma     Cystitis, interstitial     DVT (deep venous thrombosis)     Encounter for blood transfusion     Fx     RIGHT ANKLE    Hyperlipidemia     LVH (left ventricular hypertrophy)     Renal disorder     STONE    Stroke     AGE 20    TMJ (dislocation of " temporomandibular joint)     Urinary tract infection      Family History   Problem Relation Age of Onset    Urolithiasis Father     Prostate cancer Paternal Uncle     Breast cancer Maternal Grandmother     Kidney cancer Neg Hx         Social History:   Marital Status:   Alcohol History:  reports current alcohol use.  Tobacco History:  reports that she has never smoked. She has never used smokeless tobacco.  Drug History:  reports no history of drug use.    Review of patient's allergies indicates:  No Known Allergies    Current Outpatient Medications   Medication Sig Dispense Refill    fluticasone propionate (FLONASE) 50 mcg/actuation nasal spray 1 spray (50 mcg total) by Each Nostril route once daily. 16 g 11    fluticasone-umeclidin-vilanter (TRELEGY ELLIPTA) 100-62.5-25 mcg DsDv Inhale 1 puff into the lungs once daily. 180 each 3    montelukast (SINGULAIR) 10 mg tablet Take 1 tablet (10 mg total) by mouth every evening. 90 tablet 3    MYRBETRIQ 50 mg Tb24 Take 1 tablet (50 mg total) by mouth once daily. 90 tablet 3    rosuvastatin (CRESTOR) 5 MG tablet Take 1 tablet (5 mg total) by mouth once daily. (Patient taking differently: Take 5 mg by mouth every evening. ) 30 tablet 11    spironolactone (ALDACTONE) 100 MG tablet        Current Facility-Administered Medications   Medication Dose Route Frequency Provider Last Rate Last Admin    acetaminophen tablet 650 mg  650 mg Oral Once PRN Denver Herrera PA-C        albuterol inhaler 2 puff  2 puff Inhalation Q20 Min PRN Denver Herrera PA-C        BUPivacaine injection 7.5 mg  1.5 mL Infiltration 1 time in Clinic/HOD Patrice Arredondo DPM        BUPivacaine injection 7.5 mg  1.5 mL Infiltration 1 time in Clinic/HOD Patrice Arredondo DPM        dexamethasone injection 4 mg  4 mg Other 1 time in Clinic/HOD Patrice Arredondo DPM        dexamethasone injection 4 mg  4 mg Other 1 time in Clinic/HOD Patrice Arredondo DPM         methylPREDNISolone acetate injection 20 mg  20 mg Other 1 time in Clinic/HOD Patrice Arrdeondo DPM           Review of Systems   Constitutional: Negative for chills, fatigue, fever and unexpected weight change.   HENT: Negative for hearing loss and trouble swallowing.    Eyes: Negative for photophobia and visual disturbance.   Respiratory: Negative for cough, shortness of breath and wheezing.    Cardiovascular: Negative for chest pain, palpitations and leg swelling.   Gastrointestinal: Negative for abdominal pain and nausea.   Genitourinary: Negative for dysuria and frequency.   Musculoskeletal: Positive for gait problem and myalgias. Negative for arthralgias, back pain and joint swelling.   Skin: Negative for rash and wound.   Neurological: Positive for numbness. Negative for tremors, seizures, weakness and headaches.   Hematological: Does not bruise/bleed easily.         Objective:        Physical Exam:   Foot Exam    General  General Appearance: appears stated age and healthy   Orientation: alert and oriented to person, place, and time   Affect: appropriate   Gait: antalgic       Right Foot/Ankle     Inspection and Palpation  Ecchymosis: none  Tenderness: calcaneus tenderness and plantar fascia (Minimal pain calcaneus at plantar fascial insertion greatly diminished since last visit)  Swelling: none   Arch: normal  Skin Exam: skin intact;   Neurovascular  Dorsalis pedis: 2+  Posterior tibial: 2+  Capillary Refill: 2+  Saphenous nerve sensation: normal  Tibial nerve sensation: normal  Superficial peroneal nerve sensation: normal  Deep peroneal nerve sensation: normal  Sural nerve sensation: normal    Muscle Strength  Ankle dorsiflexion: 5  Ankle plantar flexion: 5  Ankle inversion: 5  Ankle eversion: 5  Great toe extension: 5  Great toe flexion: 5    Comments  Minimal pain on examination of plantar fascia.    Left Foot/Ankle      Inspection and Palpation  Ecchymosis: none  Tenderness: plantar fascia and calcaneus  tenderness (Lateral subtalar joint and peroneal tendons from ankle to 5th metatarsal base.  Moderate pain still present plantar fascial insertion calcaneus)  Swelling: none   Arch: normal  Neurovascular  Dorsalis pedis: 2+  Posterior tibial: 2+  Capillary refill: 2+  Saphenous nerve sensation: normal  Tibial nerve sensation: normal  Superficial peroneal nerve sensation: normal  Deep peroneal nerve sensation: normal  Sural nerve sensation: normal    Muscle Strength  Ankle dorsiflexion: 5  Ankle plantar flexion: 5  Ankle inversion: 5  Ankle eversion: 5  Great toe extension: 5  Great toe flexion: 5    Range of Motion    Normal left ankle ROM  Passive  Ankle inversion: pain  Passive inversion: Pain along peroneal tendon subtalar joint.    Active  Ankle inversion: pain  Left ankle active inversion: Pain along peroneal tendons and subtalar joint.    Comments  Pain on palpation of the infeior medial heel and central plantar heel. No pain present  with side to side compression of the calcaneus. Negative tinnel's sign  at the tarsal tunnel. Negative Raymond's nerve pain. Negative Calcaneal nerve pain. No soft tissue masses. Pain present  with dorsiflexion of the ankle. No edema, erythema, or ecchymosis noted.     Physical Exam  Cardiovascular:      Pulses:           Dorsalis pedis pulses are 2+ on the right side and 2+ on the left side.        Posterior tibial pulses are 2+ on the right side and 2+ on the left side.               Right Ankle/Foot Exam     Comments:  Minimal pain on examination of plantar fascia.    Left Ankle/Foot Exam     Range of Motion   The patient has normal left ankle ROM.     Muscle Strength   The patient has normal left ankle strength.    Comments:  Pain on palpation of the infeior medial heel and central plantar heel. No pain present  with side to side compression of the calcaneus. Negative tinnel's sign  at the tarsal tunnel. Negative Raymond's nerve pain. Negative Calcaneal nerve pain. No soft  tissue masses. Pain present  with dorsiflexion of the ankle. No edema, erythema, or ecchymosis noted.       Muscle Strength   Right Lower Extremity   Ankle Dorsiflexion:  5   Plantar flexion:  5/5  Left Lower Extremity   Ankle Dorsiflexion:  5   Plantar flexion:  5/5     Vascular Exam     Right Pulses  Dorsalis Pedis:      2+  Posterior Tibial:      2+        Left Pulses  Dorsalis Pedis:      2+  Posterior Tibial:      2+             Imaging:            Assessment:       1. Tendon tear, foot, left, subsequent encounter    2. Plantar fasciitis    3. Heel pain, bilateral    4. Idiopathic peripheral neuropathy    5. Rupture of plantar fascia of left foot, subsequent encounter      Plan:   Tendon tear, foot, left, subsequent encounter  -     MRI Foot (Hindfoot) Left Without Contrast; Future; Expected date: 04/28/2022    Plantar fasciitis    Heel pain, bilateral    Idiopathic peripheral neuropathy    Rupture of plantar fascia of left foot, subsequent encounter  -     MRI Foot (Hindfoot) Left Without Contrast; Future; Expected date: 04/28/2022    Evaluated patient today discussed with that she has some findings consistent with either peroneal tendon damage or subtalar joint inflammation that has not responded as well as her plantar fasciitis is not responded on this foot to conservative therapy consisting of immobilization inserts icing anti-inflammatories and physical therapy.  Because of continued severe pain I am ordering a MRI without contrast hindfoot attention plantar fascia and peroneal tendons.  She return to see me to discuss results of MRI and treatment plan.      Procedures          Counseling:     I provided patient education verbally regarding:   Patient diagnosis, treatment options, as well as alternatives, risks, and benefits.       Discussed different treatment options for heel pain. I gave written and verbal instructions on heel cord stretching and this was demonstrated for the patient. Patient expressed  understanding. Discussed wearing appropriate shoe gear and avoiding flats, slippers, sandals, barefoot, and sockfeet. Recommended arch supports. My recommendation for OTC supports is Spenco polysorb replacement insoles or patient may elect more aggressive treatment with prescription arch supports. We also discussed cortisone injections and NSAID therapy.     Treatment of tendonitis with rest, ice, oral NSAID, topical antiinflammatory creams, cam walker boot if needed, and MRI if needed.    I discussed conservative treatment of minor tendon tear with cast for 6 to 8 weeks, MRI, ultrasound if needed for evaluation of the rupture and possible need for surgical repair.    I explained what joint inflammation is and  conservative treatment of off loading the joint with OTC inserts and pads vs custom made orthotics.  The use of ice, heat, oral antiinflammatory and topical antiinflammatory and shoe modification as well as rest of the affected area with decrease walking, standing and non-impact exercising.  This note was created using Dragon voice recognition software that occasionally misinterpreted phrases or words.

## 2022-04-28 NOTE — PATIENT INSTRUCTIONS

## 2022-05-02 ENCOUNTER — PATIENT MESSAGE (OUTPATIENT)
Dept: PODIATRY | Facility: CLINIC | Age: 67
End: 2022-05-02
Payer: MEDICARE

## 2022-05-02 ENCOUNTER — HOSPITAL ENCOUNTER (OUTPATIENT)
Dept: RADIOLOGY | Facility: HOSPITAL | Age: 67
Discharge: HOME | End: 2022-05-02
Attending: PODIATRIST
Payer: MEDICARE

## 2022-05-02 DIAGNOSIS — S93.692D RUPTURE OF PLANTAR FASCIA OF LEFT FOOT, SUBSEQUENT ENCOUNTER: ICD-10-CM

## 2022-05-02 DIAGNOSIS — S96.912D TENDON TEAR, FOOT, LEFT, SUBSEQUENT ENCOUNTER: ICD-10-CM

## 2022-05-02 PROCEDURE — 73718 MRI LOWER EXTREMITY W/O DYE: CPT | Mod: TC,LT

## 2022-05-19 ENCOUNTER — TELEPHONE (OUTPATIENT)
Dept: PODIATRY | Facility: CLINIC | Age: 67
End: 2022-05-19
Payer: MEDICARE

## 2022-05-19 ENCOUNTER — PATIENT MESSAGE (OUTPATIENT)
Dept: PODIATRY | Facility: CLINIC | Age: 67
End: 2022-05-19
Payer: MEDICARE

## 2022-05-19 NOTE — TELEPHONE ENCOUNTER
----- Message from Patrice Arredondo DPM sent at 5/19/2022  7:50 AM CDT -----  Contact patient make appointment to see me to review MRI and discuss treatment options

## 2022-05-24 RX ORDER — MIRABEGRON 50 MG/1
50 TABLET, FILM COATED, EXTENDED RELEASE ORAL DAILY
Qty: 90 TABLET | Refills: 0 | Status: SHIPPED | OUTPATIENT
Start: 2022-05-24 | End: 2022-05-26 | Stop reason: SDUPTHER

## 2022-05-24 NOTE — TELEPHONE ENCOUNTER
Pt's myrbetriq was refilled and will be given enough supply to last them through 1 year from their last appointment. Please see their last note and if they are due for a year f/u from last, go ahead and schedule one year follow up from their last appointment (in addition to any labs or imaging that was ordered).     However, if it was not specified that they should follow up in a year, then they can follow up with their pcp for any further refills and see us prn if they are doing well on their medications.

## 2022-05-26 ENCOUNTER — OFFICE VISIT (OUTPATIENT)
Dept: PODIATRY | Facility: CLINIC | Age: 67
End: 2022-05-26
Payer: MEDICARE

## 2022-05-26 VITALS — HEART RATE: 78 BPM | WEIGHT: 171 LBS | BODY MASS INDEX: 26.84 KG/M2 | OXYGEN SATURATION: 95 % | HEIGHT: 67 IN

## 2022-05-26 DIAGNOSIS — M72.2 PLANTAR FASCIITIS: Primary | ICD-10-CM

## 2022-05-26 PROCEDURE — 3288F PR FALLS RISK ASSESSMENT DOCUMENTED: ICD-10-PCS | Mod: CPTII,S$GLB,, | Performed by: PODIATRIST

## 2022-05-26 PROCEDURE — 99214 PR OFFICE/OUTPT VISIT, EST, LEVL IV, 30-39 MIN: ICD-10-PCS | Mod: S$GLB,,, | Performed by: PODIATRIST

## 2022-05-26 PROCEDURE — 1159F MED LIST DOCD IN RCRD: CPT | Mod: CPTII,S$GLB,, | Performed by: PODIATRIST

## 2022-05-26 PROCEDURE — 1101F PR PT FALLS ASSESS DOC 0-1 FALLS W/OUT INJ PAST YR: ICD-10-PCS | Mod: CPTII,S$GLB,, | Performed by: PODIATRIST

## 2022-05-26 PROCEDURE — 1159F PR MEDICATION LIST DOCUMENTED IN MEDICAL RECORD: ICD-10-PCS | Mod: CPTII,S$GLB,, | Performed by: PODIATRIST

## 2022-05-26 PROCEDURE — 1125F AMNT PAIN NOTED PAIN PRSNT: CPT | Mod: CPTII,S$GLB,, | Performed by: PODIATRIST

## 2022-05-26 PROCEDURE — 3008F PR BODY MASS INDEX (BMI) DOCUMENTED: ICD-10-PCS | Mod: CPTII,S$GLB,, | Performed by: PODIATRIST

## 2022-05-26 PROCEDURE — 1160F RVW MEDS BY RX/DR IN RCRD: CPT | Mod: CPTII,S$GLB,, | Performed by: PODIATRIST

## 2022-05-26 PROCEDURE — 1160F PR REVIEW ALL MEDS BY PRESCRIBER/CLIN PHARMACIST DOCUMENTED: ICD-10-PCS | Mod: CPTII,S$GLB,, | Performed by: PODIATRIST

## 2022-05-26 PROCEDURE — 1125F PR PAIN SEVERITY QUANTIFIED, PAIN PRESENT: ICD-10-PCS | Mod: CPTII,S$GLB,, | Performed by: PODIATRIST

## 2022-05-26 PROCEDURE — 3008F BODY MASS INDEX DOCD: CPT | Mod: CPTII,S$GLB,, | Performed by: PODIATRIST

## 2022-05-26 PROCEDURE — 3288F FALL RISK ASSESSMENT DOCD: CPT | Mod: CPTII,S$GLB,, | Performed by: PODIATRIST

## 2022-05-26 PROCEDURE — 99214 OFFICE O/P EST MOD 30 MIN: CPT | Mod: S$GLB,,, | Performed by: PODIATRIST

## 2022-05-26 PROCEDURE — 1101F PT FALLS ASSESS-DOCD LE1/YR: CPT | Mod: CPTII,S$GLB,, | Performed by: PODIATRIST

## 2022-05-26 RX ORDER — MIRABEGRON 50 MG/1
50 TABLET, FILM COATED, EXTENDED RELEASE ORAL DAILY
Qty: 90 TABLET | Refills: 0 | Status: SHIPPED | OUTPATIENT
Start: 2022-05-26 | End: 2022-12-30 | Stop reason: SDUPTHER

## 2022-05-26 NOTE — PATIENT INSTRUCTIONS

## 2022-05-26 NOTE — PROGRESS NOTES
"  [address] [facility] [address]  [place] [address]  Phone: [phone]   [phone]    Patient's PCP:Domo Gonzalez MD  Referring Provider: No ref. provider found    Subjective:      Chief Complaint:: Plantar Fasciitis (Bilateral worse in the left )    HPI  Eboni Reeves is a 66 y.o. female who presents today for MRI results left foot . Patient states right foot is getting better but the left foot pain is the same as last visit   Vitals:    05/26/22 1558   Pulse: 78   SpO2: 95%   Weight: 77.6 kg (171 lb)   Height: 5' 7" (1.702 m)   PainSc:   5      Shoe Size: 9.5    Past Surgical History:   Procedure Laterality Date    APPENDECTOMY      BLADDER SURGERY      CYSTOURETEROSCOPY WITH RETROGRADE PYELOGRAPHY AND INSERTION OF STENT INTO URETER Right 5/5/2021    Procedure: CYSTOURETEROSCOPY, WITH RETROGRADE PYELOGRAM AND URETERAL STENT INSERTION;  Surgeon: Kelsey Winkler MD;  Location: [facility] OR;  Service: Urology;  Laterality: Right;    HYSTERECTOMY      partial hysterectomy    LASER LITHOTRIPSY Right 5/5/2021    Procedure: LITHOTRIPSY, USING LASER;  Surgeon: Kelsey Winkler MD;  Location: [facility] OR;  Service: Urology;  Laterality: Right;    MANDIBLE FRACTURE SURGERY      TONSILLECTOMY      WRIST SURGERY      right wrist, had plate put in     Past Medical History:   Diagnosis Date    Asthma     Cystitis, interstitial     DVT (deep venous thrombosis)     Encounter for blood transfusion     Fx     RIGHT ANKLE    Hyperlipidemia     LVH (left ventricular hypertrophy)     Renal disorder     STONE    Stroke     AGE 20    TMJ (dislocation of temporomandibular joint)     Urinary tract infection      Family History   Problem Relation Age of Onset    Urolithiasis Father     Prostate cancer Paternal Uncle     Breast cancer Maternal Grandmother     Kidney cancer Neg Hx         Social History:   Marital Status:   Alcohol History:  reports current alcohol use.  Tobacco History:  " reports that she has never smoked. She has never used smokeless tobacco.  Drug History:  reports no history of drug use.    Review of patient's allergies indicates:  No Known Allergies    Current Outpatient Medications   Medication Sig Dispense Refill    fluticasone propionate (FLONASE) 50 mcg/actuation nasal spray 1 spray (50 mcg total) by Each Nostril route once daily. 16 g 11    fluticasone-umeclidin-vilanter (TRELEGY ELLIPTA) 100-62.5-25 mcg DsDv Inhale 1 puff into the lungs once daily. 180 each 3    montelukast (SINGULAIR) 10 mg tablet Take 1 tablet (10 mg total) by mouth every evening. 90 tablet 3    rosuvastatin (CRESTOR) 5 MG tablet TAKE 1 TABLET (5 MG TOTAL) BY MOUTH ONCE DAILY. 30 tablet 2    spironolactone (ALDACTONE) 100 MG tablet       MYRBETRIQ 50 mg Tb24 Take 1 tablet (50 mg total) by mouth once daily. 90 tablet 0     Current Facility-Administered Medications   Medication Dose Route Frequency Provider Last Rate Last Admin    acetaminophen tablet 650 mg  650 mg Oral Once PRN Denver Herrera PA-C        albuterol inhaler 2 puff  2 puff Inhalation Q20 Min PRN Denver Herrera PA-C        BUPivacaine injection 7.5 mg  1.5 mL Infiltration 1 time in Clinic/[facility] Patrice Arredondo DPM        BUPivacaine injection 7.5 mg  1.5 mL Infiltration 1 time in Clinic/[facility] Patrice Arredondo DPM        dexamethasone injection 4 mg  4 mg Other 1 time in Clinic/[facility] Patrice Arredondo DPM        dexamethasone injection 4 mg  4 mg Other 1 time in Clinic/[facility] Patrice Arredondo DPM        methylPREDNISolone acetate injection 20 mg  20 mg Other 1 time in Clinic/[facility] Patrice Arredondo DPM           Review of Systems   Constitutional: Negative for chills, fatigue, fever and unexpected weight change.   HENT: Negative for hearing loss and trouble swallowing.    Eyes: Negative for photophobia and visual disturbance.   Respiratory: Negative for cough, shortness of breath and wheezing.    Cardiovascular:  Negative for chest pain, palpitations and leg swelling.   Gastrointestinal: Negative for abdominal pain and nausea.   Genitourinary: Negative for dysuria and frequency.   Musculoskeletal: Negative for arthralgias, back pain, gait problem, joint swelling and myalgias.   Skin: Negative for rash and wound.   Neurological: Negative for seizures, weakness, numbness and headaches.   Hematological: Does not bruise/bleed easily.         Objective:        Physical Exam:   Foot Exam    General  General Appearance: appears stated age and healthy   Orientation: alert and oriented to person, place, and time   Affect: appropriate   Gait: antalgic       Left Foot/Ankle      Inspection and Palpation  Ecchymosis: none  Tenderness: plantar fascia and calcaneus tenderness   Swelling: none   Arch: normal  Skin Exam: skin intact;   Neurovascular  Dorsalis pedis: 2+  Posterior tibial: 2+  Capillary refill: 2+  Saphenous nerve sensation: normal  Tibial nerve sensation: normal  Superficial peroneal nerve sensation: normal  Deep peroneal nerve sensation: normal  Sural nerve sensation: normal    Muscle Strength  Ankle dorsiflexion: 5  Ankle plantar flexion: 5  Ankle inversion: 5  Ankle eversion: 5  Great toe extension: 5  Great toe flexion: 5    Range of Motion    Normal left ankle ROM    Tests  Calcaneal squeeze: negative   PT Tinel's sign: negative  Paresthesia: negative  Comments  Pain on palpation of the infeior medial heel and central plantar heel. No pain present  with side to side compression of the calcaneus. Negative tinnel's sign  at the tarsal tunnel. Negative Raymond's nerve pain. Negative Calcaneal nerve pain. No soft tissue masses. Pain present with dorsiflexion of the ankle. No edema, erythema, or ecchymosis noted.     Physical Exam  Cardiovascular:      Pulses:           Dorsalis pedis pulses are 2+ on the left side.        Posterior tibial pulses are 2+ on the left side.               Left Ankle/Foot Exam     Range of Motion    The patient has normal left ankle ROM.     Muscle Strength   The patient has normal left ankle strength.    Comments:  Pain on palpation of the infeior medial heel and central plantar heel. No pain present  with side to side compression of the calcaneus. Negative tinnel's sign  at the tarsal tunnel. Negative Raymond's nerve pain. Negative Calcaneal nerve pain. No soft tissue masses. Pain present with dorsiflexion of the ankle. No edema, erythema, or ecchymosis noted.       Muscle Strength   Left Lower Extremity   Ankle Dorsiflexion:  5   Plantar flexion:  5/5     Vascular Exam       Left Pulses  Dorsalis Pedis:      2+  Posterior Tibial:      2+             Imaging:            Assessment:       1. Plantar fasciitis - Left Foot      Plan:   Plantar fasciitis - Left Foot    I re-evaluate the patient today described with her the clinical and reviewed the MRI findings of chronic plantar fasciitis left foot.  We discussed continued conservative care consisting of running shoes inserts icing anti-inflammatories as needed.  I also discussed with her today endoscopic plantar fasciotomy since she has been having pain for so long that has not resolved.  If she does not get adequate relief with in 3 months she says she will call to schedule for the endoscopic plantar fasciotomy.      Patient was educated about the entire pre, qasim and post-operative time period.  They  were educated about the pro's and con's of surgery.  All conservative measures have been exhausted. Patient understands the particular risks involved which may occur in connection with the procedure proposed including pain, swelling, infection, stiffness, decreased ROM, recurrence, rejection, numbness, delayed healing, scar formation.    Patient was educated that their diagnosis may be surgically treated.  The patient's problem will probably advance and usually will not get better without surgery.  All of the pre-operative treatment plans have been exhausted or  will no longer be successful at this point in time.  Patient was told of the possible outcomes and expectations of the surgical procedure.  They  will need to be followed-up post-operatively.  Today, pictures were drawn, questions answered.      We discussed the following surgical procedures:  Endoscopic plantar fasciotomy left foot.  If she decides have surgery she will contact my nurse and schedule.      Procedures          Counseling:     I provided patient education verbally regarding:   Patient diagnosis, treatment options, as well as alternatives, risks, and benefits.     This note was created using Dragon voice recognition software that occasionally misinterpreted phrases or words.

## 2022-06-03 ENCOUNTER — OFFICE VISIT (OUTPATIENT)
Dept: UROLOGY | Facility: CLINIC | Age: 67
End: 2022-06-03
Payer: MEDICARE

## 2022-06-03 VITALS
DIASTOLIC BLOOD PRESSURE: 65 MMHG | RESPIRATION RATE: 18 BRPM | WEIGHT: 163 LBS | BODY MASS INDEX: 25.58 KG/M2 | HEIGHT: 67 IN | SYSTOLIC BLOOD PRESSURE: 113 MMHG | HEART RATE: 66 BPM

## 2022-06-03 DIAGNOSIS — N32.81 OAB (OVERACTIVE BLADDER): Primary | ICD-10-CM

## 2022-06-03 DIAGNOSIS — R39.9 UTI SYMPTOMS: ICD-10-CM

## 2022-06-03 DIAGNOSIS — N30.10 IC (INTERSTITIAL CYSTITIS): ICD-10-CM

## 2022-06-03 PROCEDURE — 99999 PR PBB SHADOW E&M-EST. PATIENT-LVL IV: ICD-10-PCS | Mod: PBBFAC,,, | Performed by: NURSE PRACTITIONER

## 2022-06-03 PROCEDURE — 87077 CULTURE AEROBIC IDENTIFY: CPT | Performed by: NURSE PRACTITIONER

## 2022-06-03 PROCEDURE — 1160F PR REVIEW ALL MEDS BY PRESCRIBER/CLIN PHARMACIST DOCUMENTED: ICD-10-PCS | Mod: CPTII,S$GLB,, | Performed by: NURSE PRACTITIONER

## 2022-06-03 PROCEDURE — 87086 URINE CULTURE/COLONY COUNT: CPT | Performed by: NURSE PRACTITIONER

## 2022-06-03 PROCEDURE — 3288F FALL RISK ASSESSMENT DOCD: CPT | Mod: CPTII,S$GLB,, | Performed by: NURSE PRACTITIONER

## 2022-06-03 PROCEDURE — 51701 INSERT,NON-INDWELLING BLADDER CATHETER: ICD-10-PCS | Mod: S$GLB,,, | Performed by: NURSE PRACTITIONER

## 2022-06-03 PROCEDURE — 1160F RVW MEDS BY RX/DR IN RCRD: CPT | Mod: CPTII,S$GLB,, | Performed by: NURSE PRACTITIONER

## 2022-06-03 PROCEDURE — 3078F PR MOST RECENT DIASTOLIC BLOOD PRESSURE < 80 MM HG: ICD-10-PCS | Mod: CPTII,S$GLB,, | Performed by: NURSE PRACTITIONER

## 2022-06-03 PROCEDURE — 3008F BODY MASS INDEX DOCD: CPT | Mod: CPTII,S$GLB,, | Performed by: NURSE PRACTITIONER

## 2022-06-03 PROCEDURE — 3074F PR MOST RECENT SYSTOLIC BLOOD PRESSURE < 130 MM HG: ICD-10-PCS | Mod: CPTII,S$GLB,, | Performed by: NURSE PRACTITIONER

## 2022-06-03 PROCEDURE — 99214 OFFICE O/P EST MOD 30 MIN: CPT | Mod: 25,S$GLB,, | Performed by: NURSE PRACTITIONER

## 2022-06-03 PROCEDURE — 1125F PR PAIN SEVERITY QUANTIFIED, PAIN PRESENT: ICD-10-PCS | Mod: CPTII,S$GLB,, | Performed by: NURSE PRACTITIONER

## 2022-06-03 PROCEDURE — 51701 INSERT BLADDER CATHETER: CPT | Mod: S$GLB,,, | Performed by: NURSE PRACTITIONER

## 2022-06-03 PROCEDURE — 99999 PR PBB SHADOW E&M-EST. PATIENT-LVL IV: CPT | Mod: PBBFAC,,, | Performed by: NURSE PRACTITIONER

## 2022-06-03 PROCEDURE — 1159F PR MEDICATION LIST DOCUMENTED IN MEDICAL RECORD: ICD-10-PCS | Mod: CPTII,S$GLB,, | Performed by: NURSE PRACTITIONER

## 2022-06-03 PROCEDURE — 99214 PR OFFICE/OUTPT VISIT, EST, LEVL IV, 30-39 MIN: ICD-10-PCS | Mod: 25,S$GLB,, | Performed by: NURSE PRACTITIONER

## 2022-06-03 PROCEDURE — 3008F PR BODY MASS INDEX (BMI) DOCUMENTED: ICD-10-PCS | Mod: CPTII,S$GLB,, | Performed by: NURSE PRACTITIONER

## 2022-06-03 PROCEDURE — 1159F MED LIST DOCD IN RCRD: CPT | Mod: CPTII,S$GLB,, | Performed by: NURSE PRACTITIONER

## 2022-06-03 PROCEDURE — 3078F DIAST BP <80 MM HG: CPT | Mod: CPTII,S$GLB,, | Performed by: NURSE PRACTITIONER

## 2022-06-03 PROCEDURE — 3288F PR FALLS RISK ASSESSMENT DOCUMENTED: ICD-10-PCS | Mod: CPTII,S$GLB,, | Performed by: NURSE PRACTITIONER

## 2022-06-03 PROCEDURE — 87186 SC STD MICRODIL/AGAR DIL: CPT | Performed by: NURSE PRACTITIONER

## 2022-06-03 PROCEDURE — 3074F SYST BP LT 130 MM HG: CPT | Mod: CPTII,S$GLB,, | Performed by: NURSE PRACTITIONER

## 2022-06-03 PROCEDURE — 1101F PT FALLS ASSESS-DOCD LE1/YR: CPT | Mod: CPTII,S$GLB,, | Performed by: NURSE PRACTITIONER

## 2022-06-03 PROCEDURE — 1101F PR PT FALLS ASSESS DOC 0-1 FALLS W/OUT INJ PAST YR: ICD-10-PCS | Mod: CPTII,S$GLB,, | Performed by: NURSE PRACTITIONER

## 2022-06-03 PROCEDURE — 1125F AMNT PAIN NOTED PAIN PRSNT: CPT | Mod: CPTII,S$GLB,, | Performed by: NURSE PRACTITIONER

## 2022-06-03 PROCEDURE — 87088 URINE BACTERIA CULTURE: CPT | Performed by: NURSE PRACTITIONER

## 2022-06-03 RX ORDER — AMOXICILLIN AND CLAVULANATE POTASSIUM 875; 125 MG/1; MG/1
1 TABLET, FILM COATED ORAL 2 TIMES DAILY
Qty: 14 TABLET | Refills: 0 | Status: SHIPPED | OUTPATIENT
Start: 2022-06-03 | End: 2022-06-10

## 2022-06-03 RX ORDER — FLUCONAZOLE 150 MG/1
150 TABLET ORAL DAILY
Qty: 3 TABLET | Refills: 0 | Status: SHIPPED | OUTPATIENT
Start: 2022-06-03 | End: 2022-06-06

## 2022-06-03 NOTE — PROGRESS NOTES
[facility] Note  Staff: VIRGEN ByrdC    PCP: PRASHANT Gonzalez  Urologist:  PRASHANT Winkler    Chief Complaint: UTI symptoms    Subjective:        HPI: Eboni Reeves is a 66 y.o. female presents today with c/o UTI symptoms at this time, therefore in clinic today for workup.  Pt with hx of UTIs, nocturnal urgency, kidney stones.    She has a history of nocturnal urgency managed with Myrbetriq 50 mg which she has been on nightly for the past few years.  She has to wake up 10 times and now wakes up 3-4 times.  Only has daytime frequency of 3-4.     She has a history of interstitial cystitis because of symptoms of UTI with negative cultures in the past.  Not on any Estrace or UTIva, she has used azo in the past with success for IC flare.      She was eventually found to have a right proximal ureteral stone when she had a workup for gross hematuria recently.  She underwent cystoscopy and ureteroscopy on 05/05/2021.  The stone had migrated distally.  This was her 1st stone episode.     Urine history:  5/13/21            Mod bld/small wbc  5/12/21            Cath: 2+bld/tr leuk, void: 1+leuk/3+bld/2+prot   5/3/21              Cath: 3+bld, void:            4/19/2021       ENTEROCOCCUS FAECALIS (A) , void: mod blood/tr leuk, cyt: neg   3/16/2020       No growth, void: neg    3/11/2020      2:01 PM Multiple organisms isolated. None in predominance.  Repeat if, void: 2+leuk   11/8/2019       STREPTOCOCCUS AGALACTIAE (GROUP B) (A) , void: 2+leuk   2/15/2017       ESCHERICHIA COLI . . .   10/10/2016       No growth   2/17/2016       No growth         REVIEW OF SYSTEMS:  A comprehensive 10 system review was performed and is negative except as noted above in HPI    PMHx:  Past Medical History:   Diagnosis Date    Asthma     Cystitis, interstitial     DVT (deep venous thrombosis)     Encounter for blood transfusion     Fx     RIGHT ANKLE    Hyperlipidemia     LVH (left ventricular hypertrophy)      Renal disorder     STONE    Stroke     AGE 20    TMJ (dislocation of temporomandibular joint)     Urinary tract infection      PSHx:  Past Surgical History:   Procedure Laterality Date    APPENDECTOMY      BLADDER SURGERY      CYSTOURETEROSCOPY WITH RETROGRADE PYELOGRAPHY AND INSERTION OF STENT INTO URETER Right 5/5/2021    Procedure: CYSTOURETEROSCOPY, WITH RETROGRADE PYELOGRAM AND URETERAL STENT INSERTION;  Surgeon: Kelsey Winkler MD;  Location: [facility] OR;  Service: Urology;  Laterality: Right;    HYSTERECTOMY      partial hysterectomy    LASER LITHOTRIPSY Right 5/5/2021    Procedure: LITHOTRIPSY, USING LASER;  Surgeon: Kelsey Winkler MD;  Location: [facility] OR;  Service: Urology;  Laterality: Right;    MANDIBLE FRACTURE SURGERY      TONSILLECTOMY      WRIST SURGERY      right wrist, had plate put in     Allergies:  Patient has no known allergies.    Medications: reviewed   Objective:     Vitals:    06/03/22 0758   BP: 113/65   Pulse: 66   Resp: 18     Physical Exam  Vitals reviewed.   Constitutional:       Appearance: She is well-developed.   HENT:      Head: Normocephalic and atraumatic.   Eyes:      Conjunctiva/sclera: Conjunctivae normal.      Pupils: Pupils are equal, round, and reactive to light.   Cardiovascular:      Rate and Rhythm: Normal rate and regular rhythm.      Heart sounds: Normal heart sounds.   Pulmonary:      Effort: Pulmonary effort is normal.      Breath sounds: Normal breath sounds.   Abdominal:      General: Bowel sounds are normal.      Palpations: Abdomen is soft.   Musculoskeletal:         General: Normal range of motion.      Cervical back: Normal range of motion and neck supple.   Skin:     General: Skin is warm and dry.   Neurological:      Mental Status: She is alert and oriented to person, place, and time.      Deep Tendon Reflexes: Reflexes are normal and symmetric.   Psychiatric:         Behavior: Behavior normal.         Thought Content: Thought  content normal.         Judgment: Judgment normal.      EXAM performed by me in office today:  External Genitalia: normal hair distribution, no lesions  Urethral meatus: normal without prolapse or caruncle  Urethra: without tenderness or mass  Bladder: without fullness or tenderness  In and out cath performed by me with 150 mL of pyridium stained urine residual obtained    Assessment:       1. OAB (overactive bladder)    2. IC (interstitial cystitis)    3. UTI symptoms          Plan:     1. Urine culture from cath'd specimen to be processed today.  2. In the meantime, we will prescribe her Augmentin BID and Diflucan for possible UTI.  3. Discussed conservative measures to control urgency and frequency including avoiding bladder irritants, timed voiding, not postponing voiding, and bowel regimen (as distended bowel has extrinsic compressive effect on bladder. Discussed bladder irritants include coffe (even decaf), tea, alcohol, soda, spicy foods, acidic juices (orange, tomato), vinegar, and artificial sweeteners.  4. Continue Myrbetriq 50 mg daily as previously prescribed by MD.    F/u-Our office will contact this pt after we receive and review ALL urine results to determine best POC for this patient.  Pt verbalized understanding at conclusion of appointment today.  Pt was instructed to contact our office should their symptoms worsen or any new  complaints.    [facility]: Active    Monalisa Marin, ANN

## 2022-06-06 ENCOUNTER — TELEPHONE (OUTPATIENT)
Dept: UROLOGY | Facility: CLINIC | Age: 67
End: 2022-06-06
Payer: MEDICARE

## 2022-06-06 LAB — BACTERIA UR CULT: ABNORMAL

## 2022-06-06 NOTE — TELEPHONE ENCOUNTER
----- Message from BEATRIZ Holden sent at 6/6/2022  8:00 AM CDT -----  Please let pt know their urine culture showed +UTI at this time.  Last appt we prescribed to her Augmentin which has Ampicllin in the medication.  Therefore she can continue the Augmentin and then schedule her in 3-4 weeks on Nurse Visit to turn in another urine sample to repeat culture for clearance.

## 2022-06-06 NOTE — TELEPHONE ENCOUNTER
Call placed to give urine culture results and advisement, no answer, message left with call back number.

## 2022-06-14 DIAGNOSIS — R09.89 CHRONIC SINUS COMPLAINTS: ICD-10-CM

## 2022-06-14 RX ORDER — FLUTICASONE PROPIONATE 50 MCG
1 SPRAY, SUSPENSION (ML) NASAL DAILY
Qty: 16 G | Refills: 11 | Status: SHIPPED | OUTPATIENT
Start: 2022-06-14 | End: 2023-09-15 | Stop reason: SDUPTHER

## 2022-07-20 ENCOUNTER — TELEPHONE (OUTPATIENT)
Dept: FAMILY MEDICINE | Facility: CLINIC | Age: 67
End: 2022-07-20

## 2022-07-20 ENCOUNTER — OFFICE VISIT (OUTPATIENT)
Dept: FAMILY MEDICINE | Facility: CLINIC | Age: 67
End: 2022-07-20
Payer: MEDICARE

## 2022-07-20 VITALS
DIASTOLIC BLOOD PRESSURE: 80 MMHG | HEART RATE: 88 BPM | WEIGHT: 175 LBS | HEIGHT: 67 IN | TEMPERATURE: 99 F | SYSTOLIC BLOOD PRESSURE: 138 MMHG | BODY MASS INDEX: 27.47 KG/M2

## 2022-07-20 DIAGNOSIS — L30.4 INTERTRIGO: ICD-10-CM

## 2022-07-20 DIAGNOSIS — H61.21 IMPACTED CERUMEN OF RIGHT EAR: ICD-10-CM

## 2022-07-20 DIAGNOSIS — H65.00 ACUTE SEROUS OTITIS MEDIA, RECURRENCE NOT SPECIFIED, UNSPECIFIED LATERALITY: Primary | ICD-10-CM

## 2022-07-20 PROCEDURE — 1159F PR MEDICATION LIST DOCUMENTED IN MEDICAL RECORD: ICD-10-PCS | Mod: CPTII,S$GLB,, | Performed by: PHYSICIAN ASSISTANT

## 2022-07-20 PROCEDURE — 1101F PR PT FALLS ASSESS DOC 0-1 FALLS W/OUT INJ PAST YR: ICD-10-PCS | Mod: CPTII,S$GLB,, | Performed by: PHYSICIAN ASSISTANT

## 2022-07-20 PROCEDURE — 3075F PR MOST RECENT SYSTOLIC BLOOD PRESS GE 130-139MM HG: ICD-10-PCS | Mod: CPTII,S$GLB,, | Performed by: PHYSICIAN ASSISTANT

## 2022-07-20 PROCEDURE — 3288F PR FALLS RISK ASSESSMENT DOCUMENTED: ICD-10-PCS | Mod: CPTII,S$GLB,, | Performed by: PHYSICIAN ASSISTANT

## 2022-07-20 PROCEDURE — 1101F PT FALLS ASSESS-DOCD LE1/YR: CPT | Mod: CPTII,S$GLB,, | Performed by: PHYSICIAN ASSISTANT

## 2022-07-20 PROCEDURE — 3079F DIAST BP 80-89 MM HG: CPT | Mod: CPTII,S$GLB,, | Performed by: PHYSICIAN ASSISTANT

## 2022-07-20 PROCEDURE — 1160F RVW MEDS BY RX/DR IN RCRD: CPT | Mod: CPTII,S$GLB,, | Performed by: PHYSICIAN ASSISTANT

## 2022-07-20 PROCEDURE — 99213 OFFICE O/P EST LOW 20 MIN: CPT | Mod: S$GLB,,, | Performed by: PHYSICIAN ASSISTANT

## 2022-07-20 PROCEDURE — 3008F BODY MASS INDEX DOCD: CPT | Mod: CPTII,S$GLB,, | Performed by: PHYSICIAN ASSISTANT

## 2022-07-20 PROCEDURE — 99213 PR OFFICE/OUTPT VISIT, EST, LEVL III, 20-29 MIN: ICD-10-PCS | Mod: S$GLB,,, | Performed by: PHYSICIAN ASSISTANT

## 2022-07-20 PROCEDURE — 3288F FALL RISK ASSESSMENT DOCD: CPT | Mod: CPTII,S$GLB,, | Performed by: PHYSICIAN ASSISTANT

## 2022-07-20 PROCEDURE — 1160F PR REVIEW ALL MEDS BY PRESCRIBER/CLIN PHARMACIST DOCUMENTED: ICD-10-PCS | Mod: CPTII,S$GLB,, | Performed by: PHYSICIAN ASSISTANT

## 2022-07-20 PROCEDURE — 3008F PR BODY MASS INDEX (BMI) DOCUMENTED: ICD-10-PCS | Mod: CPTII,S$GLB,, | Performed by: PHYSICIAN ASSISTANT

## 2022-07-20 PROCEDURE — 3075F SYST BP GE 130 - 139MM HG: CPT | Mod: CPTII,S$GLB,, | Performed by: PHYSICIAN ASSISTANT

## 2022-07-20 PROCEDURE — 1159F MED LIST DOCD IN RCRD: CPT | Mod: CPTII,S$GLB,, | Performed by: PHYSICIAN ASSISTANT

## 2022-07-20 PROCEDURE — 3079F PR MOST RECENT DIASTOLIC BLOOD PRESSURE 80-89 MM HG: ICD-10-PCS | Mod: CPTII,S$GLB,, | Performed by: PHYSICIAN ASSISTANT

## 2022-07-20 RX ORDER — PREDNISONE 5 MG/1
5 TABLET ORAL DAILY
Qty: 5 TABLET | Refills: 0 | Status: SHIPPED | OUTPATIENT
Start: 2022-07-20 | End: 2022-07-25

## 2022-07-20 RX ORDER — CLOTRIMAZOLE AND BETAMETHASONE DIPROPIONATE 10; .64 MG/G; MG/G
CREAM TOPICAL 2 TIMES DAILY
Qty: 45 G | Refills: 0 | Status: SHIPPED | OUTPATIENT
Start: 2022-07-20 | End: 2022-07-27

## 2022-07-20 NOTE — TELEPHONE ENCOUNTER
----- Message from Hadley Lazo MA sent at 7/20/2022  2:24 PM CDT -----  Pt is calling for an appt for her ears and they are really clogged up. # [phone]

## 2022-07-20 NOTE — PROGRESS NOTES
"  SUBJECTIVE:    Patient ID: Eboni Reeves is a 67 y.o. female.    Chief Complaint: Ear Fullness (Of both side for one week // no bottles // Mammogram ordered //abc)    Pt is a 67 y.o. female who presents today for a sick visit with 2 CC's.  The first is "ear fullness and decreased hearing" that started gradually 1 week ago. The symptoms are present bilaterally, but tend to be worse in the left ear.  She describes a pressure-like sensation that is constantly present bilaterally.  Nothing tends to exacerbate the symptoms.  In attempt to alleviate them, she has been using Q-tips for ear hygiene, but is not experience any significant relief.  She denies any ear pain, tinnitus, ear discharge, or known foreign bodies lodged in the ear canals.    Her second CC is "a rash" located along the bra line, and below both breasts.  It is red and itchy in nature.  The rash is constantly present and exacerbated with heat or when sweating.  In an attempt to alleviate this rash, she has been applying OTC hydrocortisone cream, but has not experience significant relief.    Office Visit on 06/03/2022   Component Date Value Ref Range Status    Urine Culture, Routine 06/03/2022  (A)  Final                    Value:ENTEROCOCCUS FAECALIS  50,000 - 99,999 cfu/ml         Past Medical History:   Diagnosis Date    Asthma     Cystitis, interstitial     DVT (deep venous thrombosis)     Encounter for blood transfusion     Fx     RIGHT ANKLE    Hyperlipidemia     LVH (left ventricular hypertrophy)     Renal disorder     STONE    Stroke     AGE 20    TMJ (dislocation of temporomandibular joint)     Urinary tract infection      Past Surgical History:   Procedure Laterality Date    APPENDECTOMY      BLADDER SURGERY      CYSTOURETEROSCOPY WITH RETROGRADE PYELOGRAPHY AND INSERTION OF STENT INTO URETER Right 5/5/2021    Procedure: CYSTOURETEROSCOPY, WITH RETROGRADE PYELOGRAM AND URETERAL STENT INSERTION;  Surgeon: Kelsey Winkler, " MD;  Location: [facility] OR;  Service: Urology;  Laterality: Right;    HYSTERECTOMY      partial hysterectomy    LASER LITHOTRIPSY Right 5/5/2021    Procedure: LITHOTRIPSY, USING LASER;  Surgeon: Kelsey Winkler MD;  Location: [facility] OR;  Service: Urology;  Laterality: Right;    MANDIBLE FRACTURE SURGERY      TONSILLECTOMY      WRIST SURGERY      right wrist, had plate put in     Family History   Problem Relation Age of Onset    Urolithiasis Father     Prostate cancer Paternal Uncle     Breast cancer Maternal Grandmother     Kidney cancer Neg Hx        Marital Status:   Alcohol History:  reports current alcohol use.  Tobacco History:  reports that she has never smoked. She has never used smokeless tobacco.  Drug History:  reports no history of drug use.    Health Maintenance Topics with due status: Not Due       Topic Last Completion Date    Colorectal Cancer Screening 01/12/2018    DEXA Scan 06/07/2021    Lipid Panel 09/30/2021    Influenza Vaccine Not Due       There is no immunization history on file for this patient.    Review of patient's allergies indicates:  No Known Allergies    Current Outpatient Medications:     fluticasone propionate (FLONASE) 50 mcg/actuation nasal spray, 1 spray (50 mcg total) by Each Nostril route once daily., Disp: 16 g, Rfl: 11    fluticasone-umeclidin-vilanter (TRELEGY ELLIPTA) 100-62.5-25 mcg DsDv, Inhale 1 puff into the lungs once daily., Disp: 180 each, Rfl: 3    montelukast (SINGULAIR) 10 mg tablet, Take 1 tablet (10 mg total) by mouth every evening., Disp: 90 tablet, Rfl: 3    MYRBETRIQ 50 mg Tb24, Take 1 tablet (50 mg total) by mouth once daily., Disp: 90 tablet, Rfl: 0    rosuvastatin (CRESTOR) 5 MG tablet, TAKE 1 TABLET (5 MG TOTAL) BY MOUTH ONCE DAILY., Disp: 30 tablet, Rfl: 2    spironolactone (ALDACTONE) 100 MG tablet, , Disp: , Rfl:     clotrimazole-betamethasone 1-0.05% (LOTRISONE) cream, Apply topically 2 (two) times daily. for 7 days, Disp: 45  "g, Rfl: 0    predniSONE (DELTASONE) 5 MG tablet, Take 1 tablet (5 mg total) by mouth once daily. for 5 days, Disp: 5 tablet, Rfl: 0    Current Facility-Administered Medications:     acetaminophen tablet 650 mg, 650 mg, Oral, Once PRN, Denver Herrera PA-C    albuterol inhaler 2 puff, 2 puff, Inhalation, Q20 Min PRN, Denver Herrera PA-C    BUPivacaine injection 7.5 mg, 1.5 mL, Infiltration, 1 time in Clinic[facility], Patrice Arredondo DPM    BUPivacaine injection 7.5 mg, 1.5 mL, Infiltration, 1 time in Clinic/[facility], Patrice Arredondo DPM    dexamethasone injection 4 mg, 4 mg, Other, 1 time in [facility], Patrice Arredondo DPM    dexamethasone injection 4 mg, 4 mg, Other, 1 time in Clinic/[facility], Patrice Arredondo DPM    methylPREDNISolone acetate injection 20 mg, 20 mg, Other, 1 time in Clinic/[facility], Patrice Arredondo DPM    Review of Systems   Constitutional: Negative for chills and fever.   HENT: Negative for congestion, ear discharge, ear pain, postnasal drip, rhinorrhea, sinus pressure, sinus pain, sore throat and tinnitus.         "Bilateral ear pressure/fullness. Left worse than right."   Respiratory: Negative for cough, shortness of breath and wheezing.    Cardiovascular: Negative for chest pain and palpitations.   Gastrointestinal: Negative for abdominal pain, constipation, diarrhea, nausea and vomiting.   Genitourinary: Negative for difficulty urinating, dysuria and hematuria.   Musculoskeletal: Negative for arthralgias and myalgias.   Skin: Positive for rash ("Under the breast.).   Neurological: Negative for dizziness, syncope and light-headedness.          Objective:      Vitals:    07/20/22 1523   BP: 138/80   Pulse: 88   Temp: 98.5 °F (36.9 °C)   Weight: 79.4 kg (175 lb)   Height: 5' 7" (1.702 m)     Physical Exam  Vitals and nursing note reviewed.   Constitutional:       General: She is not in acute distress.     Appearance: Normal appearance. She is normal weight. She is not " ill-appearing, toxic-appearing or diaphoretic.   HENT:      Head: Normocephalic and atraumatic.      Right Ear: External ear normal. There is impacted cerumen.      Left Ear: Ear canal and external ear normal. There is no impacted cerumen.      Ears:      Comments: Serous effusion behind the left TM.     Nose: No congestion or rhinorrhea.      Mouth/Throat:      Mouth: Mucous membranes are moist.      Pharynx: Oropharynx is clear.   Eyes:      General: No scleral icterus.     Conjunctiva/sclera: Conjunctivae normal.   Neck:      Vascular: No carotid bruit.   Cardiovascular:      Rate and Rhythm: Normal rate and regular rhythm.      Pulses: Normal pulses.      Heart sounds: Normal heart sounds. No murmur heard.    No friction rub. No gallop.   Pulmonary:      Effort: Pulmonary effort is normal.      Breath sounds: Normal breath sounds. No wheezing, rhonchi or rales.   Chest:       Abdominal:      Tenderness: There is no abdominal tenderness. There is no guarding or rebound.   Skin:     General: Skin is warm and dry.      Findings: Rash present.   Neurological:      Mental Status: She is alert. Mental status is at baseline.   Psychiatric:         Behavior: Behavior is cooperative.           Assessment:       1. Acute serous otitis media, recurrence not specified, unspecified laterality    2. Intertrigo    3. Impacted cerumen of right ear           Plan:       Acute serous otitis media, recurrence not specified, unspecified laterality  Comments:  Begin Prednisone 5mg q.d. x 5 days.  Begin Zyrtec 10mg q.d..  Orders:  -     predniSONE (DELTASONE) 5 MG tablet; Take 1 tablet (5 mg total) by mouth once daily. for 5 days  Dispense: 5 tablet; Refill: 0    Intertrigo  Comments:  Begin applying Lotrisone cream b.i.d. x7 days.  Keep region dry, and cleans the area with soap and water alone.  Orders:  -     clotrimazole-betamethasone 1-0.05% (LOTRISONE) cream; Apply topically 2 (two) times daily. for 7 days  Dispense: 45 g;  Refill: 0    Impacted cerumen of right ear  Comments:  Ear irrigation performed today in office with cerumen successfully removed.  Patient tolerated irrigation well and without complications.  Orders:  -     Ear wax removal    If no improvement in symptoms after completing treatment regimen, contact the office.    Follow up if symptoms worsen or fail to improve, for Acute serous otitis media, Intertrigo.        7/20/2022 Delfin Banks PA-C

## 2022-07-29 ENCOUNTER — PATIENT MESSAGE (OUTPATIENT)
Dept: FAMILY MEDICINE | Facility: CLINIC | Age: 67
End: 2022-07-29

## 2022-07-29 DIAGNOSIS — H92.09 ACUTE EAR PAIN, UNSPECIFIED LATERALITY: Primary | ICD-10-CM

## 2022-07-29 RX ORDER — METHYLPREDNISOLONE 4 MG/1
TABLET ORAL
Qty: 21 EACH | Refills: 0 | Status: SHIPPED | OUTPATIENT
Start: 2022-07-29 | End: 2022-09-27

## 2022-07-29 NOTE — TELEPHONE ENCOUNTER
Will send extend duration of steroid.  Start Medrol Dosepak.  If this does not alleviate the ear fullness, she will need to follow-up with ENT.  Please verify which pharmacy she would like this prescription sent.  Thanks.

## 2022-08-01 ENCOUNTER — CLINICAL SUPPORT (OUTPATIENT)
Dept: UROLOGY | Facility: CLINIC | Age: 67
End: 2022-08-01
Payer: MEDICARE

## 2022-08-01 ENCOUNTER — PATIENT MESSAGE (OUTPATIENT)
Dept: UROLOGY | Facility: CLINIC | Age: 67
End: 2022-08-01

## 2022-08-01 DIAGNOSIS — R39.9 UTI SYMPTOMS: Primary | ICD-10-CM

## 2022-08-01 LAB
BILIRUB UR QL STRIP: NEGATIVE
CLARITY UR: CLEAR
COLOR UR: YELLOW
GLUCOSE UR QL STRIP: NEGATIVE
HGB UR QL STRIP: NEGATIVE
KETONES UR QL STRIP: NEGATIVE
LEUKOCYTE ESTERASE UR QL STRIP: NEGATIVE
MICROSCOPIC COMMENT: NORMAL
NITRITE UR QL STRIP: NEGATIVE
PH UR STRIP: 7 [PH] (ref 5–8)
PROT UR QL STRIP: NEGATIVE
RBC #/AREA URNS HPF: 0 /HPF (ref 0–4)
SP GR UR STRIP: 1.02 (ref 1–1.03)
SQUAMOUS #/AREA URNS HPF: 1 /HPF
URN SPEC COLLECT METH UR: NORMAL
UROBILINOGEN UR STRIP-ACNC: 1 EU/DL
WBC #/AREA URNS HPF: 1 /HPF (ref 0–5)

## 2022-08-01 PROCEDURE — 81000 URINALYSIS NONAUTO W/SCOPE: CPT | Performed by: NURSE PRACTITIONER

## 2022-08-01 PROCEDURE — 99499 NO LOS: ICD-10-PCS | Mod: S$GLB,,, | Performed by: NURSE PRACTITIONER

## 2022-08-01 PROCEDURE — 87086 URINE CULTURE/COLONY COUNT: CPT | Performed by: NURSE PRACTITIONER

## 2022-08-01 PROCEDURE — 99499 UNLISTED E&M SERVICE: CPT | Mod: S$GLB,,, | Performed by: NURSE PRACTITIONER

## 2022-08-01 NOTE — PROGRESS NOTES
Pt arrived in clinic for cath specimen. Ua, urine culture, and micro analysis. Pt positioned on exam table and draped. Betadine used to clean area, 10french cath used, 30 cc's yellow urine voided into specimen cup. Specimen cup labeled and sent to lab for testing. Pt left in satisfactory condition.

## 2022-08-03 LAB — BACTERIA UR CULT: NO GROWTH

## 2022-08-24 ENCOUNTER — PATIENT MESSAGE (OUTPATIENT)
Dept: PULMONOLOGY | Facility: CLINIC | Age: 67
End: 2022-08-24
Payer: MEDICARE

## 2022-08-25 DIAGNOSIS — J45.30 MILD PERSISTENT ASTHMA WITHOUT COMPLICATION: ICD-10-CM

## 2022-08-25 RX ORDER — ALBUTEROL SULFATE 90 UG/1
2 AEROSOL, METERED RESPIRATORY (INHALATION) EVERY 4 HOURS PRN
Qty: 18 G | Refills: 0 | Status: SHIPPED | OUTPATIENT
Start: 2022-08-25 | End: 2022-09-27 | Stop reason: SDUPTHER

## 2022-09-01 RX ORDER — ROSUVASTATIN CALCIUM 5 MG/1
5 TABLET, COATED ORAL DAILY
Qty: 30 TABLET | Refills: 2 | OUTPATIENT
Start: 2022-09-01 | End: 2022-11-30

## 2022-09-01 NOTE — TELEPHONE ENCOUNTER
----- Message from Lemuel Hsu sent at 2022  9:31 AM CDT -----  Type:  Sooner Appointment Request    Caller is requesting a sooner appointment.  Caller declined first available appointment listed below.  Caller will not accept being placed on the waitlist and is requesting a message be sent to doctor.    Name of Caller:  Pt  When is the first available appointment?     Symptoms:  check ups  Best Call Back Number:  [phone]     Additional Information:  Sts that her and her  would like to get back to back appts for Dr Larose for check ups.  They would like to have first in the morning or first after lunch if possible.  Husbands name is Domo Reeves  [birth year] MRN  [id number].    Please advise -- Thank you

## 2022-09-08 ENCOUNTER — PATIENT MESSAGE (OUTPATIENT)
Dept: CARDIOLOGY | Facility: CLINIC | Age: 67
End: 2022-09-08
Payer: MEDICARE

## 2022-09-09 ENCOUNTER — PATIENT MESSAGE (OUTPATIENT)
Dept: PULMONOLOGY | Facility: CLINIC | Age: 67
End: 2022-09-09
Payer: MEDICARE

## 2022-09-17 ENCOUNTER — PATIENT MESSAGE (OUTPATIENT)
Dept: PULMONOLOGY | Facility: CLINIC | Age: 67
End: 2022-09-17
Payer: MEDICARE

## 2022-09-27 ENCOUNTER — OFFICE VISIT (OUTPATIENT)
Dept: PULMONOLOGY | Facility: CLINIC | Age: 67
End: 2022-09-27
Payer: MEDICARE

## 2022-09-27 VITALS
HEART RATE: 61 BPM | SYSTOLIC BLOOD PRESSURE: 147 MMHG | DIASTOLIC BLOOD PRESSURE: 68 MMHG | WEIGHT: 174.06 LBS | BODY MASS INDEX: 27.32 KG/M2 | HEIGHT: 67 IN | OXYGEN SATURATION: 98 %

## 2022-09-27 DIAGNOSIS — J45.30 MILD PERSISTENT ASTHMA WITHOUT COMPLICATION: ICD-10-CM

## 2022-09-27 DIAGNOSIS — G47.33 OBSTRUCTIVE SLEEP APNEA: Primary | ICD-10-CM

## 2022-09-27 PROCEDURE — 99213 PR OFFICE/OUTPT VISIT, EST, LEVL III, 20-29 MIN: ICD-10-PCS | Mod: S$GLB,,, | Performed by: NURSE PRACTITIONER

## 2022-09-27 PROCEDURE — 99999 PR PBB SHADOW E&M-EST. PATIENT-LVL III: ICD-10-PCS | Mod: PBBFAC,,, | Performed by: NURSE PRACTITIONER

## 2022-09-27 PROCEDURE — 3077F SYST BP >= 140 MM HG: CPT | Mod: CPTII,S$GLB,, | Performed by: NURSE PRACTITIONER

## 2022-09-27 PROCEDURE — 99999 PR PBB SHADOW E&M-EST. PATIENT-LVL III: CPT | Mod: PBBFAC,,, | Performed by: NURSE PRACTITIONER

## 2022-09-27 PROCEDURE — 1126F PR PAIN SEVERITY QUANTIFIED, NO PAIN PRESENT: ICD-10-PCS | Mod: CPTII,S$GLB,, | Performed by: NURSE PRACTITIONER

## 2022-09-27 PROCEDURE — 99213 OFFICE O/P EST LOW 20 MIN: CPT | Mod: S$GLB,,, | Performed by: NURSE PRACTITIONER

## 2022-09-27 PROCEDURE — 1101F PT FALLS ASSESS-DOCD LE1/YR: CPT | Mod: CPTII,S$GLB,, | Performed by: NURSE PRACTITIONER

## 2022-09-27 PROCEDURE — 1159F MED LIST DOCD IN RCRD: CPT | Mod: CPTII,S$GLB,, | Performed by: NURSE PRACTITIONER

## 2022-09-27 PROCEDURE — 3078F PR MOST RECENT DIASTOLIC BLOOD PRESSURE < 80 MM HG: ICD-10-PCS | Mod: CPTII,S$GLB,, | Performed by: NURSE PRACTITIONER

## 2022-09-27 PROCEDURE — 3008F BODY MASS INDEX DOCD: CPT | Mod: CPTII,S$GLB,, | Performed by: NURSE PRACTITIONER

## 2022-09-27 PROCEDURE — 1101F PR PT FALLS ASSESS DOC 0-1 FALLS W/OUT INJ PAST YR: ICD-10-PCS | Mod: CPTII,S$GLB,, | Performed by: NURSE PRACTITIONER

## 2022-09-27 PROCEDURE — 3288F PR FALLS RISK ASSESSMENT DOCUMENTED: ICD-10-PCS | Mod: CPTII,S$GLB,, | Performed by: NURSE PRACTITIONER

## 2022-09-27 PROCEDURE — 3077F PR MOST RECENT SYSTOLIC BLOOD PRESSURE >= 140 MM HG: ICD-10-PCS | Mod: CPTII,S$GLB,, | Performed by: NURSE PRACTITIONER

## 2022-09-27 PROCEDURE — 3008F PR BODY MASS INDEX (BMI) DOCUMENTED: ICD-10-PCS | Mod: CPTII,S$GLB,, | Performed by: NURSE PRACTITIONER

## 2022-09-27 PROCEDURE — 1159F PR MEDICATION LIST DOCUMENTED IN MEDICAL RECORD: ICD-10-PCS | Mod: CPTII,S$GLB,, | Performed by: NURSE PRACTITIONER

## 2022-09-27 PROCEDURE — 1126F AMNT PAIN NOTED NONE PRSNT: CPT | Mod: CPTII,S$GLB,, | Performed by: NURSE PRACTITIONER

## 2022-09-27 PROCEDURE — 3078F DIAST BP <80 MM HG: CPT | Mod: CPTII,S$GLB,, | Performed by: NURSE PRACTITIONER

## 2022-09-27 PROCEDURE — 3288F FALL RISK ASSESSMENT DOCD: CPT | Mod: CPTII,S$GLB,, | Performed by: NURSE PRACTITIONER

## 2022-09-27 RX ORDER — PREDNISONE 20 MG/1
TABLET ORAL
Qty: 21 TABLET | Refills: 0 | Status: SHIPPED | OUTPATIENT
Start: 2022-09-27

## 2022-09-27 RX ORDER — AZITHROMYCIN 500 MG/1
500 TABLET, FILM COATED ORAL DAILY
Qty: 3 TABLET | Refills: 1 | Status: SHIPPED | OUTPATIENT
Start: 2022-09-27 | End: 2023-05-02

## 2022-09-27 RX ORDER — THYROID 30 MG/1
30 TABLET ORAL DAILY
COMMUNITY
Start: 2022-02-10

## 2022-09-27 RX ORDER — ALBUTEROL SULFATE 90 UG/1
2 AEROSOL, METERED RESPIRATORY (INHALATION) EVERY 4 HOURS PRN
Qty: 18 G | Refills: 11 | Status: SHIPPED | OUTPATIENT
Start: 2022-09-27

## 2022-09-27 NOTE — PATIENT INSTRUCTIONS
Asthma seems controlled. Continue Trelegy once per day. Albuterol as needed for shortness of breath, cough, wheezing.    Asthma Action plan for use as needed:  Azithromycin 500 mg, take one pill for three days as needed for shortness of breath, cough, green mucous.  Prednisone 20 mg pills, Take one pill a day for three days, repeat for shortness of breath or wheeze.    Can proceed with FEES swallow study in the future if interested.    Continue current medication regiment. Keep follow up appointment as scheduled. Please call the office if you have any questions or concerns.
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
## 2022-10-06 ENCOUNTER — LAB VISIT (OUTPATIENT)
Dept: LAB | Facility: HOSPITAL | Age: 67
End: 2022-10-06
Attending: INTERNAL MEDICINE
Payer: MEDICARE

## 2022-10-06 DIAGNOSIS — I49.9 VENTRICULAR ARRHYTHMIA: ICD-10-CM

## 2022-10-06 DIAGNOSIS — E78.00 HYPERCHOLESTEROLEMIA: ICD-10-CM

## 2022-10-06 DIAGNOSIS — R93.1 ELEVATED CORONARY ARTERY CALCIUM SCORE: ICD-10-CM

## 2022-10-06 LAB
ALBUMIN SERPL BCP-MCNC: 4.1 G/DL (ref 3.5–5.2)
ALP SERPL-CCNC: 55 U/L (ref 55–135)
ALT SERPL W/O P-5'-P-CCNC: 22 U/L (ref 10–44)
ANION GAP SERPL CALC-SCNC: 3 MMOL/L (ref 8–16)
AST SERPL-CCNC: 20 U/L (ref 10–40)
BILIRUB SERPL-MCNC: 0.9 MG/DL (ref 0.1–1)
BUN SERPL-MCNC: 22 MG/DL (ref 8–23)
CALCIUM SERPL-MCNC: 10.3 MG/DL (ref 8.7–10.5)
CHLORIDE SERPL-SCNC: 106 MMOL/L (ref 95–110)
CO2 SERPL-SCNC: 26 MMOL/L (ref 23–29)
CREAT SERPL-MCNC: 1 MG/DL (ref 0.5–1.4)
ERYTHROCYTE [DISTWIDTH] IN BLOOD BY AUTOMATED COUNT: 13.6 % (ref 11.5–14.5)
EST. GFR  (NO RACE VARIABLE): >60 ML/MIN/1.73 M^2
GLUCOSE SERPL-MCNC: 104 MG/DL (ref 70–110)
HCT VFR BLD AUTO: 44.5 % (ref 37–48.5)
HGB BLD-MCNC: 14.4 G/DL (ref 12–16)
MCH RBC QN AUTO: 29.7 PG (ref 27–31)
MCHC RBC AUTO-ENTMCNC: 32.4 G/DL (ref 32–36)
MCV RBC AUTO: 92 FL (ref 82–98)
PLATELET # BLD AUTO: 346 K/UL (ref 150–450)
PMV BLD AUTO: 9.8 FL (ref 9.2–12.9)
POTASSIUM SERPL-SCNC: 4.2 MMOL/L (ref 3.5–5.1)
PROT SERPL-MCNC: 6.9 G/DL (ref 6–8.4)
RBC # BLD AUTO: 4.85 M/UL (ref 4–5.4)
SODIUM SERPL-SCNC: 135 MMOL/L (ref 136–145)
WBC # BLD AUTO: 7.78 K/UL (ref 3.9–12.7)

## 2022-10-06 PROCEDURE — 36415 COLL VENOUS BLD VENIPUNCTURE: CPT | Performed by: INTERNAL MEDICINE

## 2022-10-06 PROCEDURE — 85027 COMPLETE CBC AUTOMATED: CPT | Performed by: INTERNAL MEDICINE

## 2022-10-06 PROCEDURE — 80053 COMPREHEN METABOLIC PANEL: CPT | Performed by: INTERNAL MEDICINE

## 2022-10-11 ENCOUNTER — OFFICE VISIT (OUTPATIENT)
Dept: CARDIOLOGY | Facility: CLINIC | Age: 67
End: 2022-10-11
Payer: MEDICARE

## 2022-10-11 VITALS
OXYGEN SATURATION: 96 % | WEIGHT: 178 LBS | HEIGHT: 67 IN | DIASTOLIC BLOOD PRESSURE: 78 MMHG | SYSTOLIC BLOOD PRESSURE: 122 MMHG | BODY MASS INDEX: 27.94 KG/M2 | HEART RATE: 80 BPM

## 2022-10-11 DIAGNOSIS — I49.9 VENTRICULAR ARRHYTHMIA: ICD-10-CM

## 2022-10-11 DIAGNOSIS — J45.30 MILD PERSISTENT ASTHMA WITHOUT COMPLICATION: ICD-10-CM

## 2022-10-11 DIAGNOSIS — E78.00 HYPERCHOLESTEROLEMIA: Primary | ICD-10-CM

## 2022-10-11 DIAGNOSIS — R93.1 ELEVATED CORONARY ARTERY CALCIUM SCORE: Chronic | ICD-10-CM

## 2022-10-11 PROCEDURE — 1159F MED LIST DOCD IN RCRD: CPT | Mod: CPTII,S$GLB,, | Performed by: INTERNAL MEDICINE

## 2022-10-11 PROCEDURE — 1101F PT FALLS ASSESS-DOCD LE1/YR: CPT | Mod: CPTII,S$GLB,, | Performed by: INTERNAL MEDICINE

## 2022-10-11 PROCEDURE — 99212 PR OFFICE/OUTPT VISIT, EST, LEVL II, 10-19 MIN: ICD-10-PCS | Mod: S$GLB,,, | Performed by: INTERNAL MEDICINE

## 2022-10-11 PROCEDURE — 3074F SYST BP LT 130 MM HG: CPT | Mod: CPTII,S$GLB,, | Performed by: INTERNAL MEDICINE

## 2022-10-11 PROCEDURE — 1101F PR PT FALLS ASSESS DOC 0-1 FALLS W/OUT INJ PAST YR: ICD-10-PCS | Mod: CPTII,S$GLB,, | Performed by: INTERNAL MEDICINE

## 2022-10-11 PROCEDURE — 1126F AMNT PAIN NOTED NONE PRSNT: CPT | Mod: CPTII,S$GLB,, | Performed by: INTERNAL MEDICINE

## 2022-10-11 PROCEDURE — 99212 OFFICE O/P EST SF 10 MIN: CPT | Mod: S$GLB,,, | Performed by: INTERNAL MEDICINE

## 2022-10-11 PROCEDURE — 1126F PR PAIN SEVERITY QUANTIFIED, NO PAIN PRESENT: ICD-10-PCS | Mod: CPTII,S$GLB,, | Performed by: INTERNAL MEDICINE

## 2022-10-11 PROCEDURE — 1160F RVW MEDS BY RX/DR IN RCRD: CPT | Mod: CPTII,S$GLB,, | Performed by: INTERNAL MEDICINE

## 2022-10-11 PROCEDURE — 3078F PR MOST RECENT DIASTOLIC BLOOD PRESSURE < 80 MM HG: ICD-10-PCS | Mod: CPTII,S$GLB,, | Performed by: INTERNAL MEDICINE

## 2022-10-11 PROCEDURE — 3078F DIAST BP <80 MM HG: CPT | Mod: CPTII,S$GLB,, | Performed by: INTERNAL MEDICINE

## 2022-10-11 PROCEDURE — 3074F PR MOST RECENT SYSTOLIC BLOOD PRESSURE < 130 MM HG: ICD-10-PCS | Mod: CPTII,S$GLB,, | Performed by: INTERNAL MEDICINE

## 2022-10-11 PROCEDURE — 1160F PR REVIEW ALL MEDS BY PRESCRIBER/CLIN PHARMACIST DOCUMENTED: ICD-10-PCS | Mod: CPTII,S$GLB,, | Performed by: INTERNAL MEDICINE

## 2022-10-11 PROCEDURE — 1159F PR MEDICATION LIST DOCUMENTED IN MEDICAL RECORD: ICD-10-PCS | Mod: CPTII,S$GLB,, | Performed by: INTERNAL MEDICINE

## 2022-10-11 PROCEDURE — 3288F PR FALLS RISK ASSESSMENT DOCUMENTED: ICD-10-PCS | Mod: CPTII,S$GLB,, | Performed by: INTERNAL MEDICINE

## 2022-10-11 PROCEDURE — 3288F FALL RISK ASSESSMENT DOCD: CPT | Mod: CPTII,S$GLB,, | Performed by: INTERNAL MEDICINE

## 2022-10-11 NOTE — PROGRESS NOTES
Patient ID:  Eboni Reeves is a 67 y.o. female who presents for follow-up of Hyperlipidemia      She has been doing great denies any chest pains any shortness of breath.  Her asthma has been doing well      Past Medical History:   Diagnosis Date    Asthma     Cystitis, interstitial     DVT (deep venous thrombosis)     Encounter for blood transfusion     Fx     RIGHT ANKLE    Hyperlipidemia     LVH (left ventricular hypertrophy)     Renal disorder     STONE    Stroke     AGE 20    TMJ (dislocation of temporomandibular joint)     Urinary tract infection         Past Surgical History:   Procedure Laterality Date    APPENDECTOMY      BLADDER SURGERY      CYSTOURETEROSCOPY WITH RETROGRADE PYELOGRAPHY AND INSERTION OF STENT INTO URETER Right 5/5/2021    Procedure: CYSTOURETEROSCOPY, WITH RETROGRADE PYELOGRAM AND URETERAL STENT INSERTION;  Surgeon: Kelsey Winkler MD;  Location: [facility] OR;  Service: Urology;  Laterality: Right;    HYSTERECTOMY      partial hysterectomy    LASER LITHOTRIPSY Right 5/5/2021    Procedure: LITHOTRIPSY, USING LASER;  Surgeon: Kelsey Winkler MD;  Location: [facility] OR;  Service: Urology;  Laterality: Right;    MANDIBLE FRACTURE SURGERY      TONSILLECTOMY      WRIST SURGERY      right wrist, had plate put in          Current Outpatient Medications   Medication Instructions    albuterol (PROVENTIL/VENTOLIN HFA) 90 mcg/actuation inhaler 2 puffs, Inhalation, Every 4 hours PRN, Rescue     azithromycin (ZITHROMAX) 500 mg, Oral, Daily    fluticasone propionate (FLONASE) 50 mcg, Each Nostril, Daily    fluticasone-umeclidin-vilanter (TRELEGY ELLIPTA) 100-62.5-25 mcg DsDv 1 puff, Inhalation, Daily    montelukast (SINGULAIR) 10 mg, Oral, Nightly    MYRBETRIQ 50 mg, Oral, Daily    predniSONE (DELTASONE) 20 MG tablet Take one pill per day for three days as needed for shortness of breath, wheezing, cough. May repeat as needed.    rosuvastatin (CRESTOR) 5 mg, Oral, Daily    spironolactone  "(ALDACTONE) 100 MG tablet No dose, route, or frequency recorded.    thyroid (pork) (ARMOUR THYROID) 30 mg, Per G Tube, Daily        Review of patient's allergies indicates:  No Known Allergies     Review of Systems   Cardiovascular:  Negative for chest pain, dyspnea on exertion, irregular heartbeat, leg swelling and palpitations.   Respiratory:  Positive for cough and wheezing.       Objective:     Vitals:    10/11/22 1337   BP: 122/78   BP Location: Left arm   Patient Position: Sitting   BP Method: Medium (Manual)   Pulse: 80   SpO2: 96%   Weight: 80.7 kg (178 lb)   Height: 5' 7" (1.702 m)       Physical Exam  Vitals and nursing note reviewed.   Constitutional:       Appearance: She is well-developed.   HENT:      Head: Normocephalic and atraumatic.   Eyes:      Conjunctiva/sclera: Conjunctivae normal.   Cardiovascular:      Rate and Rhythm: Normal rate and regular rhythm.      Heart sounds: Normal heart sounds.   Pulmonary:      Effort: Pulmonary effort is normal.      Breath sounds: Normal breath sounds.   Abdominal:      General: Bowel sounds are normal.      Palpations: Abdomen is soft.   Musculoskeletal:         General: Normal range of motion.   Skin:     General: Skin is warm and dry.   Neurological:      Mental Status: She is alert and oriented to person, place, and time.   Psychiatric:         Behavior: Behavior normal.         Thought Content: Thought content normal.         Judgment: Judgment normal.     CMP  Sodium   Date Value Ref Range Status   10/06/2022 135 (L) 136 - 145 mmol/L Final   01/11/2017 140 134 - 144 mmol/L      Potassium   Date Value Ref Range Status   10/06/2022 4.2 3.5 - 5.1 mmol/L Final     Chloride   Date Value Ref Range Status   10/06/2022 106 95 - 110 mmol/L Final   01/11/2017 104 98 - 110 mmol/L      CO2   Date Value Ref Range Status   10/06/2022 26 23 - 29 mmol/L Final     Glucose   Date Value Ref Range Status   10/06/2022 104 70 - 110 mg/dL Final   01/11/2017 87 70 - 99 mg/dL  "     BUN   Date Value Ref Range Status   10/06/2022 22 8 - 23 mg/dL Final     Creatinine   Date Value Ref Range Status   10/06/2022 1.0 0.5 - 1.4 mg/dL Final   01/11/2017 0.76 0.60 - 1.40 mg/dL      Calcium   Date Value Ref Range Status   10/06/2022 10.3 8.7 - 10.5 mg/dL Final     Total Protein   Date Value Ref Range Status   10/06/2022 6.9 6.0 - 8.4 g/dL Final     Albumin   Date Value Ref Range Status   10/06/2022 4.1 3.5 - 5.2 g/dL Final   01/11/2017 4.4 3.1 - 4.7 g/dL      Total Bilirubin   Date Value Ref Range Status   10/06/2022 0.9 0.1 - 1.0 mg/dL Final     Comment:     For infants and newborns, interpretation of results should be based  on gestational age, weight and in agreement with clinical  observations.    Premature Infant recommended reference ranges:  Up to 24 hours.............<8.0 mg/dL  Up to 48 hours............<12.0 mg/dL  3-5 days..................<15.0 mg/dL  6-29 days.................<15.0 mg/dL       Alkaline Phosphatase   Date Value Ref Range Status   10/06/2022 55 55 - 135 U/L Final     AST   Date Value Ref Range Status   10/06/2022 20 10 - 40 U/L Final     ALT   Date Value Ref Range Status   10/06/2022 22 10 - 44 U/L Final     Anion Gap   Date Value Ref Range Status   10/06/2022 3 (L) 8 - 16 mmol/L Final     eGFR if    Date Value Ref Range Status   09/30/2021 >60.0 >60 mL/min/1.73 m^2 Final     eGFR if non    Date Value Ref Range Status   09/30/2021 >60.0 >60 mL/min/1.73 m^2 Final     Comment:     Calculation used to obtain the estimated glomerular filtration  rate (eGFR) is the CKD-EPI equation.         BMP  Lab Results   Component Value Date     (L) 10/06/2022    K 4.2 10/06/2022     10/06/2022    CO2 26 10/06/2022    BUN 22 10/06/2022    CREATININE 1.0 10/06/2022    CALCIUM 10.3 10/06/2022    ANIONGAP 3 (L) 10/06/2022    ESTGFRAFRICA >60.0 09/30/2021    EGFRNONAA >60.0 09/30/2021      BNP  @LABRCNTIP(BNP,BNPTRIAGEBLO)@   Lab Results   Component  Value Date    CHOL 216 (H) 09/30/2021    CHOL 233 (H) 03/24/2021     Lab Results   Component Value Date    HDL 81 (H) 09/30/2021    HDL 88 03/24/2021     Lab Results   Component Value Date    LDLCALC 120.2 09/30/2021    LDLCALC 132 (H) 03/24/2021     Lab Results   Component Value Date    TRIG 74 09/30/2021    TRIG 74 03/24/2021     Lab Results   Component Value Date    CHOLHDL 37.5 09/30/2021      Lab Results   Component Value Date    TSH 2.930 03/24/2021     No results found for: LABA1C, HGBA1C  Lab Results   Component Value Date    WBC 7.78 10/06/2022    HGB 14.4 10/06/2022    HCT 44.5 10/06/2022    MCV 92 10/06/2022     10/06/2022         No results found for this or any previous visit.     No results found for this or any previous visit.         Assessment:       Hypercholesterolemia  On 5 mg of rosuvastatin    Elevated coronary artery calcium score  Stable       Plan:           Continue the current medical therapy returns to the office in 6 months with a lipid and a CMP.

## 2022-11-10 ENCOUNTER — LAB VISIT (OUTPATIENT)
Dept: LAB | Facility: HOSPITAL | Age: 67
End: 2022-11-10
Attending: INTERNAL MEDICINE
Payer: MEDICARE

## 2022-11-10 DIAGNOSIS — R13.10 PROBLEMS WITH SWALLOWING AND MASTICATION: ICD-10-CM

## 2022-11-10 DIAGNOSIS — Z86.010 PERSONAL HISTORY OF COLONIC POLYPS: ICD-10-CM

## 2022-11-10 DIAGNOSIS — K21.9 ESOPHAGEAL REFLUX: Primary | ICD-10-CM

## 2022-11-10 PROCEDURE — 86003 ALLG SPEC IGE CRUDE XTRC EA: CPT | Mod: 59 | Performed by: INTERNAL MEDICINE

## 2022-11-10 PROCEDURE — 36415 COLL VENOUS BLD VENIPUNCTURE: CPT | Performed by: INTERNAL MEDICINE

## 2022-11-18 LAB
ALLERGEN CLASS DESCRIPTION: NORMAL
BEEF IGE QN: <0.1 KU/L
COCOA IGE QN: <0.1 KU/L
CORN IGE QN: <0.1 KU/L
COW MILK IGE QN: <0.1 KU/L
FOOD MIX, SEAFOOD, IGE: NEGATIVE
PEANUT IGE QN: <0.1 KU/L
PORK IGE QN: <0.1 KU/L
SOYBEAN IGE QN: <0.1 KU/L
WHEAT IGE QN: <0.1 KU/L
WHOLE EGG IGE QN: <0.1 KU/L

## 2022-12-30 DIAGNOSIS — J45.30 MILD PERSISTENT ASTHMA WITHOUT COMPLICATION: ICD-10-CM

## 2022-12-30 RX ORDER — PREDNISONE 20 MG/1
TABLET ORAL
Qty: 21 TABLET | Refills: 0 | OUTPATIENT
Start: 2022-12-30

## 2022-12-30 RX ORDER — AZITHROMYCIN 500 MG/1
500 TABLET, FILM COATED ORAL DAILY
Qty: 3 TABLET | Refills: 1 | OUTPATIENT
Start: 2022-12-30

## 2023-01-03 ENCOUNTER — PATIENT MESSAGE (OUTPATIENT)
Dept: UROLOGY | Facility: CLINIC | Age: 68
End: 2023-01-03
Payer: MEDICARE

## 2023-01-03 RX ORDER — MIRABEGRON 50 MG/1
50 TABLET, FILM COATED, EXTENDED RELEASE ORAL DAILY
Qty: 90 TABLET | Refills: 1 | Status: SHIPPED | OUTPATIENT
Start: 2023-01-03 | End: 2023-07-17

## 2023-01-04 NOTE — TELEPHONE ENCOUNTER
Although she hasn't been seen by me since 5/2021- Pt was seen by kim in June 2022. Ok to refill mybetriq until July 2023. Please make sure she has June/July 2023 f/u

## 2023-01-09 ENCOUNTER — OFFICE VISIT (OUTPATIENT)
Dept: PULMONOLOGY | Facility: CLINIC | Age: 68
End: 2023-01-09
Payer: MEDICARE

## 2023-01-09 DIAGNOSIS — R05.9 COUGH, UNSPECIFIED TYPE: ICD-10-CM

## 2023-01-09 DIAGNOSIS — G47.33 OBSTRUCTIVE SLEEP APNEA: Primary | ICD-10-CM

## 2023-01-09 DIAGNOSIS — J45.51 SEVERE PERSISTENT ASTHMA WITH ACUTE EXACERBATION: ICD-10-CM

## 2023-01-09 DIAGNOSIS — R09.89 CHRONIC SINUS COMPLAINTS: ICD-10-CM

## 2023-01-09 PROCEDURE — 99442 PR PHYSICIAN TELEPHONE EVALUATION 11-20 MIN: CPT | Mod: 95,,, | Performed by: NURSE PRACTITIONER

## 2023-01-09 PROCEDURE — 99442 PR PHYSICIAN TELEPHONE EVALUATION 11-20 MIN: ICD-10-PCS | Mod: 95,,, | Performed by: NURSE PRACTITIONER

## 2023-01-09 RX ORDER — PREDNISONE 20 MG/1
TABLET ORAL
Qty: 18 TABLET | Refills: 0 | Status: SHIPPED | OUTPATIENT
Start: 2023-01-09 | End: 2023-01-24 | Stop reason: SDUPTHER

## 2023-01-09 RX ORDER — AMOXICILLIN AND CLAVULANATE POTASSIUM 875; 125 MG/1; MG/1
1 TABLET, FILM COATED ORAL 2 TIMES DAILY
Qty: 14 TABLET | Refills: 0 | Status: SHIPPED | OUTPATIENT
Start: 2023-01-09 | End: 2023-01-16

## 2023-01-09 RX ORDER — ALBUTEROL SULFATE 1.25 MG/3ML
1.25 SOLUTION RESPIRATORY (INHALATION) EVERY 6 HOURS PRN
Qty: 75 ML | Refills: 11 | Status: SHIPPED | OUTPATIENT
Start: 2023-01-09 | End: 2024-01-09

## 2023-01-09 RX ORDER — PROMETHAZINE HYDROCHLORIDE AND DEXTROMETHORPHAN HYDROBROMIDE 6.25; 15 MG/5ML; MG/5ML
5 SYRUP ORAL NIGHTLY PRN
Qty: 240 ML | Refills: 0 | Status: SHIPPED | OUTPATIENT
Start: 2023-01-09 | End: 2023-01-19

## 2023-01-09 NOTE — PATIENT INSTRUCTIONS
Continue current asthma regimen including Trelegy once per day and albuterol as needed.    Will order nebulizer machine to be used as needed.    Prednisone, take as prescribed    Augmentin, take as prescribed    Chest x-ray now.    Will consider biologic therapy in the future.    Promethazine cough syrup to be taken only as needed for cough.  Caution as this medication may make you drowsy, do not drive after use.  Do not take with other potentially sedating medications.  Do not mix with alcohol.    Continue current medication regiment. Keep follow up appointment as scheduled. Please call the office if you have any questions or concerns.
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
## 2023-01-12 ENCOUNTER — HOSPITAL ENCOUNTER (OUTPATIENT)
Dept: RADIOLOGY | Facility: HOSPITAL | Age: 68
Discharge: HOME | End: 2023-01-12
Attending: NURSE PRACTITIONER
Payer: MEDICARE

## 2023-01-12 ENCOUNTER — PATIENT MESSAGE (OUTPATIENT)
Dept: PULMONOLOGY | Facility: CLINIC | Age: 68
End: 2023-01-12
Payer: MEDICARE

## 2023-01-12 DIAGNOSIS — J45.51 SEVERE PERSISTENT ASTHMA WITH ACUTE EXACERBATION: ICD-10-CM

## 2023-01-12 PROCEDURE — 71046 X-RAY EXAM CHEST 2 VIEWS: CPT | Mod: TC,PO

## 2023-01-19 ENCOUNTER — PATIENT MESSAGE (OUTPATIENT)
Dept: PULMONOLOGY | Facility: CLINIC | Age: 68
End: 2023-01-19
Payer: MEDICARE

## 2023-01-24 ENCOUNTER — OFFICE VISIT (OUTPATIENT)
Dept: PULMONOLOGY | Facility: CLINIC | Age: 68
End: 2023-01-24
Payer: MEDICARE

## 2023-01-24 VITALS
SYSTOLIC BLOOD PRESSURE: 133 MMHG | OXYGEN SATURATION: 97 % | DIASTOLIC BLOOD PRESSURE: 75 MMHG | WEIGHT: 168.19 LBS | HEART RATE: 96 BPM | BODY MASS INDEX: 26.4 KG/M2 | HEIGHT: 67 IN

## 2023-01-24 DIAGNOSIS — G47.33 OBSTRUCTIVE SLEEP APNEA: ICD-10-CM

## 2023-01-24 DIAGNOSIS — J45.51 SEVERE PERSISTENT ASTHMA WITH EXACERBATION: Primary | ICD-10-CM

## 2023-01-24 PROCEDURE — 1160F RVW MEDS BY RX/DR IN RCRD: CPT | Mod: CPTII,S$GLB,, | Performed by: NURSE PRACTITIONER

## 2023-01-24 PROCEDURE — 96372 THER/PROPH/DIAG INJ SC/IM: CPT | Mod: S$GLB,,, | Performed by: NURSE PRACTITIONER

## 2023-01-24 PROCEDURE — 3008F BODY MASS INDEX DOCD: CPT | Mod: CPTII,S$GLB,, | Performed by: NURSE PRACTITIONER

## 2023-01-24 PROCEDURE — 3008F PR BODY MASS INDEX (BMI) DOCUMENTED: ICD-10-PCS | Mod: CPTII,S$GLB,, | Performed by: NURSE PRACTITIONER

## 2023-01-24 PROCEDURE — 3075F SYST BP GE 130 - 139MM HG: CPT | Mod: CPTII,S$GLB,, | Performed by: NURSE PRACTITIONER

## 2023-01-24 PROCEDURE — 1101F PR PT FALLS ASSESS DOC 0-1 FALLS W/OUT INJ PAST YR: ICD-10-PCS | Mod: CPTII,S$GLB,, | Performed by: NURSE PRACTITIONER

## 2023-01-24 PROCEDURE — 3078F PR MOST RECENT DIASTOLIC BLOOD PRESSURE < 80 MM HG: ICD-10-PCS | Mod: CPTII,S$GLB,, | Performed by: NURSE PRACTITIONER

## 2023-01-24 PROCEDURE — 3075F PR MOST RECENT SYSTOLIC BLOOD PRESS GE 130-139MM HG: ICD-10-PCS | Mod: CPTII,S$GLB,, | Performed by: NURSE PRACTITIONER

## 2023-01-24 PROCEDURE — 1126F AMNT PAIN NOTED NONE PRSNT: CPT | Mod: CPTII,S$GLB,, | Performed by: NURSE PRACTITIONER

## 2023-01-24 PROCEDURE — 1101F PT FALLS ASSESS-DOCD LE1/YR: CPT | Mod: CPTII,S$GLB,, | Performed by: NURSE PRACTITIONER

## 2023-01-24 PROCEDURE — 1160F PR REVIEW ALL MEDS BY PRESCRIBER/CLIN PHARMACIST DOCUMENTED: ICD-10-PCS | Mod: CPTII,S$GLB,, | Performed by: NURSE PRACTITIONER

## 2023-01-24 PROCEDURE — 99214 OFFICE O/P EST MOD 30 MIN: CPT | Mod: 25,S$GLB,, | Performed by: NURSE PRACTITIONER

## 2023-01-24 PROCEDURE — 3288F PR FALLS RISK ASSESSMENT DOCUMENTED: ICD-10-PCS | Mod: CPTII,S$GLB,, | Performed by: NURSE PRACTITIONER

## 2023-01-24 PROCEDURE — 96372 PR INJECTION,THERAP/PROPH/DIAG2ST, IM OR SUBCUT: ICD-10-PCS | Mod: S$GLB,,, | Performed by: NURSE PRACTITIONER

## 2023-01-24 PROCEDURE — 3288F FALL RISK ASSESSMENT DOCD: CPT | Mod: CPTII,S$GLB,, | Performed by: NURSE PRACTITIONER

## 2023-01-24 PROCEDURE — 99214 PR OFFICE/OUTPT VISIT, EST, LEVL IV, 30-39 MIN: ICD-10-PCS | Mod: 25,S$GLB,, | Performed by: NURSE PRACTITIONER

## 2023-01-24 PROCEDURE — 1159F PR MEDICATION LIST DOCUMENTED IN MEDICAL RECORD: ICD-10-PCS | Mod: CPTII,S$GLB,, | Performed by: NURSE PRACTITIONER

## 2023-01-24 PROCEDURE — 99999 PR PBB SHADOW E&M-EST. PATIENT-LVL IV: ICD-10-PCS | Mod: PBBFAC,,, | Performed by: NURSE PRACTITIONER

## 2023-01-24 PROCEDURE — 99999 PR PBB SHADOW E&M-EST. PATIENT-LVL IV: CPT | Mod: PBBFAC,,, | Performed by: NURSE PRACTITIONER

## 2023-01-24 PROCEDURE — 1126F PR PAIN SEVERITY QUANTIFIED, NO PAIN PRESENT: ICD-10-PCS | Mod: CPTII,S$GLB,, | Performed by: NURSE PRACTITIONER

## 2023-01-24 PROCEDURE — 1159F MED LIST DOCD IN RCRD: CPT | Mod: CPTII,S$GLB,, | Performed by: NURSE PRACTITIONER

## 2023-01-24 PROCEDURE — 3078F DIAST BP <80 MM HG: CPT | Mod: CPTII,S$GLB,, | Performed by: NURSE PRACTITIONER

## 2023-01-24 RX ORDER — BETAMETHASONE SODIUM PHOSPHATE AND BETAMETHASONE ACETATE 3; 3 MG/ML; MG/ML
6 INJECTION, SUSPENSION INTRA-ARTICULAR; INTRALESIONAL; INTRAMUSCULAR; SOFT TISSUE
Status: DISCONTINUED | OUTPATIENT
Start: 2023-01-24 | End: 2023-01-24

## 2023-01-24 RX ORDER — DEXAMETHASONE SODIUM PHOSPHATE 4 MG/ML
4 INJECTION, SOLUTION INTRA-ARTICULAR; INTRALESIONAL; INTRAMUSCULAR; INTRAVENOUS; SOFT TISSUE
Status: DISCONTINUED | OUTPATIENT
Start: 2023-01-24 | End: 2023-01-24

## 2023-01-24 RX ORDER — PREDNISONE 20 MG/1
TABLET ORAL
Qty: 18 TABLET | Refills: 0 | Status: ON HOLD | OUTPATIENT
Start: 2023-01-24 | End: 2024-02-23 | Stop reason: SDUPTHER

## 2023-01-24 RX ORDER — DEXAMETHASONE SODIUM PHOSPHATE 4 MG/ML
4 INJECTION, SOLUTION INTRA-ARTICULAR; INTRALESIONAL; INTRAMUSCULAR; INTRAVENOUS; SOFT TISSUE
Status: COMPLETED | OUTPATIENT
Start: 2023-01-24 | End: 2023-01-24

## 2023-01-24 RX ORDER — LANSOPRAZOLE 30 MG/1
30 CAPSULE, DELAYED RELEASE ORAL DAILY
COMMUNITY
Start: 2022-12-29

## 2023-01-24 RX ORDER — PROGESTERONE 200 MG/1
CAPSULE ORAL
COMMUNITY
Start: 2022-11-28

## 2023-01-24 RX ORDER — AZITHROMYCIN 250 MG/1
TABLET, FILM COATED ORAL
Qty: 6 TABLET | Refills: 0 | Status: SHIPPED | OUTPATIENT
Start: 2023-01-24 | End: 2023-05-02

## 2023-01-24 RX ADMIN — DEXAMETHASONE SODIUM PHOSPHATE 4 MG: 4 INJECTION, SOLUTION INTRA-ARTICULAR; INTRALESIONAL; INTRAMUSCULAR; INTRAVENOUS; SOFT TISSUE at 02:01
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
## 2023-01-24 NOTE — PROGRESS NOTES
Administered 4 mg dexamethasone IM. Patient tolerated well. No bleeding at insertion site noted. Pain scale 0/10 with injection. 2 patient identifiers used (name, ), aseptic technique maintained.

## 2023-01-24 NOTE — PATIENT INSTRUCTIONS
We discussed biologic therapy for Asthma  You have options such as Fasenra, Nucala, or Dupixent.     Have blood test before your next appointment    Start steroid taper tomorrow    Start antibiotic today    Continue to Wear CPAP nightly for sleep apnea

## 2023-03-07 ENCOUNTER — PATIENT MESSAGE (OUTPATIENT)
Dept: PULMONOLOGY | Facility: CLINIC | Age: 68
End: 2023-03-07
Payer: MEDICARE

## 2023-03-27 ENCOUNTER — TELEPHONE (OUTPATIENT)
Dept: CARDIOLOGY | Facility: CLINIC | Age: 68
End: 2023-03-27
Payer: MEDICARE

## 2023-03-27 NOTE — TELEPHONE ENCOUNTER
----- Message from Danielle Hurd sent at 3/27/2023  2:04 PM CDT -----  Contact: called at [phone]  Type: Needs Medical Advice  Who Called:  Pt  Best Call Back Number: [phone]  Additional Information: Pt is calling to get an appt for her and her  ( Domo Reeves). Please call back and advise.

## 2023-03-28 ENCOUNTER — OFFICE VISIT (OUTPATIENT)
Dept: FAMILY MEDICINE | Facility: CLINIC | Age: 68
End: 2023-03-28
Payer: MEDICARE

## 2023-03-28 VITALS
BODY MASS INDEX: 26.97 KG/M2 | DIASTOLIC BLOOD PRESSURE: 70 MMHG | OXYGEN SATURATION: 96 % | SYSTOLIC BLOOD PRESSURE: 128 MMHG | WEIGHT: 171.81 LBS | HEART RATE: 68 BPM | HEIGHT: 67 IN

## 2023-03-28 DIAGNOSIS — R42 VERTIGO: Primary | ICD-10-CM

## 2023-03-28 PROCEDURE — 1101F PR PT FALLS ASSESS DOC 0-1 FALLS W/OUT INJ PAST YR: ICD-10-PCS | Mod: CPTII,S$GLB,, | Performed by: NURSE PRACTITIONER

## 2023-03-28 PROCEDURE — 3074F PR MOST RECENT SYSTOLIC BLOOD PRESSURE < 130 MM HG: ICD-10-PCS | Mod: CPTII,S$GLB,, | Performed by: NURSE PRACTITIONER

## 2023-03-28 PROCEDURE — 99213 OFFICE O/P EST LOW 20 MIN: CPT | Mod: S$GLB,,, | Performed by: NURSE PRACTITIONER

## 2023-03-28 PROCEDURE — 3078F DIAST BP <80 MM HG: CPT | Mod: CPTII,S$GLB,, | Performed by: NURSE PRACTITIONER

## 2023-03-28 PROCEDURE — 1159F MED LIST DOCD IN RCRD: CPT | Mod: CPTII,S$GLB,, | Performed by: NURSE PRACTITIONER

## 2023-03-28 PROCEDURE — 1101F PT FALLS ASSESS-DOCD LE1/YR: CPT | Mod: CPTII,S$GLB,, | Performed by: NURSE PRACTITIONER

## 2023-03-28 PROCEDURE — 3074F SYST BP LT 130 MM HG: CPT | Mod: CPTII,S$GLB,, | Performed by: NURSE PRACTITIONER

## 2023-03-28 PROCEDURE — 99213 PR OFFICE/OUTPT VISIT, EST, LEVL III, 20-29 MIN: ICD-10-PCS | Mod: S$GLB,,, | Performed by: NURSE PRACTITIONER

## 2023-03-28 PROCEDURE — 3078F PR MOST RECENT DIASTOLIC BLOOD PRESSURE < 80 MM HG: ICD-10-PCS | Mod: CPTII,S$GLB,, | Performed by: NURSE PRACTITIONER

## 2023-03-28 PROCEDURE — 3008F BODY MASS INDEX DOCD: CPT | Mod: CPTII,S$GLB,, | Performed by: NURSE PRACTITIONER

## 2023-03-28 PROCEDURE — 3288F PR FALLS RISK ASSESSMENT DOCUMENTED: ICD-10-PCS | Mod: CPTII,S$GLB,, | Performed by: NURSE PRACTITIONER

## 2023-03-28 PROCEDURE — 1160F RVW MEDS BY RX/DR IN RCRD: CPT | Mod: CPTII,S$GLB,, | Performed by: NURSE PRACTITIONER

## 2023-03-28 PROCEDURE — 1159F PR MEDICATION LIST DOCUMENTED IN MEDICAL RECORD: ICD-10-PCS | Mod: CPTII,S$GLB,, | Performed by: NURSE PRACTITIONER

## 2023-03-28 PROCEDURE — 1160F PR REVIEW ALL MEDS BY PRESCRIBER/CLIN PHARMACIST DOCUMENTED: ICD-10-PCS | Mod: CPTII,S$GLB,, | Performed by: NURSE PRACTITIONER

## 2023-03-28 PROCEDURE — 3288F FALL RISK ASSESSMENT DOCD: CPT | Mod: CPTII,S$GLB,, | Performed by: NURSE PRACTITIONER

## 2023-03-28 PROCEDURE — 3008F PR BODY MASS INDEX (BMI) DOCUMENTED: ICD-10-PCS | Mod: CPTII,S$GLB,, | Performed by: NURSE PRACTITIONER

## 2023-03-28 RX ORDER — MECLIZINE HYDROCHLORIDE 25 MG/1
25 TABLET ORAL 3 TIMES DAILY PRN
Qty: 30 TABLET | Refills: 0 | Status: SHIPPED | OUTPATIENT
Start: 2023-03-28

## 2023-03-28 RX ORDER — NYSTATIN 100000 [USP'U]/ML
SUSPENSION ORAL
COMMUNITY
Start: 2023-03-02 | End: 2024-02-19

## 2023-03-28 RX ORDER — POLYETHYLENE GLYCOL 3350, SODIUM SULFATE, SODIUM CHLORIDE, POTASSIUM CHLORIDE, ASCORBIC ACID, SODIUM ASCORBATE 140-9-5.2G
KIT ORAL
COMMUNITY
Start: 2023-03-02

## 2023-03-28 NOTE — PROGRESS NOTES
Patient ID: Eboni Reeves is a 67 y.o. female.    Chief Complaint: Dizziness (No bottles// pt is here for dizziness x 3 days ago, dizziness last 10 to 20 min at a time//pt declined pna and flu vacc//pt is scheduling mammo//clyde)    Pt here sick visit.  Pt reports 3 nights ago when she got up in the middle of the night to urinate, while rising out of bed she developed sudden onset of dizziness, spinning type sensation.  States she laid back in bed and laying down seemed to worsen the dizzy feeling.  Laid there for about 15- 20 minutes before symptoms resolved and then she was slowly able to get up and walk to the bathroom.  The same symptoms have occurred the last 2 nights, usually triggered by rolling over in bed or getting out of bed.  Reports had not had much symptoms during the day.  States did have some mild ringing to her ears with dizziness, no nausea/ vomiting or diaphoresis.  No speech/vision change.  No headaches- denies any recent fever, chills, sore throat or cough.  Denies palpitations or chest pain.  Patient reports had episode of vertigo many years ago though not sure if these are similar symptoms          Past Medical History:   Diagnosis Date    Asthma     Cystitis, interstitial     DVT (deep venous thrombosis)     Encounter for blood transfusion     Fx     RIGHT ANKLE    Hyperlipidemia     LVH (left ventricular hypertrophy)     Renal disorder     STONE    Stroke     AGE 20    TMJ (dislocation of temporomandibular joint)     Urinary tract infection      Past Surgical History:   Procedure Laterality Date    APPENDECTOMY      BLADDER SURGERY      CYSTOURETEROSCOPY WITH RETROGRADE PYELOGRAPHY AND INSERTION OF STENT INTO URETER Right 5/5/2021    Procedure: CYSTOURETEROSCOPY, WITH RETROGRADE PYELOGRAM AND URETERAL STENT INSERTION;  Surgeon: Kelsey Winkler MD;  Location: [facility];  Service: Urology;  Laterality: Right;    HYSTERECTOMY      partial hysterectomy    LASER LITHOTRIPSY Right 5/5/2021     Procedure: LITHOTRIPSY, USING LASER;  Surgeon: Kelsey Winkler MD;  Location: [facility];  Service: Urology;  Laterality: Right;    MANDIBLE FRACTURE SURGERY      TONSILLECTOMY      WRIST SURGERY      right wrist, had plate put in         Tobacco History:  reports that she has never smoked. She has been exposed to tobacco smoke. She has never used smokeless tobacco.      Review of patient's allergies indicates:  No Known Allergies    Current Outpatient Medications:     fluticasone propionate (FLONASE) 50 mcg/actuation nasal spray, 1 spray (50 mcg total) by Each Nostril route once daily., Disp: 16 g, Rfl: 11    fluticasone-umeclidin-vilanter (TRELEGY ELLIPTA) 100-62.5-25 mcg DsDv, Inhale 1 puff into the lungs once daily., Disp: 180 each, Rfl: 3    lansoprazole (PREVACID) 30 MG capsule, Take 30 mg by mouth., Disp: , Rfl:     montelukast (SINGULAIR) 10 mg tablet, Take 1 tablet (10 mg total) by mouth every evening., Disp: 90 tablet, Rfl: 3    MYRBETRIQ 50 mg Tb24, Take 1 tablet (50 mg total) by mouth once daily., Disp: 90 tablet, Rfl: 1    albuterol (ACCUNEB) 1.25 mg/3 mL Nebu, Take 3 mLs (1.25 mg total) by nebulization every 6 (six) hours as needed (Shortness of breath, wheezing, cough). Rescue, Disp: 75 mL, Rfl: 11    albuterol (PROVENTIL/VENTOLIN HFA) 90 mcg/actuation inhaler, Inhale 2 puffs into the lungs every 4 (four) hours as needed for Wheezing or Shortness of Breath. Rescue, Disp: 18 g, Rfl: 11    azithromycin (Z-COREY) 250 MG tablet, 2 pills day one, then 1 pill for 4 days (Patient not taking: Reported on 3/28/2023), Disp: 6 tablet, Rfl: 0    azithromycin (ZITHROMAX) 500 MG tablet, Take 1 tablet (500 mg total) by mouth once daily. (Patient not taking: Reported on 3/28/2023), Disp: 3 tablet, Rfl: 1    meclizine (ANTIVERT) 25 mg tablet, Take 1 tablet (25 mg total) by mouth 3 (three) times daily as needed for Dizziness., Disp: 30 tablet, Rfl: 0    nystatin (MYCOSTATIN) 100,000 unit/mL suspension, Take by  mouth., Disp: , Rfl:     PLENVU 140-9-5.2 gram PPkS, Take as directed, Disp: , Rfl:     predniSONE (DELTASONE) 20 MG tablet, Take one pill per day for three days as needed for shortness of breath, wheezing, cough. May repeat as needed., Disp: 21 tablet, Rfl: 0    predniSONE (DELTASONE) 20 MG tablet, Take three pills per day for three days. Two pills per day for three days. One pill per day for three days., Disp: 18 tablet, Rfl: 0    progesterone (PROMETRIUM) 200 MG capsule, TAKE 1 CAPSULE BY MOUTH ONCE IN THE MORNING, Disp: , Rfl:     rosuvastatin (CRESTOR) 5 MG tablet, TAKE 1 TABLET (5 MG TOTAL) BY MOUTH ONCE DAILY., Disp: 30 tablet, Rfl: 11    spironolactone (ALDACTONE) 100 MG tablet, , Disp: , Rfl:     thyroid, pork, (ARMOUR THYROID) 30 mg Tab, 30 mg by Per G Tube route once daily., Disp: , Rfl:     Current Facility-Administered Medications:     acetaminophen tablet 650 mg, 650 mg, Oral, Once PRN, Denver Herrera PA-C    albuterol inhaler 2 puff, 2 puff, Inhalation, Q20 Min PRN, Denver Herrera PA-C    BUPivacaine injection 7.5 mg, 1.5 mL, Infiltration, 1 time in Clinic/HOD, Patrice Arredondo DPM    BUPivacaine injection 7.5 mg, 1.5 mL, Infiltration, 1 time in Clinic/HOD, Patrice Arredondo DPM    Review of Systems   Constitutional:  Negative for appetite change, chills and fever.   HENT:  Positive for tinnitus. Negative for ear pain, rhinorrhea, sore throat and trouble swallowing.    Eyes:  Negative for visual disturbance.   Respiratory:  Negative for cough (reports asthma stable), shortness of breath and wheezing.    Cardiovascular:  Negative for chest pain, palpitations and leg swelling.   Gastrointestinal:  Negative for abdominal pain, nausea and vomiting.   Genitourinary:  Negative for dysuria and hematuria.   Neurological:  Positive for dizziness. Negative for syncope, speech difficulty and headaches.        Objective:      Vitals:    03/28/23 1140   BP: 128/70   Pulse: 68   SpO2: 96%   Weight:  "77.9 kg (171 lb 12.8 oz)   Height: 5' 7" (1.702 m)     Physical Exam  Vitals reviewed.   Constitutional:       General: She is not in acute distress.     Appearance: Normal appearance. She is well-developed and normal weight.   HENT:      Head: Normocephalic and atraumatic.      Comments: Strongly + [place] Hallpike maneuveur     Right Ear: Tympanic membrane and ear canal normal.      Left Ear: Tympanic membrane and ear canal normal.      Mouth/Throat:      Pharynx: No posterior oropharyngeal erythema.   Neck:      Vascular: No carotid bruit.   Cardiovascular:      Rate and Rhythm: Normal rate and regular rhythm.      Heart sounds: No murmur heard.  Pulmonary:      Effort: Pulmonary effort is normal. No respiratory distress.      Breath sounds: Normal breath sounds. No wheezing or rales.   Abdominal:      General: There is no distension.      Palpations: Abdomen is soft.      Tenderness: There is no abdominal tenderness.   Musculoskeletal:      Cervical back: Neck supple.      Right lower leg: No edema.      Left lower leg: No edema.   Lymphadenopathy:      Cervical: No cervical adenopathy.   Skin:     General: Skin is warm and dry.      Findings: No rash.   Neurological:      General: No focal deficit present.      Mental Status: She is alert and oriented to person, place, and time.      Gait: Gait normal.   Psychiatric:         Mood and Affect: Mood normal.         Assessment:       1. Vertigo           Plan:       Vertigo  -strongly positive [place]-Hallpike Gaston maneuver.  Patient assisted to sitting position and symptoms resolved after several minutes.  Advised will prescribe meclizine and discussed Epley maneuver with handout provided.  Advised if symptoms do not improve recommend ENT  -     meclizine (ANTIVERT) 25 mg tablet; Take 1 tablet (25 mg total) by mouth 3 (three) times daily as needed for Dizziness.  Dispense: 30 tablet; Refill: 0      Follow up if symptoms worsen or fail to improve.        3/28/2023 Damari MCCRACKEN" KATIA Guerra

## 2023-04-14 DIAGNOSIS — Z12.31 OTHER SCREENING MAMMOGRAM: ICD-10-CM

## 2023-04-17 ENCOUNTER — PATIENT MESSAGE (OUTPATIENT)
Dept: ADMINISTRATIVE | Facility: HOSPITAL | Age: 68
End: 2023-04-17
Payer: MEDICARE

## 2023-04-28 ENCOUNTER — LAB VISIT (OUTPATIENT)
Dept: LAB | Facility: HOSPITAL | Age: 68
End: 2023-04-28
Attending: INTERNAL MEDICINE
Payer: MEDICARE

## 2023-04-28 DIAGNOSIS — J45.30 MILD PERSISTENT ASTHMA WITHOUT COMPLICATION: ICD-10-CM

## 2023-04-28 DIAGNOSIS — I49.9 VENTRICULAR ARRHYTHMIA: ICD-10-CM

## 2023-04-28 DIAGNOSIS — E78.00 HYPERCHOLESTEROLEMIA: ICD-10-CM

## 2023-04-28 LAB
ALBUMIN SERPL BCP-MCNC: 4.2 G/DL (ref 3.5–5.2)
ALP SERPL-CCNC: 56 U/L (ref 55–135)
ALT SERPL W/O P-5'-P-CCNC: 26 U/L (ref 10–44)
ANION GAP SERPL CALC-SCNC: 7 MMOL/L (ref 8–16)
AST SERPL-CCNC: 23 U/L (ref 10–40)
BILIRUB SERPL-MCNC: 0.7 MG/DL (ref 0.1–1)
BUN SERPL-MCNC: 22 MG/DL (ref 8–23)
CALCIUM SERPL-MCNC: 10.7 MG/DL (ref 8.7–10.5)
CHLORIDE SERPL-SCNC: 108 MMOL/L (ref 95–110)
CHOLEST SERPL-MCNC: 159 MG/DL (ref 120–199)
CHOLEST/HDLC SERPL: 2 {RATIO} (ref 2–5)
CO2 SERPL-SCNC: 23 MMOL/L (ref 23–29)
CREAT SERPL-MCNC: 0.8 MG/DL (ref 0.5–1.4)
ERYTHROCYTE [DISTWIDTH] IN BLOOD BY AUTOMATED COUNT: 13.6 % (ref 11.5–14.5)
EST. GFR  (NO RACE VARIABLE): >60 ML/MIN/1.73 M^2
GLUCOSE SERPL-MCNC: 108 MG/DL (ref 70–110)
HCT VFR BLD AUTO: 45.5 % (ref 37–48.5)
HDLC SERPL-MCNC: 79 MG/DL (ref 40–75)
HDLC SERPL: 49.7 % (ref 20–50)
HGB BLD-MCNC: 14.6 G/DL (ref 12–16)
LDLC SERPL CALC-MCNC: 61.4 MG/DL (ref 63–159)
MCH RBC QN AUTO: 29 PG (ref 27–31)
MCHC RBC AUTO-ENTMCNC: 32.1 G/DL (ref 32–36)
MCV RBC AUTO: 91 FL (ref 82–98)
NONHDLC SERPL-MCNC: 80 MG/DL
PLATELET # BLD AUTO: 345 K/UL (ref 150–450)
PMV BLD AUTO: 9.7 FL (ref 9.2–12.9)
POTASSIUM SERPL-SCNC: 4.3 MMOL/L (ref 3.5–5.1)
PROT SERPL-MCNC: 7.1 G/DL (ref 6–8.4)
RBC # BLD AUTO: 5.03 M/UL (ref 4–5.4)
SODIUM SERPL-SCNC: 138 MMOL/L (ref 136–145)
TRIGL SERPL-MCNC: 93 MG/DL (ref 30–150)
TSH SERPL DL<=0.005 MIU/L-ACNC: 1.44 UIU/ML (ref 0.34–5.6)
WBC # BLD AUTO: 7.32 K/UL (ref 3.9–12.7)

## 2023-04-28 PROCEDURE — 36415 COLL VENOUS BLD VENIPUNCTURE: CPT | Performed by: INTERNAL MEDICINE

## 2023-04-28 PROCEDURE — 85027 COMPLETE CBC AUTOMATED: CPT | Performed by: INTERNAL MEDICINE

## 2023-04-28 PROCEDURE — 80061 LIPID PANEL: CPT | Performed by: INTERNAL MEDICINE

## 2023-04-28 PROCEDURE — 84443 ASSAY THYROID STIM HORMONE: CPT | Performed by: INTERNAL MEDICINE

## 2023-04-28 PROCEDURE — 80053 COMPREHEN METABOLIC PANEL: CPT | Performed by: INTERNAL MEDICINE

## 2023-05-02 ENCOUNTER — OFFICE VISIT (OUTPATIENT)
Dept: PULMONOLOGY | Facility: CLINIC | Age: 68
End: 2023-05-02
Payer: MEDICARE

## 2023-05-02 ENCOUNTER — LAB VISIT (OUTPATIENT)
Dept: LAB | Facility: HOSPITAL | Age: 68
End: 2023-05-02
Attending: NURSE PRACTITIONER
Payer: MEDICARE

## 2023-05-02 ENCOUNTER — OFFICE VISIT (OUTPATIENT)
Dept: CARDIOLOGY | Facility: CLINIC | Age: 68
End: 2023-05-02
Payer: MEDICARE

## 2023-05-02 VITALS
WEIGHT: 172.38 LBS | OXYGEN SATURATION: 96 % | DIASTOLIC BLOOD PRESSURE: 77 MMHG | HEIGHT: 67 IN | HEART RATE: 80 BPM | SYSTOLIC BLOOD PRESSURE: 128 MMHG | BODY MASS INDEX: 27.06 KG/M2

## 2023-05-02 VITALS
SYSTOLIC BLOOD PRESSURE: 120 MMHG | HEART RATE: 67 BPM | DIASTOLIC BLOOD PRESSURE: 78 MMHG | HEIGHT: 67 IN | WEIGHT: 172 LBS | OXYGEN SATURATION: 98 % | BODY MASS INDEX: 27 KG/M2

## 2023-05-02 DIAGNOSIS — I49.9 VENTRICULAR ARRHYTHMIA: ICD-10-CM

## 2023-05-02 DIAGNOSIS — E78.00 HYPERCHOLESTEROLEMIA: Primary | ICD-10-CM

## 2023-05-02 DIAGNOSIS — R93.1 ELEVATED CORONARY ARTERY CALCIUM SCORE: ICD-10-CM

## 2023-05-02 DIAGNOSIS — J45.50 SEVERE PERSISTENT ASTHMA WITHOUT COMPLICATION: Primary | ICD-10-CM

## 2023-05-02 DIAGNOSIS — J45.30 MILD PERSISTENT ASTHMA WITHOUT COMPLICATION: ICD-10-CM

## 2023-05-02 DIAGNOSIS — J45.51 SEVERE PERSISTENT ASTHMA WITH EXACERBATION: ICD-10-CM

## 2023-05-02 DIAGNOSIS — G47.33 OBSTRUCTIVE SLEEP APNEA: ICD-10-CM

## 2023-05-02 LAB
BASOPHILS # BLD AUTO: 0.08 K/UL (ref 0–0.2)
BASOPHILS NFR BLD: 0.9 % (ref 0–1.9)
DIFFERENTIAL METHOD: NORMAL
EOSINOPHIL # BLD AUTO: 0.1 K/UL (ref 0–0.5)
EOSINOPHIL NFR BLD: 1.4 % (ref 0–8)
ERYTHROCYTE [DISTWIDTH] IN BLOOD BY AUTOMATED COUNT: 13.4 % (ref 11.5–14.5)
HCT VFR BLD AUTO: 42.6 % (ref 37–48.5)
HGB BLD-MCNC: 13.8 G/DL (ref 12–16)
IMM GRANULOCYTES # BLD AUTO: 0.02 K/UL (ref 0–0.04)
IMM GRANULOCYTES NFR BLD AUTO: 0.2 % (ref 0–0.5)
LYMPHOCYTES # BLD AUTO: 2.1 K/UL (ref 1–4.8)
LYMPHOCYTES NFR BLD: 24.3 % (ref 18–48)
MCH RBC QN AUTO: 29.3 PG (ref 27–31)
MCHC RBC AUTO-ENTMCNC: 32.4 G/DL (ref 32–36)
MCV RBC AUTO: 90 FL (ref 82–98)
MONOCYTES # BLD AUTO: 0.9 K/UL (ref 0.3–1)
MONOCYTES NFR BLD: 10.5 % (ref 4–15)
NEUTROPHILS # BLD AUTO: 5.5 K/UL (ref 1.8–7.7)
NEUTROPHILS NFR BLD: 62.7 % (ref 38–73)
NRBC BLD-RTO: 0 /100 WBC
PLATELET # BLD AUTO: 370 K/UL (ref 150–450)
PMV BLD AUTO: 10 FL (ref 9.2–12.9)
RBC # BLD AUTO: 4.71 M/UL (ref 4–5.4)
WBC # BLD AUTO: 8.77 K/UL (ref 3.9–12.7)

## 2023-05-02 PROCEDURE — 99999 PR PBB SHADOW E&M-EST. PATIENT-LVL IV: CPT | Mod: PBBFAC,,, | Performed by: NURSE PRACTITIONER

## 2023-05-02 PROCEDURE — 3008F PR BODY MASS INDEX (BMI) DOCUMENTED: ICD-10-PCS | Mod: CPTII,S$GLB,, | Performed by: NURSE PRACTITIONER

## 2023-05-02 PROCEDURE — 36415 COLL VENOUS BLD VENIPUNCTURE: CPT | Performed by: NURSE PRACTITIONER

## 2023-05-02 PROCEDURE — 1126F PR PAIN SEVERITY QUANTIFIED, NO PAIN PRESENT: ICD-10-PCS | Mod: CPTII,S$GLB,, | Performed by: NURSE PRACTITIONER

## 2023-05-02 PROCEDURE — 3074F SYST BP LT 130 MM HG: CPT | Mod: CPTII,S$GLB,, | Performed by: NURSE PRACTITIONER

## 2023-05-02 PROCEDURE — 3074F SYST BP LT 130 MM HG: CPT | Mod: CPTII,S$GLB,, | Performed by: INTERNAL MEDICINE

## 2023-05-02 PROCEDURE — 1159F MED LIST DOCD IN RCRD: CPT | Mod: CPTII,S$GLB,, | Performed by: INTERNAL MEDICINE

## 2023-05-02 PROCEDURE — 1160F RVW MEDS BY RX/DR IN RCRD: CPT | Mod: CPTII,S$GLB,, | Performed by: INTERNAL MEDICINE

## 2023-05-02 PROCEDURE — 1101F PT FALLS ASSESS-DOCD LE1/YR: CPT | Mod: CPTII,S$GLB,, | Performed by: INTERNAL MEDICINE

## 2023-05-02 PROCEDURE — 3078F PR MOST RECENT DIASTOLIC BLOOD PRESSURE < 80 MM HG: ICD-10-PCS | Mod: CPTII,S$GLB,, | Performed by: NURSE PRACTITIONER

## 2023-05-02 PROCEDURE — 99999 PR PBB SHADOW E&M-EST. PATIENT-LVL IV: ICD-10-PCS | Mod: PBBFAC,,, | Performed by: NURSE PRACTITIONER

## 2023-05-02 PROCEDURE — 3078F DIAST BP <80 MM HG: CPT | Mod: CPTII,S$GLB,, | Performed by: INTERNAL MEDICINE

## 2023-05-02 PROCEDURE — 99213 OFFICE O/P EST LOW 20 MIN: CPT | Mod: S$GLB,,, | Performed by: NURSE PRACTITIONER

## 2023-05-02 PROCEDURE — 99213 PR OFFICE/OUTPT VISIT, EST, LEVL III, 20-29 MIN: ICD-10-PCS | Mod: S$GLB,,, | Performed by: NURSE PRACTITIONER

## 2023-05-02 PROCEDURE — 3078F DIAST BP <80 MM HG: CPT | Mod: CPTII,S$GLB,, | Performed by: NURSE PRACTITIONER

## 2023-05-02 PROCEDURE — 1101F PR PT FALLS ASSESS DOC 0-1 FALLS W/OUT INJ PAST YR: ICD-10-PCS | Mod: CPTII,S$GLB,, | Performed by: NURSE PRACTITIONER

## 2023-05-02 PROCEDURE — 1126F AMNT PAIN NOTED NONE PRSNT: CPT | Mod: CPTII,S$GLB,, | Performed by: INTERNAL MEDICINE

## 2023-05-02 PROCEDURE — 99212 OFFICE O/P EST SF 10 MIN: CPT | Mod: S$GLB,,, | Performed by: INTERNAL MEDICINE

## 2023-05-02 PROCEDURE — 1126F PR PAIN SEVERITY QUANTIFIED, NO PAIN PRESENT: ICD-10-PCS | Mod: CPTII,S$GLB,, | Performed by: INTERNAL MEDICINE

## 2023-05-02 PROCEDURE — 3288F PR FALLS RISK ASSESSMENT DOCUMENTED: ICD-10-PCS | Mod: CPTII,S$GLB,, | Performed by: INTERNAL MEDICINE

## 2023-05-02 PROCEDURE — 3008F PR BODY MASS INDEX (BMI) DOCUMENTED: ICD-10-PCS | Mod: CPTII,S$GLB,, | Performed by: INTERNAL MEDICINE

## 2023-05-02 PROCEDURE — 3078F PR MOST RECENT DIASTOLIC BLOOD PRESSURE < 80 MM HG: ICD-10-PCS | Mod: CPTII,S$GLB,, | Performed by: INTERNAL MEDICINE

## 2023-05-02 PROCEDURE — 3288F FALL RISK ASSESSMENT DOCD: CPT | Mod: CPTII,S$GLB,, | Performed by: NURSE PRACTITIONER

## 2023-05-02 PROCEDURE — 3008F BODY MASS INDEX DOCD: CPT | Mod: CPTII,S$GLB,, | Performed by: INTERNAL MEDICINE

## 2023-05-02 PROCEDURE — 3008F BODY MASS INDEX DOCD: CPT | Mod: CPTII,S$GLB,, | Performed by: NURSE PRACTITIONER

## 2023-05-02 PROCEDURE — 85025 COMPLETE CBC W/AUTO DIFF WBC: CPT | Performed by: NURSE PRACTITIONER

## 2023-05-02 PROCEDURE — 1101F PT FALLS ASSESS-DOCD LE1/YR: CPT | Mod: CPTII,S$GLB,, | Performed by: NURSE PRACTITIONER

## 2023-05-02 PROCEDURE — 1126F AMNT PAIN NOTED NONE PRSNT: CPT | Mod: CPTII,S$GLB,, | Performed by: NURSE PRACTITIONER

## 2023-05-02 PROCEDURE — 3074F PR MOST RECENT SYSTOLIC BLOOD PRESSURE < 130 MM HG: ICD-10-PCS | Mod: CPTII,S$GLB,, | Performed by: INTERNAL MEDICINE

## 2023-05-02 PROCEDURE — 1159F PR MEDICATION LIST DOCUMENTED IN MEDICAL RECORD: ICD-10-PCS | Mod: CPTII,S$GLB,, | Performed by: INTERNAL MEDICINE

## 2023-05-02 PROCEDURE — 1159F MED LIST DOCD IN RCRD: CPT | Mod: CPTII,S$GLB,, | Performed by: NURSE PRACTITIONER

## 2023-05-02 PROCEDURE — 3288F PR FALLS RISK ASSESSMENT DOCUMENTED: ICD-10-PCS | Mod: CPTII,S$GLB,, | Performed by: NURSE PRACTITIONER

## 2023-05-02 PROCEDURE — 1159F PR MEDICATION LIST DOCUMENTED IN MEDICAL RECORD: ICD-10-PCS | Mod: CPTII,S$GLB,, | Performed by: NURSE PRACTITIONER

## 2023-05-02 PROCEDURE — 1101F PR PT FALLS ASSESS DOC 0-1 FALLS W/OUT INJ PAST YR: ICD-10-PCS | Mod: CPTII,S$GLB,, | Performed by: INTERNAL MEDICINE

## 2023-05-02 PROCEDURE — 99212 PR OFFICE/OUTPT VISIT, EST, LEVL II, 10-19 MIN: ICD-10-PCS | Mod: S$GLB,,, | Performed by: INTERNAL MEDICINE

## 2023-05-02 PROCEDURE — 1160F PR REVIEW ALL MEDS BY PRESCRIBER/CLIN PHARMACIST DOCUMENTED: ICD-10-PCS | Mod: CPTII,S$GLB,, | Performed by: INTERNAL MEDICINE

## 2023-05-02 PROCEDURE — 3288F FALL RISK ASSESSMENT DOCD: CPT | Mod: CPTII,S$GLB,, | Performed by: INTERNAL MEDICINE

## 2023-05-02 PROCEDURE — 3074F PR MOST RECENT SYSTOLIC BLOOD PRESSURE < 130 MM HG: ICD-10-PCS | Mod: CPTII,S$GLB,, | Performed by: NURSE PRACTITIONER

## 2023-05-02 RX ORDER — BENRALIZUMAB 30 MG/ML
1 INJECTION, SOLUTION SUBCUTANEOUS
Qty: 1 ML | Refills: 5 | Status: ACTIVE | OUTPATIENT
Start: 2023-05-02

## 2023-05-02 RX ORDER — BENRALIZUMAB 30 MG/ML
1 INJECTION, SOLUTION SUBCUTANEOUS
Qty: 1 ML | Refills: 1 | Status: ACTIVE | OUTPATIENT
Start: 2023-05-02 | End: 2023-11-29

## 2023-05-02 NOTE — PROGRESS NOTES
Patient ID:  Eboni Reeves is a 67 y.o. female who presents for follow-up of Hyperlipidemia and Results (labs)      She has been doing well denies any chest pains any shortness of breath.  Her asthma has been doing fairly well      Past Medical History:   Diagnosis Date    Asthma     Cystitis, interstitial     DVT (deep venous thrombosis)     Encounter for blood transfusion     Fx     RIGHT ANKLE    Hyperlipidemia     LVH (left ventricular hypertrophy)     Renal disorder     STONE    Stroke     AGE 20    TMJ (dislocation of temporomandibular joint)     Urinary tract infection         Past Surgical History:   Procedure Laterality Date    APPENDECTOMY      BLADDER SURGERY      CYSTOURETEROSCOPY WITH RETROGRADE PYELOGRAPHY AND INSERTION OF STENT INTO URETER Right 5/5/2021    Procedure: CYSTOURETEROSCOPY, WITH RETROGRADE PYELOGRAM AND URETERAL STENT INSERTION;  Surgeon: Kelsey Winkler MD;  Location: [facility];  Service: Urology;  Laterality: Right;    HYSTERECTOMY      partial hysterectomy    LASER LITHOTRIPSY Right 5/5/2021    Procedure: LITHOTRIPSY, USING LASER;  Surgeon: Kelsey Winkler MD;  Location: [facility] OR;  Service: Urology;  Laterality: Right;    MANDIBLE FRACTURE SURGERY      TONSILLECTOMY      WRIST SURGERY      right wrist, had plate put in          Current Outpatient Medications   Medication Instructions    albuterol (ACCUNEB) 1.25 mg, Nebulization, Every 6 hours PRN, Rescue    albuterol (PROVENTIL/VENTOLIN HFA) 90 mcg/actuation inhaler 2 puffs, Inhalation, Every 4 hours PRN, Rescue     fluticasone propionate (FLONASE) 50 mcg, Each Nostril, Daily    fluticasone-umeclidin-vilanter (TRELEGY ELLIPTA) 100-62.5-25 mcg DsDv 1 puff, Inhalation, Daily    lansoprazole (PREVACID) 30 mg, Oral    meclizine (ANTIVERT) 25 mg, Oral, 3 times daily PRN    montelukast (SINGULAIR) 10 mg, Oral, Nightly    MYRBETRIQ 50 mg, Oral, Daily    nystatin (MYCOSTATIN) 100,000 unit/mL suspension Oral    PLENVU  "140-9-5.2 gram PPkS Take as directed    predniSONE (DELTASONE) 20 MG tablet Take one pill per day for three days as needed for shortness of breath, wheezing, cough. May repeat as needed.    predniSONE (DELTASONE) 20 MG tablet Take three pills per day for three days. Two pills per day for three days. One pill per day for three days.    progesterone (PROMETRIUM) 200 MG capsule TAKE 1 CAPSULE BY MOUTH ONCE IN THE MORNING    rosuvastatin (CRESTOR) 5 mg, Oral, Daily    spironolactone (ALDACTONE) 100 MG tablet No dose, route, or frequency recorded.    thyroid (pork) (ARMOUR THYROID) 30 mg, Per G Tube, Daily        Review of patient's allergies indicates:  No Known Allergies     Review of Systems   Cardiovascular:  Negative for chest pain, dyspnea on exertion and palpitations.   Respiratory:  Negative for cough and shortness of breath.       Objective:     Vitals:    05/02/23 1049   BP: 120/78   BP Location: Left arm   Patient Position: Sitting   BP Method: Medium (Manual)   Pulse: 67   SpO2: 98%   Weight: 78 kg (172 lb)   Height: 5' 7" (1.702 m)       Physical Exam  Vitals and nursing note reviewed.   Constitutional:       Appearance: She is well-developed.   HENT:      Head: Normocephalic and atraumatic.   Eyes:      Conjunctiva/sclera: Conjunctivae normal.   Cardiovascular:      Rate and Rhythm: Normal rate and regular rhythm.      Heart sounds: Normal heart sounds.   Pulmonary:      Effort: Pulmonary effort is normal.      Breath sounds: Normal breath sounds.   Abdominal:      General: Bowel sounds are normal.      Palpations: Abdomen is soft.   Musculoskeletal:         General: Normal range of motion.   Skin:     General: Skin is warm and dry.   Neurological:      Mental Status: She is alert and oriented to person, place, and time.   Psychiatric:         Behavior: Behavior normal.         Thought Content: Thought content normal.         Judgment: Judgment normal.     CMP  Sodium   Date Value Ref Range Status "   04/28/2023 138 136 - 145 mmol/L Final   01/11/2017 140 134 - 144 mmol/L      Potassium   Date Value Ref Range Status   04/28/2023 4.3 3.5 - 5.1 mmol/L Final     Chloride   Date Value Ref Range Status   04/28/2023 108 95 - 110 mmol/L Final   01/11/2017 104 98 - 110 mmol/L      CO2   Date Value Ref Range Status   04/28/2023 23 23 - 29 mmol/L Final     Glucose   Date Value Ref Range Status   04/28/2023 108 70 - 110 mg/dL Final   01/11/2017 87 70 - 99 mg/dL      BUN   Date Value Ref Range Status   04/28/2023 22 8 - 23 mg/dL Final     Creatinine   Date Value Ref Range Status   04/28/2023 0.8 0.5 - 1.4 mg/dL Final   01/11/2017 0.76 0.60 - 1.40 mg/dL      Calcium   Date Value Ref Range Status   04/28/2023 10.7 (H) 8.7 - 10.5 mg/dL Final     Total Protein   Date Value Ref Range Status   04/28/2023 7.1 6.0 - 8.4 g/dL Final     Albumin   Date Value Ref Range Status   04/28/2023 4.2 3.5 - 5.2 g/dL Final   01/11/2017 4.4 3.1 - 4.7 g/dL      Total Bilirubin   Date Value Ref Range Status   04/28/2023 0.7 0.1 - 1.0 mg/dL Final     Comment:     For infants and newborns, interpretation of results should be based  on gestational age, weight and in agreement with clinical  observations.    Premature Infant recommended reference ranges:  Up to 24 hours.............<8.0 mg/dL  Up to 48 hours............<12.0 mg/dL  3-5 days..................<15.0 mg/dL  6-29 days.................<15.0 mg/dL       Alkaline Phosphatase   Date Value Ref Range Status   04/28/2023 56 55 - 135 U/L Final     AST   Date Value Ref Range Status   04/28/2023 23 10 - 40 U/L Final     ALT   Date Value Ref Range Status   04/28/2023 26 10 - 44 U/L Final     Anion Gap   Date Value Ref Range Status   04/28/2023 7 (L) 8 - 16 mmol/L Final     eGFR if    Date Value Ref Range Status   09/30/2021 >60.0 >60 mL/min/1.73 m^2 Final     eGFR if non    Date Value Ref Range Status   09/30/2021 >60.0 >60 mL/min/1.73 m^2 Final     Comment:      Calculation used to obtain the estimated glomerular filtration  rate (eGFR) is the CKD-EPI equation.         BMP  Lab Results   Component Value Date     04/28/2023    K 4.3 04/28/2023     04/28/2023    CO2 23 04/28/2023    BUN 22 04/28/2023    CREATININE 0.8 04/28/2023    CALCIUM 10.7 (H) 04/28/2023    ANIONGAP 7 (L) 04/28/2023    ESTGFRAFRICA >60.0 09/30/2021    EGFRNONAA >60.0 09/30/2021      BNP  @LABRCNTIP(BNP,BNPTRIAGEBLO)@   Lab Results   Component Value Date    CHOL 159 04/28/2023    CHOL 216 (H) 09/30/2021    CHOL 233 (H) 03/24/2021     Lab Results   Component Value Date    HDL 79 (H) 04/28/2023    HDL 81 (H) 09/30/2021    HDL 88 03/24/2021     Lab Results   Component Value Date    LDLCALC 61.4 (L) 04/28/2023    LDLCALC 120.2 09/30/2021    LDLCALC 132 (H) 03/24/2021     Lab Results   Component Value Date    TRIG 93 04/28/2023    TRIG 74 09/30/2021    TRIG 74 03/24/2021     Lab Results   Component Value Date    CHOLHDL 49.7 04/28/2023    CHOLHDL 37.5 09/30/2021      Lab Results   Component Value Date    TSH 1.440 04/28/2023     No results found for: LABA1C, HGBA1C  Lab Results   Component Value Date    WBC 7.32 04/28/2023    HGB 14.6 04/28/2023    HCT 45.5 04/28/2023    MCV 91 04/28/2023     04/28/2023         No results found for this or any previous visit.     No results found for this or any previous visit.         Assessment:       Hypercholesterolemia  Controlled on 5 mg of rosuvastatin    Elevated coronary artery calcium score  On a statin therapy    Mild persistent asthma without complication  Controlled with medications       Plan:       Continue the current medical therapy return to the office in 6 months with a lipid and CMP.
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
## 2023-05-02 NOTE — PATIENT INSTRUCTIONS
Continue current medication regiment    Will start new medication Fasenra, expect phone call from [facility]

## 2023-05-05 ENCOUNTER — TELEPHONE (OUTPATIENT)
Dept: PHARMACY | Facility: CLINIC | Age: 68
End: 2023-05-05
Payer: MEDICARE

## 2023-05-05 NOTE — TELEPHONE ENCOUNTER
Lorelei, this is Aarti England, clinical pharmacist with [facility] that is part of your care team.  We have begun working on your prescription that your doctor has sent us. Our next steps include:     Working with your insurance company to obtain approval for your medication  Working with you to ensure your medication is affordable     We will be calling you along the way with updates on your medication but if you have any concerns or receive information that you would like to discuss please reach us at [phone].    Welcome call outcome: Patient/caregiver reached

## 2023-05-10 ENCOUNTER — PATIENT MESSAGE (OUTPATIENT)
Dept: PULMONOLOGY | Facility: CLINIC | Age: 68
End: 2023-05-10
Payer: MEDICARE

## 2023-05-12 ENCOUNTER — TELEPHONE (OUTPATIENT)
Dept: PULMONOLOGY | Facility: CLINIC | Age: 68
End: 2023-05-12
Payer: MEDICARE

## 2023-05-12 ENCOUNTER — SPECIALTY PHARMACY (OUTPATIENT)
Dept: PHARMACY | Facility: CLINIC | Age: 68
End: 2023-05-12
Payer: MEDICARE

## 2023-05-18 ENCOUNTER — PATIENT MESSAGE (OUTPATIENT)
Dept: FAMILY MEDICINE | Facility: CLINIC | Age: 68
End: 2023-05-18

## 2023-05-22 RX ORDER — CLOTRIMAZOLE AND BETAMETHASONE DIPROPIONATE 10; .64 MG/G; MG/G
CREAM TOPICAL 2 TIMES DAILY
Qty: 45 G | Refills: 1 | Status: SHIPPED | OUTPATIENT
Start: 2023-05-22

## 2023-05-23 ENCOUNTER — SPECIALTY PHARMACY (OUTPATIENT)
Dept: PHARMACY | Facility: CLINIC | Age: 68
End: 2023-05-23
Payer: MEDICARE

## 2023-05-23 DIAGNOSIS — J45.30 MILD PERSISTENT ASTHMA WITHOUT COMPLICATION: Primary | ICD-10-CM

## 2023-05-24 NOTE — TELEPHONE ENCOUNTER
Specialty Pharmacy - Initial Clinical Assessment    Specialty Medication Orders Linked to Encounter      Flowsheet Row Most Recent Value   Medication #1 benralizumab (FASENRA PEN) 30 mg/mL AtIn (Order#[id number], Rx#[id number])     1st loading dose in office on 5/2  2nd loading dose scheduled for 5/30     Patient Diagnosis   J45.30 - Mild persistent asthma without complication    Subjective    Eboni Reeves is a 67 y.o. female, who is followed by the specialty pharmacy service for management and education.    Recent Encounters       Date Type Provider Description    05/23/2023 Specialty Pharmacy Aarti England, [place] Initial Clinical Assessment    05/12/2023 Specialty Pharmacy Aarti England, StephanieD Referral Authorization            Current Outpatient Medications   Medication Sig    albuterol (ACCUNEB) 1.25 mg/3 mL Nebu Take 3 mLs (1.25 mg total) by nebulization every 6 (six) hours as needed (Shortness of breath, wheezing, cough). Rescue    albuterol (PROVENTIL/VENTOLIN HFA) 90 mcg/actuation inhaler Inhale 2 puffs into the lungs every 4 (four) hours as needed for Wheezing or Shortness of Breath. Rescue    benralizumab (FASENRA PEN) 30 mg/mL AtIn Inject 1 mL into the skin every 28 days.    benralizumab (FASENRA PEN) 30 mg/mL AtIn Inject 1 mL into the skin every 8 weeks.    clotrimazole-betamethasone 1-0.05% (LOTRISONE) cream Apply topically 2 (two) times daily.    fluticasone propionate (FLONASE) 50 mcg/actuation nasal spray 1 spray (50 mcg total) by Each Nostril route once daily.    fluticasone-umeclidin-vilanter (TRELEGY ELLIPTA) 100-62.5-25 mcg DsDv Inhale 1 puff into the lungs once daily.    lansoprazole (PREVACID) 30 MG capsule Take 30 mg by mouth.    meclizine (ANTIVERT) 25 mg tablet Take 1 tablet (25 mg total) by mouth 3 (three) times daily as needed for Dizziness.    montelukast (SINGULAIR) 10 mg tablet Take 1 tablet (10 mg total) by mouth every evening.    MYRBETRIQ 50 mg Tb24 Take 1 tablet (50 mg total) by  mouth once daily.    nystatin (MYCOSTATIN) 100,000 unit/mL suspension Take by mouth.    PLENVU 140-9-5.2 gram PPkS Take as directed    predniSONE (DELTASONE) 20 MG tablet Take one pill per day for three days as needed for shortness of breath, wheezing, cough. May repeat as needed.    predniSONE (DELTASONE) 20 MG tablet Take three pills per day for three days. Two pills per day for three days. One pill per day for three days.    progesterone (PROMETRIUM) 200 MG capsule TAKE 1 CAPSULE BY MOUTH ONCE IN THE MORNING    rosuvastatin (CRESTOR) 5 MG tablet TAKE 1 TABLET (5 MG TOTAL) BY MOUTH ONCE DAILY.    spironolactone (ALDACTONE) 100 MG tablet     thyroid, pork, (ARMOUR THYROID) 30 mg Tab 30 mg by Per G Tube route once daily.   Last reviewed on 5/24/2023  4:01 PM by Aarti England, PharmD    Review of patient's allergies indicates:  No Known AllergiesLast reviewed on  5/24/2023 3:45 PM by Aarti England    Drug Interactions    Drug interactions evaluated: yes  Clinically relevant drug interactions identified: no  Provided the patient with educational material regarding drug interactions: not applicable         Adverse Effects    *All other systems reviewed and are negative       Assessment Questions - Documented Responses      Flowsheet Row Most Recent Value   Assessment    Medication Reconciliation completed for patient Yes   During the past 4 weeks, has patient missed any activities due to condition or medication? No   During the past 4 weeks, did patient have any of the following urgent care visits? None   Goals of Therapy Status Discussed (new start)   Status of the patients ability to self-administer: Is Able   All education points have been covered with patient? Yes, supplemental printed education provided   Welcome packet contents reviewed and discussed with patient? Yes   Assesment completed? Yes   Plan Therapy being initiated   Do you need to open a clinical intervention (i-vent)? No   Do you want to schedule first  "shipment? Yes   Medication #1 Assessment Info    Patient status New medication, New to [facility]   Is this medication appropriate for the patient? Yes   Is this medication effective? Not yet started          Refill Questions - Documented Responses      Flowsheet Row Most Recent Value   Refill Screening Questions    When does the patient need to receive the medication? 05/30/23   Refill Delivery Questions    How will the patient receive the medication? MEDRx   When does the patient need to receive the medication? 05/30/23   Shipping Address Home   Address in [facility] confirmed and updated if neccessary? Yes   Expected Copay ($) 60   Is the patient able to afford the medication copay? Yes   Payment Method new CC added to file   Days supply of Refill 28   Supplies needed? No supplies needed   Refill activity completed? Yes   Refill activity plan Refill scheduled   Shipment/Pickup Date: 05/25/23            Objective    She has a past medical history of Asthma, Cystitis, interstitial, DVT (deep venous thrombosis), Encounter for blood transfusion, Fx, Hyperlipidemia, LVH (left ventricular hypertrophy), Renal disorder, Stroke, TMJ (dislocation of temporomandibular joint), and Urinary tract infection.    Tried/failed medications: Albuterol, Trelegy, Singulair, Prednisone    BP Readings from Last 4 Encounters:   05/02/23 128/77   05/02/23 120/78   03/28/23 128/70   01/24/23 133/75     Ht Readings from Last 4 Encounters:   05/02/23 5' 7" (1.702 m)   05/02/23 5' 7" (1.702 m)   03/28/23 5' 7" (1.702 m)   01/24/23 5' 7" (1.702 m)     Wt Readings from Last 4 Encounters:   05/02/23 78.2 kg (172 lb 6.4 oz)   05/02/23 78 kg (172 lb)   03/28/23 77.9 kg (171 lb 12.8 oz)   01/24/23 76.3 kg (168 lb 3.4 oz)       The goals of prescribed drug therapy management include:  Supporting patient to meet the prescriber's medical treatment objectives  Improving or maintaining quality of life  Maintaining optimal therapy adherence  Minimizing " and managing side effects      Goals of Therapy Status: Discussed (new start)    Assessment/Plan  Patient plans to start therapy on 05/30/23      Indication, dosage, appropriateness, effectiveness, safety and convenience of her specialty medication(s) were reviewed today.     Patient Education   Patient received education on the following:   Expectations and possible outcomes of therapy  Proper use, timely administration, and missed dose management  Duration of therapy  Side effects, including prevention, minimization, and management  Contraindications and safety precautions  New or changed medications, including prescribe and over the counter medications and supplements  Reviews recommended vaccinations, as appropriate  Storage, safe handling, and disposal    2nd loading dose    Tasks added this encounter   No tasks added.   Tasks due within next 3 months   5/24/2023 - Initial Clinical Assessment/Patient Education (1 Time Occurence)  5/23/2023 - Set up Initial Fill     Aarti England, PharmD  [facility]  [address] [place] [id number]  Phone: [phone]  Fax: [phone]

## 2023-05-29 ENCOUNTER — HOSPITAL ENCOUNTER (OUTPATIENT)
Dept: RADIOLOGY | Facility: HOSPITAL | Age: 68
Discharge: HOME | End: 2023-05-29
Attending: FAMILY MEDICINE
Payer: MEDICARE

## 2023-05-29 VITALS — BODY MASS INDEX: 27.06 KG/M2 | WEIGHT: 172.38 LBS | HEIGHT: 67 IN

## 2023-05-29 DIAGNOSIS — Z12.31 OTHER SCREENING MAMMOGRAM: ICD-10-CM

## 2023-05-29 PROCEDURE — 77067 SCR MAMMO BI INCL CAD: CPT | Mod: TC,PO

## 2023-05-30 ENCOUNTER — TELEPHONE (OUTPATIENT)
Dept: FAMILY MEDICINE | Facility: CLINIC | Age: 68
End: 2023-05-30

## 2023-05-30 DIAGNOSIS — R92.8 ABNORMAL MAMMOGRAM: Primary | ICD-10-CM

## 2023-05-30 NOTE — PROGRESS NOTES
Call patient.  Mammogram shows some circumscribed nodules in the left breast.  Tiny microcalcifications also seen.  Radiologist recommends a diagnostic left breast mammogram to complete the evaluation.  We can set this up for you

## 2023-05-30 NOTE — TELEPHONE ENCOUNTER
----- Message from Domo Gonzalez MD sent at 5/30/2023  8:30 AM CDT -----  Call patient.  Mammogram shows some circumscribed nodules in the left breast.  Tiny microcalcifications also seen.  Radiologist recommends a diagnostic left breast mammogram to complete the evaluation.  We can set this up for you   stated

## 2023-05-30 NOTE — TELEPHONE ENCOUNTER
----- Message from Domo Gonzalez MD sent at 5/30/2023  8:30 AM CDT -----  Call patient.  Mammogram shows some circumscribed nodules in the left breast.  Tiny microcalcifications also seen.  Radiologist recommends a diagnostic left breast mammogram to complete the evaluation.  We can set this up for you

## 2023-05-30 NOTE — TELEPHONE ENCOUNTER
Spoke to patient with results verbatim per Dr Gonzalez. Verbalized understanding that [facility] should be calling in the next day or 2 to schedule additional views. Remind me created. Order pended.

## 2023-06-12 ENCOUNTER — TELEPHONE (OUTPATIENT)
Dept: FAMILY MEDICINE | Facility: CLINIC | Age: 68
End: 2023-06-12

## 2023-06-12 NOTE — TELEPHONE ENCOUNTER
----- Message from Sultana Florentino LPN sent at 6/12/2023 10:48 AM CDT -----  Regarding: FW: vertigo continues    ----- Message -----  From: Oralia Nolan MA  Sent: 6/12/2023   9:50 AM CDT  To: Damari Guerra NP  Subject: vertigo continues                                Complains of persistant vertigo. Would like to be seen asap  [phone]

## 2023-06-12 NOTE — TELEPHONE ENCOUNTER
Pt states she really wants to be seen. States vertigo is not getting any better. She can not tolerate meclizine. Pt scheduled for appt. Pt states she just want to come in to talk to codie.

## 2023-06-13 ENCOUNTER — OFFICE VISIT (OUTPATIENT)
Dept: FAMILY MEDICINE | Facility: CLINIC | Age: 68
End: 2023-06-13
Payer: MEDICARE

## 2023-06-13 VITALS
HEART RATE: 85 BPM | DIASTOLIC BLOOD PRESSURE: 88 MMHG | WEIGHT: 174.38 LBS | SYSTOLIC BLOOD PRESSURE: 132 MMHG | OXYGEN SATURATION: 97 % | BODY MASS INDEX: 27.37 KG/M2 | HEIGHT: 67 IN

## 2023-06-13 DIAGNOSIS — R42 VERTIGO: Primary | ICD-10-CM

## 2023-06-13 DIAGNOSIS — J45.50 SEVERE PERSISTENT ASTHMA WITHOUT COMPLICATION: ICD-10-CM

## 2023-06-13 PROCEDURE — 3079F PR MOST RECENT DIASTOLIC BLOOD PRESSURE 80-89 MM HG: ICD-10-PCS | Mod: CPTII,S$GLB,, | Performed by: NURSE PRACTITIONER

## 2023-06-13 PROCEDURE — 99213 OFFICE O/P EST LOW 20 MIN: CPT | Mod: S$GLB,,, | Performed by: NURSE PRACTITIONER

## 2023-06-13 PROCEDURE — 1101F PR PT FALLS ASSESS DOC 0-1 FALLS W/OUT INJ PAST YR: ICD-10-PCS | Mod: CPTII,S$GLB,, | Performed by: NURSE PRACTITIONER

## 2023-06-13 PROCEDURE — 3288F FALL RISK ASSESSMENT DOCD: CPT | Mod: CPTII,S$GLB,, | Performed by: NURSE PRACTITIONER

## 2023-06-13 PROCEDURE — 1159F PR MEDICATION LIST DOCUMENTED IN MEDICAL RECORD: ICD-10-PCS | Mod: CPTII,S$GLB,, | Performed by: NURSE PRACTITIONER

## 2023-06-13 PROCEDURE — 1160F RVW MEDS BY RX/DR IN RCRD: CPT | Mod: CPTII,S$GLB,, | Performed by: NURSE PRACTITIONER

## 2023-06-13 PROCEDURE — 3008F PR BODY MASS INDEX (BMI) DOCUMENTED: ICD-10-PCS | Mod: CPTII,S$GLB,, | Performed by: NURSE PRACTITIONER

## 2023-06-13 PROCEDURE — 1159F MED LIST DOCD IN RCRD: CPT | Mod: CPTII,S$GLB,, | Performed by: NURSE PRACTITIONER

## 2023-06-13 PROCEDURE — 3075F SYST BP GE 130 - 139MM HG: CPT | Mod: CPTII,S$GLB,, | Performed by: NURSE PRACTITIONER

## 2023-06-13 PROCEDURE — 1101F PT FALLS ASSESS-DOCD LE1/YR: CPT | Mod: CPTII,S$GLB,, | Performed by: NURSE PRACTITIONER

## 2023-06-13 PROCEDURE — 3008F BODY MASS INDEX DOCD: CPT | Mod: CPTII,S$GLB,, | Performed by: NURSE PRACTITIONER

## 2023-06-13 PROCEDURE — 3288F PR FALLS RISK ASSESSMENT DOCUMENTED: ICD-10-PCS | Mod: CPTII,S$GLB,, | Performed by: NURSE PRACTITIONER

## 2023-06-13 PROCEDURE — 3079F DIAST BP 80-89 MM HG: CPT | Mod: CPTII,S$GLB,, | Performed by: NURSE PRACTITIONER

## 2023-06-13 PROCEDURE — 1160F PR REVIEW ALL MEDS BY PRESCRIBER/CLIN PHARMACIST DOCUMENTED: ICD-10-PCS | Mod: CPTII,S$GLB,, | Performed by: NURSE PRACTITIONER

## 2023-06-13 PROCEDURE — 3075F PR MOST RECENT SYSTOLIC BLOOD PRESS GE 130-139MM HG: ICD-10-PCS | Mod: CPTII,S$GLB,, | Performed by: NURSE PRACTITIONER

## 2023-06-13 PROCEDURE — 99213 PR OFFICE/OUTPT VISIT, EST, LEVL III, 20-29 MIN: ICD-10-PCS | Mod: S$GLB,,, | Performed by: NURSE PRACTITIONER

## 2023-06-13 RX ORDER — BENZONATATE 100 MG/1
100 CAPSULE ORAL 3 TIMES DAILY PRN
Qty: 30 CAPSULE | Refills: 2 | Status: SHIPPED | OUTPATIENT
Start: 2023-06-13

## 2023-06-13 NOTE — PATIENT INSTRUCTIONS
Brandon Noe MD- ENT, call to schedule appointment  [address]  [facility]  [place] [address]  Phone: [phone]

## 2023-06-13 NOTE — PROGRESS NOTES
Patient ID: Eboni Reeves is a 67 y.o. female.    Chief Complaint: Dizziness (No bottles//Pt is here to discuss her intermittent dizziness, non productive cough and pressure in both ears x 7 days//ALEX )    Pt here for sick visit.  Pt reports she has continued to have some intermittent dizziness since last visit here. Patient seen March 28th with complaints of 3 days of dizziness, spinning type sensation.  Symptoms triggered by rolling over in bed or getting out of bed.  At visit patient had strongly positive Joy-Hallpike maneuver.  Meclizine prescribed but pt reports it doesn't seem to really help. Reports will still have the dizziness intermittently. Had it really bad the other day when hairdresser lowered her back to wash her hair. Notices brief dizziness/spinning when she gets in/out of bed at times. Reports ears feel blocked or popped at times. Denies palpitations, CP or SOB. No vision/speech change. Reports has not seen ENT recently, saw Dr. Alvarez years ago     Pt c/oing of dry hacking type cough the past couple weeks- saw pulmonary last month for asthma and started on fasenra. - reports wheezing has improved with addition of fasenra but still has the cough. Uses albuterol prn. No fever/chills    Had regular follow-up with Dr. Larose last month          Past Medical History:   Diagnosis Date    Asthma     Cystitis, interstitial     DVT (deep venous thrombosis)     Encounter for blood transfusion     Fx     RIGHT ANKLE    Hyperlipidemia     LVH (left ventricular hypertrophy)     Renal disorder     STONE    Stroke     AGE 20    TMJ (dislocation of temporomandibular joint)     Urinary tract infection      Past Surgical History:   Procedure Laterality Date    APPENDECTOMY      BLADDER SURGERY      CYSTOURETEROSCOPY WITH RETROGRADE PYELOGRAPHY AND INSERTION OF STENT INTO URETER Right 5/5/2021    Procedure: CYSTOURETEROSCOPY, WITH RETROGRADE PYELOGRAM AND URETERAL STENT INSERTION;  Surgeon: Kelsey ADAMS  MD Peterson;  Location: [facility] OR;  Service: Urology;  Laterality: Right;    HYSTERECTOMY      partial hysterectomy    LASER LITHOTRIPSY Right 5/5/2021    Procedure: LITHOTRIPSY, USING LASER;  Surgeon: Kelsey Winkler MD;  Location: [facility] OR;  Service: Urology;  Laterality: Right;    MANDIBLE FRACTURE SURGERY      TONSILLECTOMY      WRIST SURGERY      right wrist, had plate put in         Tobacco History:  reports that she has never smoked. She has been exposed to tobacco smoke. She has never used smokeless tobacco.      Review of patient's allergies indicates:  No Known Allergies    Current Outpatient Medications:     albuterol (ACCUNEB) 1.25 mg/3 mL Nebu, Take 3 mLs (1.25 mg total) by nebulization every 6 (six) hours as needed (Shortness of breath, wheezing, cough). Rescue, Disp: 75 mL, Rfl: 11    albuterol (PROVENTIL/VENTOLIN HFA) 90 mcg/actuation inhaler, Inhale 2 puffs into the lungs every 4 (four) hours as needed for Wheezing or Shortness of Breath. Rescue, Disp: 18 g, Rfl: 11    benralizumab (FASENRA PEN) 30 mg/mL AtIn, Inject 1 mL into the skin every 28 days., Disp: 1 mL, Rfl: 1    benralizumab (FASENRA PEN) 30 mg/mL AtIn, Inject 1 mL into the skin every 8 weeks., Disp: 1 each, Rfl: 5    benzonatate (TESSALON) 100 MG capsule, Take 1 capsule (100 mg total) by mouth 3 (three) times daily as needed for Cough., Disp: 30 capsule, Rfl: 2    clotrimazole-betamethasone 1-0.05% (LOTRISONE) cream, Apply topically 2 (two) times daily., Disp: 45 g, Rfl: 1    fluticasone propionate (FLONASE) 50 mcg/actuation nasal spray, 1 spray (50 mcg total) by Each Nostril route once daily., Disp: 16 g, Rfl: 11    fluticasone-umeclidin-vilanter (TRELEGY ELLIPTA) 100-62.5-25 mcg DsDv, Inhale 1 puff into the lungs once daily., Disp: 180 each, Rfl: 3    lansoprazole (PREVACID) 30 MG capsule, Take 30 mg by mouth., Disp: , Rfl:     meclizine (ANTIVERT) 25 mg tablet, Take 1 tablet (25 mg total) by mouth 3 (three) times daily as  needed for Dizziness., Disp: 30 tablet, Rfl: 0    montelukast (SINGULAIR) 10 mg tablet, Take 1 tablet (10 mg total) by mouth every evening., Disp: 90 tablet, Rfl: 3    MYRBETRIQ 50 mg Tb24, Take 1 tablet (50 mg total) by mouth once daily., Disp: 90 tablet, Rfl: 1    nystatin (MYCOSTATIN) 100,000 unit/mL suspension, Take by mouth., Disp: , Rfl:     PLENVU 140-9-5.2 gram PPkS, Take as directed, Disp: , Rfl:     predniSONE (DELTASONE) 20 MG tablet, Take one pill per day for three days as needed for shortness of breath, wheezing, cough. May repeat as needed., Disp: 21 tablet, Rfl: 0    predniSONE (DELTASONE) 20 MG tablet, Take three pills per day for three days. Two pills per day for three days. One pill per day for three days., Disp: 18 tablet, Rfl: 0    progesterone (PROMETRIUM) 200 MG capsule, TAKE 1 CAPSULE BY MOUTH ONCE IN THE MORNING, Disp: , Rfl:     rosuvastatin (CRESTOR) 5 MG tablet, TAKE 1 TABLET (5 MG TOTAL) BY MOUTH ONCE DAILY., Disp: 30 tablet, Rfl: 11    spironolactone (ALDACTONE) 100 MG tablet, , Disp: , Rfl:     thyroid, pork, (ARMOUR THYROID) 30 mg Tab, 30 mg by Per G Tube route once daily., Disp: , Rfl:     Current Facility-Administered Medications:     acetaminophen tablet 650 mg, 650 mg, Oral, Once PRN, Denver Herrera PA-C    albuterol inhaler 2 puff, 2 puff, Inhalation, Q20 Min PRN, Denver Herrera PA-C    BUPivacaine injection 7.5 mg, 1.5 mL, Infiltration, 1 time in Clinic/HOD, Patrice Arredondo DPM    BUPivacaine injection 7.5 mg, 1.5 mL, Infiltration, 1 time in Clinic/HOD, Patrice Arredondo DPM    Review of Systems   Constitutional:  Negative for appetite change, chills and fever.   HENT:  Positive for tinnitus. Negative for ear pain (ear popping or pressure), rhinorrhea, sore throat and trouble swallowing.    Eyes:  Negative for visual disturbance.   Respiratory:  Positive for cough. Negative for shortness of breath and wheezing.    Cardiovascular:  Negative for chest pain,  "palpitations and leg swelling.   Gastrointestinal:  Negative for abdominal pain, nausea and vomiting.   Genitourinary:  Negative for dysuria and hematuria.   Musculoskeletal:  Negative for neck pain.   Neurological:  Positive for dizziness (see HPI). Negative for syncope, speech difficulty and headaches.        Objective:      Vitals:    06/13/23 0813   BP: 132/88   Pulse: 85   SpO2: 97%   Weight: 79.1 kg (174 lb 6.4 oz)   Height: 5' 7" (1.702 m)     Physical Exam  Vitals reviewed.   Constitutional:       General: She is not in acute distress.     Appearance: Normal appearance. She is well-developed and normal weight.   HENT:      Head: Normocephalic and atraumatic.      Right Ear: There is impacted cerumen.      Left Ear: Tympanic membrane and ear canal normal.      Mouth/Throat:      Pharynx: No posterior oropharyngeal erythema.   Neck:      Vascular: No carotid bruit.   Cardiovascular:      Rate and Rhythm: Normal rate and regular rhythm.      Heart sounds: No murmur heard.  Pulmonary:      Effort: Pulmonary effort is normal. No respiratory distress.      Breath sounds: Normal breath sounds. No wheezing or rales.   Abdominal:      Palpations: Abdomen is soft.   Musculoskeletal:      Cervical back: Neck supple.      Right lower leg: No edema.      Left lower leg: No edema.   Lymphadenopathy:      Cervical: No cervical adenopathy.   Skin:     General: Skin is warm and dry.      Findings: No rash.   Neurological:      General: No focal deficit present.      Mental Status: She is alert and oriented to person, place, and time.      Gait: Gait normal.   Psychiatric:         Mood and Affect: Mood normal.         Assessment:       1. Vertigo    2. Severe persistent asthma without complication           Plan:       Vertigo  -pt advised I recommend she see ENT d/t ongoing vertigo issues. Asymptomatic today  -     Ambulatory referral/consult to ENT; Future; Expected date: 06/20/2023    Severe persistent asthma without " complication  -pt c/oing of dry cough, recently started on fasenra for asthma and on 2 antihistamines. No indication for abx. Advised if cough persists/worsens then recommend she take the prn prednisone as prescribed by pulm  -     benzonatate (TESSALON) 100 MG capsule; Take 1 capsule (100 mg total) by mouth 3 (three) times daily as needed for Cough.  Dispense: 30 capsule; Refill: 2      Follow up if symptoms worsen or fail to improve.        6/13/2023 Damari Guerra, NP

## 2023-06-15 ENCOUNTER — PATIENT MESSAGE (OUTPATIENT)
Dept: PHARMACY | Facility: CLINIC | Age: 68
End: 2023-06-15
Payer: MEDICARE

## 2023-06-16 ENCOUNTER — HOSPITAL ENCOUNTER (OUTPATIENT)
Dept: RADIOLOGY | Facility: HOSPITAL | Age: 68
Discharge: HOME | End: 2023-06-16
Attending: PHYSICIAN ASSISTANT
Payer: MEDICARE

## 2023-06-16 ENCOUNTER — TELEPHONE (OUTPATIENT)
Dept: FAMILY MEDICINE | Facility: CLINIC | Age: 68
End: 2023-06-16

## 2023-06-16 DIAGNOSIS — R92.8 ABNORMAL MAMMOGRAM: Primary | ICD-10-CM

## 2023-06-16 DIAGNOSIS — R92.8 ABNORMAL MAMMOGRAM: ICD-10-CM

## 2023-06-16 PROCEDURE — 77061 BREAST TOMOSYNTHESIS UNI: CPT | Mod: TC,PO,LT

## 2023-06-16 PROCEDURE — 76642 ULTRASOUND BREAST LIMITED: CPT | Mod: TC,PO,LT

## 2023-06-16 NOTE — TELEPHONE ENCOUNTER
----- Message from Denver Herrera PA-C sent at 6/16/2023 12:17 PM CDT -----  Probably benign findings on the ultrasound of the left breast but radiologist recommends short-term follow-up diagnostic mammogram with ultrasound in 6 months.  Please place this alert

## 2023-06-19 ENCOUNTER — TELEPHONE (OUTPATIENT)
Dept: FAMILY MEDICINE | Facility: CLINIC | Age: 68
End: 2023-06-19

## 2023-06-19 ENCOUNTER — SPECIALTY PHARMACY (OUTPATIENT)
Dept: PHARMACY | Facility: CLINIC | Age: 68
End: 2023-06-19
Payer: MEDICARE

## 2023-06-19 NOTE — TELEPHONE ENCOUNTER
----- Message from Kelsey Flores sent at 6/19/2023  1:12 PM CDT -----  - 11:42- pt is calling jenny back about the us results   [phone]

## 2023-06-19 NOTE — TELEPHONE ENCOUNTER
Spoke with pt already in regards to recent ultrasound results. Please see telephone encounter on 06/16/2023.

## 2023-06-19 NOTE — TELEPHONE ENCOUNTER
We can refer her to discuss further with Jasmina Funez with the high [facility] breast [facility] and she can discuss options available. Its difficult to get breast MRI covered sometimes.   Right IJ TLC Right Femoral Arterial line

## 2023-06-19 NOTE — TELEPHONE ENCOUNTER
Outgoing call to pt regarding her fasenra refill. Pt was unsure if she is injected every 28 days or 8 weeks. Pt stated she will reach out to the provider. Routing [facility] as well. Will follow up tomorrow

## 2023-06-19 NOTE — TELEPHONE ENCOUNTER
Spoke with pt in regards to message sent. Verbalized per Onel that we can refer you to Silvana Funez, high [facility] breast clinic. To discuss other option available. Its difficult to get breast MRI covered sometimes.

## 2023-06-19 NOTE — TELEPHONE ENCOUNTER
Specialty Pharmacy - Refill Coordination    Specialty Medication Orders Linked to Encounter      Flowsheet Row Most Recent Value   Medication #1 benralizumab (FASENRA PEN) 30 mg/mL AtIn (Order#[id number], Rx#[id number])        3rd dose of Loading dose (6/28)    Refill Questions - Documented Responses      Flowsheet Row Most Recent Value   Patient Availability and HIPAA Verification    Does patient want to proceed with activity? Yes   HIPAA/medical authority confirmed? Yes   Relationship to patient of person spoken to? Self   Refill Screening Questions    Changes to allergies? No   Changes to medications? No   New conditions since last clinic visit? No   Unplanned office visit, urgent care, ED, or hospital admission in the last 4 weeks? No   How does patient/caregiver feel medication is working? Too soon to tell   Financial problems or insurance changes? No   How many doses of your specialty medications were missed in the last 4 weeks? 0   Would patient like to speak to a pharmacist? No   When does the patient need to receive the medication? 06/28/23   Refill Delivery Questions    How will the patient receive the medication? [facility]   When does the patient need to receive the medication? 06/28/23   Shipping Address Home   Address in [facility] confirmed and updated if neccessary? Yes   Expected Copay ($) 120   Is the patient able to afford the medication copay? Yes   Payment Method CC on file   Days supply of Refill 56   Supplies needed? No supplies needed   Refill activity completed? Yes   Refill activity plan Refill scheduled   Shipment/Pickup Date: 06/26/23            Current Outpatient Medications   Medication Sig    albuterol (ACCUNEB) 1.25 mg/3 mL Nebu Take 3 mLs (1.25 mg total) by nebulization every 6 (six) hours as needed (Shortness of breath, wheezing, cough). Rescue    albuterol (PROVENTIL/VENTOLIN HFA) 90 mcg/actuation inhaler Inhale 2 puffs into the lungs every 4 (four) hours as needed for Wheezing  or Shortness of Breath. Rescue    benralizumab (FASENRA PEN) 30 mg/mL AtIn Inject 1 mL into the skin every 28 days.    benralizumab (FASENRA PEN) 30 mg/mL AtIn Inject 1 mL into the skin every 8 weeks.    benzonatate (TESSALON) 100 MG capsule Take 1 capsule (100 mg total) by mouth 3 (three) times daily as needed for Cough.    clotrimazole-betamethasone 1-0.05% (LOTRISONE) cream Apply topically 2 (two) times daily.    fluticasone propionate (FLONASE) 50 mcg/actuation nasal spray 1 spray (50 mcg total) by Each Nostril route once daily.    fluticasone-umeclidin-vilanter (TRELEGY ELLIPTA) 100-62.5-25 mcg DsDv Inhale 1 puff into the lungs once daily.    lansoprazole (PREVACID) 30 MG capsule Take 30 mg by mouth.    meclizine (ANTIVERT) 25 mg tablet Take 1 tablet (25 mg total) by mouth 3 (three) times daily as needed for Dizziness.    montelukast (SINGULAIR) 10 mg tablet Take 1 tablet (10 mg total) by mouth every evening.    MYRBETRIQ 50 mg Tb24 Take 1 tablet (50 mg total) by mouth once daily.    nystatin (MYCOSTATIN) 100,000 unit/mL suspension Take by mouth.    PLENVU 140-9-5.2 gram PPkS Take as directed    predniSONE (DELTASONE) 20 MG tablet Take one pill per day for three days as needed for shortness of breath, wheezing, cough. May repeat as needed.    predniSONE (DELTASONE) 20 MG tablet Take three pills per day for three days. Two pills per day for three days. One pill per day for three days.    progesterone (PROMETRIUM) 200 MG capsule TAKE 1 CAPSULE BY MOUTH ONCE IN THE MORNING    rosuvastatin (CRESTOR) 5 MG tablet TAKE 1 TABLET (5 MG TOTAL) BY MOUTH ONCE DAILY.    spironolactone (ALDACTONE) 100 MG tablet     thyroid, pork, (ARMOUR THYROID) 30 mg Tab 30 mg by Per G Tube route once daily.   Last reviewed on 6/13/2023  8:20 AM by Damari Guerra NP    Review of patient's allergies indicates:  No Known Allergies Last reviewed on  6/13/2023 8:20 AM by Damari Guerra      Tasks added this encounter   No tasks added.    Tasks due within next 3 months   6/20/2023 - Refill Coordination Outreach (1 time occurrence)     Aarti England, PharmD  [facility]  [address]  [facility] [address]  Phone: [phone]  Fax: [phone]

## 2023-06-19 NOTE — TELEPHONE ENCOUNTER
Spoke with pt in regards to recent breast ultrasound results. Verbalized per Onel that pt's breast ultrasound stated a probably benign findings on the ultrasound of the left breast but radiologist recommends short-term follow-up diagnostic mammogram with ultrasound in 6 months. Pt states that she would like to have a MRI done instead of waiting for a repeat U/S and mammo.

## 2023-07-07 DIAGNOSIS — Z91.89 AT HIGH RISK FOR BREAST CANCER: Primary | ICD-10-CM

## 2023-07-18 RX ORDER — MIRABEGRON 50 MG/1
1 TABLET, FILM COATED, EXTENDED RELEASE ORAL DAILY
Qty: 90 TABLET | Refills: 0 | Status: SHIPPED | OUTPATIENT
Start: 2023-07-18 | End: 2023-10-17 | Stop reason: SDUPTHER

## 2023-07-21 NOTE — PROGRESS NOTES
History:     Reason For Consultation:   Family history of breast cancer and abnormal Mammogram    Referring Provider:   Denver Herrera PA-C  [address] [facility]  [address]  [place] [address]    Dr. Guzmán GYN-  Fall 2023    Records Obtained: Records of the patients history including those obtained from the referring provider were reviewed and summarized in detail.    HPI:   Eboni Reeves presents for consultation of abnormal mammogram and family history of breast cancer. She is postmenopausal. She presented for screening mammogram which was abnormal but revealed a Tyrer-Cuzick score of 9.69%. Patient scheduled for screening colonoscopy in Sept.     Patient diagnosed with cervical cancer at 21.    No smoking; Social ETOH; No regular exercise  Breast cancer specific history:  Periods started at age: 14  Number of pregnancies: 1; age of first live birth: 25  Number of prior breast biopsies: None  History of breast feeding: No  Family members with breast or ovarian cancer: Maternal Grandmother Breast Cancer at 45; Mother fibrocystic disease; Paternal Uncle Prostate cancer  Uterus and ovaries intact: Yes Partial hyst one ovary left- 35  Supplemental hormone therapy: No    Past Medical   Past Medical History:   Diagnosis Date    Asthma     Cystitis, interstitial     DVT (deep venous thrombosis)     Encounter for blood transfusion     Fx     RIGHT ANKLE    Hyperlipidemia     LVH (left ventricular hypertrophy)     Renal disorder     STONE    Stroke     AGE 20    TMJ (dislocation of temporomandibular joint)     Urinary tract infection      Patient Active Problem List   Diagnosis    Overactive bladder    Idiopathic peripheral neuropathy    Mild persistent asthma without complication    Obstructive sleep apnea    Toe pain, right    OM (onychomycosis) - Left Foot    Hypercholesterolemia    Elevated coronary artery calcium score     Social History   Social History     Tobacco Use    Smoking status: Never     Passive  exposure: Past    Smokeless tobacco: Never   Substance Use Topics    Alcohol use: Yes    Drug use: No     Family History  Family History   Problem Relation Age of Onset    Urolithiasis Father     Prostate cancer Paternal Uncle     Breast cancer Maternal Grandmother     Kidney cancer Neg Hx      Medications    Current Outpatient Medications:     albuterol (ACCUNEB) 1.25 mg/3 mL Nebu, Take 3 mLs (1.25 mg total) by nebulization every 6 (six) hours as needed (Shortness of breath, wheezing, cough). Rescue, Disp: 75 mL, Rfl: 11    albuterol (PROVENTIL/VENTOLIN HFA) 90 mcg/actuation inhaler, Inhale 2 puffs into the lungs every 4 (four) hours as needed for Wheezing or Shortness of Breath. Rescue, Disp: 18 g, Rfl: 11    benralizumab (FASENRA PEN) 30 mg/mL AtIn, Inject 1 mL into the skin every 28 days., Disp: 1 mL, Rfl: 1    benralizumab (FASENRA PEN) 30 mg/mL AtIn, Inject 1 mL into the skin every 8 weeks., Disp: 1 each, Rfl: 5    benzonatate (TESSALON) 100 MG capsule, Take 1 capsule (100 mg total) by mouth 3 (three) times daily as needed for Cough., Disp: 30 capsule, Rfl: 2    clotrimazole-betamethasone 1-0.05% (LOTRISONE) cream, Apply topically 2 (two) times daily., Disp: 45 g, Rfl: 1    fluticasone propionate (FLONASE) 50 mcg/actuation nasal spray, 1 spray (50 mcg total) by Each Nostril route once daily., Disp: 16 g, Rfl: 11    fluticasone-umeclidin-vilanter (TRELEGY ELLIPTA) 100-62.5-25 mcg DsDv, Inhale 1 puff into the lungs once daily., Disp: 180 each, Rfl: 3    lansoprazole (PREVACID) 30 MG capsule, Take 30 mg by mouth., Disp: , Rfl:     meclizine (ANTIVERT) 25 mg tablet, Take 1 tablet (25 mg total) by mouth 3 (three) times daily as needed for Dizziness., Disp: 30 tablet, Rfl: 0    montelukast (SINGULAIR) 10 mg tablet, Take 1 tablet (10 mg total) by mouth every evening., Disp: 90 tablet, Rfl: 3    MYRBETRIQ 50 mg Tb24, Take 1 tablet (50 mg total) by mouth once daily., Disp: 90 tablet, Rfl: 0    nystatin (MYCOSTATIN)  100,000 unit/mL suspension, Take by mouth., Disp: , Rfl:     PLENVU 140-9-5.2 gram PPkS, Take as directed, Disp: , Rfl:     predniSONE (DELTASONE) 20 MG tablet, Take one pill per day for three days as needed for shortness of breath, wheezing, cough. May repeat as needed., Disp: 21 tablet, Rfl: 0    predniSONE (DELTASONE) 20 MG tablet, Take three pills per day for three days. Two pills per day for three days. One pill per day for three days., Disp: 18 tablet, Rfl: 0    progesterone (PROMETRIUM) 200 MG capsule, TAKE 1 CAPSULE BY MOUTH ONCE IN THE MORNING, Disp: , Rfl:     spironolactone (ALDACTONE) 100 MG tablet, , Disp: , Rfl:     thyroid, pork, (ARMOUR THYROID) 30 mg Tab, 30 mg by Per G Tube route once daily., Disp: , Rfl:     rosuvastatin (CRESTOR) 5 MG tablet, TAKE 1 TABLET (5 MG TOTAL) BY MOUTH ONCE DAILY., Disp: 30 tablet, Rfl: 11    Current Facility-Administered Medications:     acetaminophen tablet 650 mg, 650 mg, Oral, Once PRN, Denver Herrera PA-C    albuterol inhaler 2 puff, 2 puff, Inhalation, Q20 Min PRN, Denver Herrera PA-C    BUPivacaine injection 7.5 mg, 1.5 mL, Infiltration, 1 time in Clinic/HOD, Patrice Arredondo DPM    BUPivacaine injection 7.5 mg, 1.5 mL, Infiltration, 1 time in Clinic/HOD, Patrice Arredondo DPM  Allergies  Review of patient's allergies indicates:  No Known Allergies  Review of Systems  Review of Systems   Constitutional:  Negative for fever and unexpected weight change.   HENT:  Negative for postnasal drip and sore throat.    Eyes:  Negative for visual disturbance.   Respiratory:  Negative for cough and shortness of breath.    Cardiovascular:  Negative for chest pain and leg swelling.   Gastrointestinal:  Negative for abdominal pain, blood in stool, constipation, diarrhea, nausea and vomiting.   Genitourinary:  Negative for dysuria and hematuria.   Musculoskeletal:  Negative for neck stiffness.   Skin:  Negative for color change and rash.   Neurological:  Negative  "for headaches.   Hematological:  Does not bruise/bleed easily.     Objective:     Vitals:    07/24/23 1451   BP: (!) 151/69   Pulse: 72   Resp: 16   Temp: 98.7 °F (37.1 °C)   Weight: 80.7 kg (178 lb)   Height: 5' 7" (1.702 m)     Body surface area is 1.95 meters squared.  Physical Exam  Constitutional:       General: She is not in acute distress.     Appearance: Normal appearance. She is well-developed.   HENT:      Head: Normocephalic and atraumatic.      Right Ear: Hearing, tympanic membrane, ear canal and external ear normal.      Left Ear: Hearing, tympanic membrane, ear canal and external ear normal.      Nose: Nose normal.      Mouth/Throat:      Pharynx: Uvula midline.   Eyes:      Conjunctiva/sclera: Conjunctivae normal.      Pupils: Pupils are equal, round, and reactive to light.   Neck:      Thyroid: No thyroid mass or thyromegaly.      Trachea: Trachea normal.   Cardiovascular:      Rate and Rhythm: Normal rate and regular rhythm.      Pulses: Normal pulses.      Heart sounds: Normal heart sounds, S1 normal and S2 normal.   Pulmonary:      Breath sounds: Normal breath sounds. No wheezing, rhonchi or rales.   Chest:       Abdominal:      General: Bowel sounds are normal. There is no distension.      Tenderness: There is no guarding or rebound.   Musculoskeletal:      Right shoulder: No deformity or crepitus. Normal range of motion.      Left shoulder: No deformity or crepitus. Normal range of motion.      Cervical back: Neck supple.   Lymphadenopathy:      Cervical: No cervical adenopathy.      Upper Body:      Right upper body: No supraclavicular, axillary or pectoral adenopathy.      Left upper body: No supraclavicular, axillary or pectoral adenopathy.   Skin:     General: Skin is warm and dry.      Capillary Refill: Capillary refill takes less than 2 seconds.      Findings: No lesion or rash.      Nails: There is no clubbing.   Neurological:      Mental Status: She is alert and oriented to person, " place, and time.      Sensory: No sensory deficit.      Motor: No tremor.   Psychiatric:         Speech: Speech normal.         Behavior: Behavior normal.         Objective   Labs and Imaging  Results for orders placed or performed in visit on 05/02/23   CBC auto differential   Result Value Ref Range    WBC 8.77 3.90 - 12.70 K/uL    RBC 4.71 4.00 - 5.40 M/uL    Hemoglobin 13.8 12.0 - 16.0 g/dL    Hematocrit 42.6 37.0 - 48.5 %    MCV 90 82 - 98 fL    MCH 29.3 27.0 - 31.0 pg    MCHC 32.4 32.0 - 36.0 g/dL    RDW 13.4 11.5 - 14.5 %    Platelets 370 150 - 450 K/uL    MPV 10.0 9.2 - 12.9 fL    Immature Granulocytes 0.2 0.0 - 0.5 %    Gran # (ANC) 5.5 1.8 - 7.7 K/uL    Immature Grans (Abs) 0.02 0.00 - 0.04 K/uL    Lymph # 2.1 1.0 - 4.8 K/uL    Mono # 0.9 0.3 - 1.0 K/uL    Eos # 0.1 0.0 - 0.5 K/uL    Baso # 0.08 0.00 - 0.20 K/uL    nRBC 0 0 /100 WBC    Gran % 62.7 38.0 - 73.0 %    Lymph % 24.3 18.0 - 48.0 %    Mono % 10.5 4.0 - 15.0 %    Eosinophil % 1.4 0.0 - 8.0 %    Basophil % 0.9 0.0 - 1.9 %    Differential Method Automated        Mammogram and Breast US 6/16/2023:  FINDINGS:  Compression tomosynthesis and true lateral images are submitted.     Small circumscribed nodule at the central aspect of the left breast with adjacent punctate well-marginated calcifications is again noted.  Smaller circumscribed nodule with punctate calcification in the upper outer left breast also persists on these additional views.  Ultrasound will be targeted to these regions for further evaluation.     Left breast ultrasound     Sonographic assessment of the upper outer left breast demonstrates a 4 mm circumscribed hypoechoic nodule, likely a small complex cyst or lymph node, with probably benign features.  Small circumscribed simple appearing cysts are noted at the 12 o'clock position of the left breast approximately 7 cm from the nipple.  While 1 of these may correspond with the circumscribed nodule seen mammographically, this cannot be  stated with certainty.  In review of a prior study from 2021, the circumscribed nodule the central aspect the left breast was present at that time, and is not significantly changed.  The tiny circumscribed nodule posteriorly in the upper outer quadrant was also likely present, perhaps minimally larger on the current study.     Overall, findings are felt to be probably benign and short-term follow-up is recommended.     Impression:     1. Probably benign circumscribed nodules in the left breast as above.     Breast pathology and imaging as above.     Diagnosis:     1. Abnormal mammogram  MRI Breast w/wo Contrast, w/CAD, Bilateral    Ambulatory referral/consult to Hematology / Oncology      2. Inconclusive mammogram due to dense breasts  MRI Breast w/wo Contrast, w/CAD, Bilateral      3. At high risk for breast cancer  Ambulatory referral to Breast Clinic      4. Cystic breast, left  MRI Breast w/wo Contrast, w/CAD, Bilateral      5. Dense breast tissue on mammogram  MRI Breast w/wo Contrast, w/CAD, Bilateral            Assessment:   1. Increased risk of breast cancer   * Aranzaer-Sedrickck (TC) lifetime risk of 9.69%       * Reviewed Lifestyle modifications which have shown benefit:  Limit alcohol consumption to less than 1 drink per day (1 ounce liquor, 6 oz wine, 8 oz beer)  Exercise at least 150 minutes per week of moderate intensity aerobic activity or at least 75 minutes of vigorous activity. Exercise can lower the relative risk of breast cancer by ~18-20%.  Maintain healthy weight and avoid post-menopausal weight gain.  We also discussed that obesity is linked to a higher risk of breast cancer; therefore, exercise is very important. I recommended proper nutrition with fresh fruits and vegetables and lean meats and avoidance of processed foods.      * Reviewed she would benefit from annual MRI's in addition to yearly mammograms, alternating imaging every 6 months until age 75.  The pros and cons of MRI screening were  presented.  I also recommended two physical exams per year, one can be with her OB/GYN. These have been shown to lower the risk of breast cancer incidence, however there is no survival benefit in patients who don't have breast cancer.     Plan:       Screening recommended with a baseline Breast MRI due to mammogram abnormality and dense breast tissue  Regular self breast exams.  Lifestyle modifications as detailed above.      We discussed that there are limitations to every model for risk assessment, particularly that TC can overestimate risk in women with atypical hyperplasia and dense breasts and that Mary Grace underestimates risk for those with a strong family history of breast or ovarian cancers as well as non-white women with atypical hyperplasia which can make them appear to not be candidates for risk reducing therapies.      Follow-up in 1 months. I asked the patient to call for any questions, concerns, or new symptoms.  I spent 60 minutes with patient and family with 45 spent face to face counseling on diagnosis, goals of therapy, risk reduction options    Electronically signed by Jasmina Funez, MSN, APRN, AGNP-C, OCN

## 2023-07-24 ENCOUNTER — OFFICE VISIT (OUTPATIENT)
Dept: ONCOLOGY | Facility: CLINIC | Age: 68
End: 2023-07-24
Payer: MEDICARE

## 2023-07-24 VITALS
BODY MASS INDEX: 27.94 KG/M2 | DIASTOLIC BLOOD PRESSURE: 69 MMHG | HEIGHT: 67 IN | RESPIRATION RATE: 16 BRPM | TEMPERATURE: 99 F | HEART RATE: 72 BPM | WEIGHT: 178 LBS | SYSTOLIC BLOOD PRESSURE: 151 MMHG

## 2023-07-24 DIAGNOSIS — R92.30 DENSE BREAST TISSUE ON MAMMOGRAM: ICD-10-CM

## 2023-07-24 DIAGNOSIS — R92.8 ABNORMAL MAMMOGRAM: Primary | ICD-10-CM

## 2023-07-24 DIAGNOSIS — R92.2 INCONCLUSIVE MAMMOGRAM DUE TO DENSE BREASTS: ICD-10-CM

## 2023-07-24 DIAGNOSIS — Z91.89 AT HIGH RISK FOR BREAST CANCER: ICD-10-CM

## 2023-07-24 DIAGNOSIS — R92.30 INCONCLUSIVE MAMMOGRAM DUE TO DENSE BREASTS: ICD-10-CM

## 2023-07-24 DIAGNOSIS — N60.12 CYSTIC BREAST, LEFT: ICD-10-CM

## 2023-07-24 PROCEDURE — 1159F MED LIST DOCD IN RCRD: CPT | Mod: CPTII,S$GLB,, | Performed by: NURSE PRACTITIONER

## 2023-07-24 PROCEDURE — 3008F PR BODY MASS INDEX (BMI) DOCUMENTED: ICD-10-PCS | Mod: CPTII,S$GLB,, | Performed by: NURSE PRACTITIONER

## 2023-07-24 PROCEDURE — 1101F PR PT FALLS ASSESS DOC 0-1 FALLS W/OUT INJ PAST YR: ICD-10-PCS | Mod: CPTII,S$GLB,, | Performed by: NURSE PRACTITIONER

## 2023-07-24 PROCEDURE — 99205 PR OFFICE/OUTPT VISIT, NEW, LEVL V, 60-74 MIN: ICD-10-PCS | Mod: S$GLB,,, | Performed by: NURSE PRACTITIONER

## 2023-07-24 PROCEDURE — 1101F PT FALLS ASSESS-DOCD LE1/YR: CPT | Mod: CPTII,S$GLB,, | Performed by: NURSE PRACTITIONER

## 2023-07-24 PROCEDURE — 1126F PR PAIN SEVERITY QUANTIFIED, NO PAIN PRESENT: ICD-10-PCS | Mod: CPTII,S$GLB,, | Performed by: NURSE PRACTITIONER

## 2023-07-24 PROCEDURE — 1160F RVW MEDS BY RX/DR IN RCRD: CPT | Mod: CPTII,S$GLB,, | Performed by: NURSE PRACTITIONER

## 2023-07-24 PROCEDURE — 3077F SYST BP >= 140 MM HG: CPT | Mod: CPTII,S$GLB,, | Performed by: NURSE PRACTITIONER

## 2023-07-24 PROCEDURE — 3078F PR MOST RECENT DIASTOLIC BLOOD PRESSURE < 80 MM HG: ICD-10-PCS | Mod: CPTII,S$GLB,, | Performed by: NURSE PRACTITIONER

## 2023-07-24 PROCEDURE — 3008F BODY MASS INDEX DOCD: CPT | Mod: CPTII,S$GLB,, | Performed by: NURSE PRACTITIONER

## 2023-07-24 PROCEDURE — 3288F FALL RISK ASSESSMENT DOCD: CPT | Mod: CPTII,S$GLB,, | Performed by: NURSE PRACTITIONER

## 2023-07-24 PROCEDURE — 1160F PR REVIEW ALL MEDS BY PRESCRIBER/CLIN PHARMACIST DOCUMENTED: ICD-10-PCS | Mod: CPTII,S$GLB,, | Performed by: NURSE PRACTITIONER

## 2023-07-24 PROCEDURE — 1159F PR MEDICATION LIST DOCUMENTED IN MEDICAL RECORD: ICD-10-PCS | Mod: CPTII,S$GLB,, | Performed by: NURSE PRACTITIONER

## 2023-07-24 PROCEDURE — 3078F DIAST BP <80 MM HG: CPT | Mod: CPTII,S$GLB,, | Performed by: NURSE PRACTITIONER

## 2023-07-24 PROCEDURE — 99205 OFFICE O/P NEW HI 60 MIN: CPT | Mod: S$GLB,,, | Performed by: NURSE PRACTITIONER

## 2023-07-24 PROCEDURE — 3288F PR FALLS RISK ASSESSMENT DOCUMENTED: ICD-10-PCS | Mod: CPTII,S$GLB,, | Performed by: NURSE PRACTITIONER

## 2023-07-24 PROCEDURE — 3077F PR MOST RECENT SYSTOLIC BLOOD PRESSURE >= 140 MM HG: ICD-10-PCS | Mod: CPTII,S$GLB,, | Performed by: NURSE PRACTITIONER

## 2023-07-24 PROCEDURE — 1126F AMNT PAIN NOTED NONE PRSNT: CPT | Mod: CPTII,S$GLB,, | Performed by: NURSE PRACTITIONER

## 2023-08-11 ENCOUNTER — HOSPITAL ENCOUNTER (OUTPATIENT)
Dept: RADIOLOGY | Facility: HOSPITAL | Age: 68
Discharge: HOME | End: 2023-08-11
Attending: NURSE PRACTITIONER
Payer: MEDICARE

## 2023-08-11 DIAGNOSIS — N60.12 CYSTIC BREAST, LEFT: ICD-10-CM

## 2023-08-11 DIAGNOSIS — R92.30 INCONCLUSIVE MAMMOGRAM DUE TO DENSE BREASTS: ICD-10-CM

## 2023-08-11 DIAGNOSIS — R92.8 ABNORMAL MAMMOGRAM: ICD-10-CM

## 2023-08-11 DIAGNOSIS — R92.30 DENSE BREAST TISSUE ON MAMMOGRAM: ICD-10-CM

## 2023-08-11 DIAGNOSIS — R92.2 INCONCLUSIVE MAMMOGRAM DUE TO DENSE BREASTS: ICD-10-CM

## 2023-08-11 LAB
CREAT SERPL-MCNC: 0.9 MG/DL (ref 0.5–1.4)
SAMPLE: NORMAL

## 2023-08-11 PROCEDURE — A9585 GADOBUTROL INJECTION: HCPCS | Mod: PO | Performed by: NURSE PRACTITIONER

## 2023-08-11 PROCEDURE — 77049 MRI BREAST C-+ W/CAD BI: CPT | Mod: TC,PO

## 2023-08-11 PROCEDURE — 25500020 PHARM REV CODE 255: Mod: PO | Performed by: NURSE PRACTITIONER

## 2023-08-11 RX ORDER — GADOBUTROL 604.72 MG/ML
8.5 INJECTION INTRAVENOUS
Status: COMPLETED | OUTPATIENT
Start: 2023-08-11 | End: 2023-08-11

## 2023-08-11 RX ADMIN — GADOBUTROL 8.5 ML: 604.72 INJECTION INTRAVENOUS at 08:08

## 2023-08-11 NOTE — PROGRESS NOTES
Please call and notify patient her breast MRI results is negative. Let her know I can follow her yearly for the breast exam or she can see me as needed. She is not high risk so they will cover yearly MRI breast.

## 2023-08-14 ENCOUNTER — SPECIALTY PHARMACY (OUTPATIENT)
Dept: PHARMACY | Facility: CLINIC | Age: 68
End: 2023-08-14
Payer: MEDICARE

## 2023-08-14 NOTE — TELEPHONE ENCOUNTER
Outgoing call to pt regarding refill for fasenra; pt was unsure of next injection date and requested to get a call back on 8/15 to schedule refill/delivery

## 2023-08-15 NOTE — TELEPHONE ENCOUNTER
Outgoing call regarding refill for fasenra; pt states she's driving and would like to give us a call back later

## 2023-08-15 NOTE — TELEPHONE ENCOUNTER
Specialty Pharmacy - Refill Coordination    Specialty Medication Orders Linked to Encounter      Flowsheet Row Most Recent Value   Medication #1 benralizumab (FASENRA PEN) 30 mg/mL AtIn (Order#[id number], Rx#[id number])            Refill Questions - Documented Responses      Flowsheet Row Most Recent Value   Patient Availability and HIPAA Verification    Does patient want to proceed with activity? Yes   HIPAA/medical authority confirmed? Yes   Relationship to patient of person spoken to? Self   Refill Screening Questions    Changes to allergies? No   Changes to medications? No   New conditions since last clinic visit? No   Unplanned office visit, urgent care, ED, or hospital admission in the last 4 weeks? No   How does patient/caregiver feel medication is working? Good   Financial problems or insurance changes? No   How many doses of your specialty medications were missed in the last 4 weeks? 0   Would patient like to speak to a pharmacist? No   When does the patient need to receive the medication? 08/23/23   Refill Delivery Questions    How will the patient receive the medication? [facility]   When does the patient need to receive the medication? 08/23/23   Shipping Address Home   Address in [facility] confirmed and updated if neccessary? Yes   Expected Copay ($) 120   Is the patient able to afford the medication copay? Yes   Payment Method CC on file   Days supply of Refill 56   Supplies needed? No supplies needed   Refill activity completed? Yes   Refill activity plan Refill scheduled   Shipment/Pickup Date: 08/17/23            Current Outpatient Medications   Medication Sig    albuterol (ACCUNEB) 1.25 mg/3 mL Nebu Take 3 mLs (1.25 mg total) by nebulization every 6 (six) hours as needed (Shortness of breath, wheezing, cough). Rescue    albuterol (PROVENTIL/VENTOLIN HFA) 90 mcg/actuation inhaler Inhale 2 puffs into the lungs every 4 (four) hours as needed for Wheezing or Shortness of Breath. Rescue     benralizumab (FASENRA PEN) 30 mg/mL AtIn Inject 1 mL into the skin every 28 days.    benralizumab (FASENRA PEN) 30 mg/mL AtIn Inject 1 mL into the skin every 8 weeks.    benzonatate (TESSALON) 100 MG capsule Take 1 capsule (100 mg total) by mouth 3 (three) times daily as needed for Cough.    clotrimazole-betamethasone 1-0.05% (LOTRISONE) cream Apply topically 2 (two) times daily.    fluticasone propionate (FLONASE) 50 mcg/actuation nasal spray 1 spray (50 mcg total) by Each Nostril route once daily.    fluticasone-umeclidin-vilanter (TRELEGY ELLIPTA) 100-62.5-25 mcg DsDv Inhale 1 puff into the lungs once daily.    lansoprazole (PREVACID) 30 MG capsule Take 30 mg by mouth.    meclizine (ANTIVERT) 25 mg tablet Take 1 tablet (25 mg total) by mouth 3 (three) times daily as needed for Dizziness.    montelukast (SINGULAIR) 10 mg tablet Take 1 tablet (10 mg total) by mouth every evening.    MYRBETRIQ 50 mg Tb24 Take 1 tablet (50 mg total) by mouth once daily.    nystatin (MYCOSTATIN) 100,000 unit/mL suspension Take by mouth.    PLENVU 140-9-5.2 gram PPkS Take as directed    predniSONE (DELTASONE) 20 MG tablet Take one pill per day for three days as needed for shortness of breath, wheezing, cough. May repeat as needed.    predniSONE (DELTASONE) 20 MG tablet Take three pills per day for three days. Two pills per day for three days. One pill per day for three days.    progesterone (PROMETRIUM) 200 MG capsule TAKE 1 CAPSULE BY MOUTH ONCE IN THE MORNING    rosuvastatin (CRESTOR) 5 MG tablet TAKE 1 TABLET (5 MG TOTAL) BY MOUTH ONCE DAILY.    spironolactone (ALDACTONE) 100 MG tablet     thyroid, pork, (ARMOUR THYROID) 30 mg Tab 30 mg by Per G Tube route once daily.   Last reviewed on 7/24/2023  4:17 PM by Jasmina Funez NP-C    Review of patient's allergies indicates:  No Known Allergies Last reviewed on  8/11/2023 8:25 AM by Sofia Jimenez      Tasks added this encounter   No tasks added.   Tasks due within next 3 months   No  tasks due.     Dario Love, PharmD  [place] [facility] license of [facility]  [address] [facility] [facility] [address]  Phone: [phone]  Fax: [phone]

## 2023-08-16 ENCOUNTER — TELEPHONE (OUTPATIENT)
Dept: ONCOLOGY | Facility: CLINIC | Age: 68
End: 2023-08-16

## 2023-08-16 NOTE — TELEPHONE ENCOUNTER
----- Message from MACY Thompson sent at 8/11/2023 11:56 AM CDT -----  Please call and notify patient her breast MRI results is negative. Let her know I can follow her yearly for the breast exam or she can see me as needed. She is not high risk so they will cover yearly MRI breast.

## 2023-08-16 NOTE — TELEPHONE ENCOUNTER
Spoke with pt. She would like to continue to have her breast exams with her gyn only and will contact us if she needs anything.

## 2023-08-21 DIAGNOSIS — J45.30 MILD PERSISTENT ASTHMA WITHOUT COMPLICATION: ICD-10-CM

## 2023-09-15 DIAGNOSIS — R09.89 CHRONIC SINUS COMPLAINTS: ICD-10-CM

## 2023-09-15 RX ORDER — FLUTICASONE PROPIONATE 50 MCG
1 SPRAY, SUSPENSION (ML) NASAL DAILY
Qty: 16 G | Refills: 11 | Status: SHIPPED | OUTPATIENT
Start: 2023-09-15

## 2023-09-26 ENCOUNTER — TELEPHONE (OUTPATIENT)
Dept: PULMONOLOGY | Facility: CLINIC | Age: 68
End: 2023-09-26

## 2023-09-26 ENCOUNTER — OFFICE VISIT (OUTPATIENT)
Dept: PULMONOLOGY | Facility: CLINIC | Age: 68
End: 2023-09-26
Payer: MEDICARE

## 2023-09-26 VITALS
DIASTOLIC BLOOD PRESSURE: 84 MMHG | WEIGHT: 167 LBS | HEART RATE: 72 BPM | HEIGHT: 67 IN | SYSTOLIC BLOOD PRESSURE: 149 MMHG | OXYGEN SATURATION: 98 % | BODY MASS INDEX: 26.21 KG/M2

## 2023-09-26 DIAGNOSIS — J45.50 SEVERE PERSISTENT ASTHMA WITHOUT COMPLICATION: Primary | ICD-10-CM

## 2023-09-26 DIAGNOSIS — G47.33 OBSTRUCTIVE SLEEP APNEA SYNDROME: ICD-10-CM

## 2023-09-26 PROCEDURE — 3079F PR MOST RECENT DIASTOLIC BLOOD PRESSURE 80-89 MM HG: ICD-10-PCS | Mod: CPTII,S$GLB,, | Performed by: NURSE PRACTITIONER

## 2023-09-26 PROCEDURE — 3008F PR BODY MASS INDEX (BMI) DOCUMENTED: ICD-10-PCS | Mod: CPTII,S$GLB,, | Performed by: NURSE PRACTITIONER

## 2023-09-26 PROCEDURE — 3077F SYST BP >= 140 MM HG: CPT | Mod: CPTII,S$GLB,, | Performed by: NURSE PRACTITIONER

## 2023-09-26 PROCEDURE — 3077F PR MOST RECENT SYSTOLIC BLOOD PRESSURE >= 140 MM HG: ICD-10-PCS | Mod: CPTII,S$GLB,, | Performed by: NURSE PRACTITIONER

## 2023-09-26 PROCEDURE — 1101F PR PT FALLS ASSESS DOC 0-1 FALLS W/OUT INJ PAST YR: ICD-10-PCS | Mod: CPTII,S$GLB,, | Performed by: NURSE PRACTITIONER

## 2023-09-26 PROCEDURE — 1126F PR PAIN SEVERITY QUANTIFIED, NO PAIN PRESENT: ICD-10-PCS | Mod: CPTII,S$GLB,, | Performed by: NURSE PRACTITIONER

## 2023-09-26 PROCEDURE — 1159F PR MEDICATION LIST DOCUMENTED IN MEDICAL RECORD: ICD-10-PCS | Mod: CPTII,S$GLB,, | Performed by: NURSE PRACTITIONER

## 2023-09-26 PROCEDURE — 99213 OFFICE O/P EST LOW 20 MIN: CPT | Mod: S$GLB,,, | Performed by: NURSE PRACTITIONER

## 2023-09-26 PROCEDURE — 99999 PR PBB SHADOW E&M-EST. PATIENT-LVL V: ICD-10-PCS | Mod: PBBFAC,,, | Performed by: NURSE PRACTITIONER

## 2023-09-26 PROCEDURE — 3079F DIAST BP 80-89 MM HG: CPT | Mod: CPTII,S$GLB,, | Performed by: NURSE PRACTITIONER

## 2023-09-26 PROCEDURE — 99213 PR OFFICE/OUTPT VISIT, EST, LEVL III, 20-29 MIN: ICD-10-PCS | Mod: S$GLB,,, | Performed by: NURSE PRACTITIONER

## 2023-09-26 PROCEDURE — 1159F MED LIST DOCD IN RCRD: CPT | Mod: CPTII,S$GLB,, | Performed by: NURSE PRACTITIONER

## 2023-09-26 PROCEDURE — 3008F BODY MASS INDEX DOCD: CPT | Mod: CPTII,S$GLB,, | Performed by: NURSE PRACTITIONER

## 2023-09-26 PROCEDURE — 1126F AMNT PAIN NOTED NONE PRSNT: CPT | Mod: CPTII,S$GLB,, | Performed by: NURSE PRACTITIONER

## 2023-09-26 PROCEDURE — 3288F PR FALLS RISK ASSESSMENT DOCUMENTED: ICD-10-PCS | Mod: CPTII,S$GLB,, | Performed by: NURSE PRACTITIONER

## 2023-09-26 PROCEDURE — 3288F FALL RISK ASSESSMENT DOCD: CPT | Mod: CPTII,S$GLB,, | Performed by: NURSE PRACTITIONER

## 2023-09-26 PROCEDURE — 99999 PR PBB SHADOW E&M-EST. PATIENT-LVL V: CPT | Mod: PBBFAC,,, | Performed by: NURSE PRACTITIONER

## 2023-09-26 PROCEDURE — 1101F PT FALLS ASSESS-DOCD LE1/YR: CPT | Mod: CPTII,S$GLB,, | Performed by: NURSE PRACTITIONER

## 2023-09-26 RX ORDER — RESPIRATORY SYNCYTIAL VISUS VACCINE RECOMBINANT, ADJUVANTED 120MCG/0.5
0.5 KIT INTRAMUSCULAR ONCE
Qty: 1 EACH | Refills: 0 | Status: SHIPPED | OUTPATIENT
Start: 2023-09-26 | End: 2023-09-26

## 2023-09-26 RX ORDER — FLUTICASONE FUROATE AND VILANTEROL 200; 25 UG/1; UG/1
1 POWDER RESPIRATORY (INHALATION) DAILY
Qty: 180 EACH | Refills: 3 | Status: SHIPPED | OUTPATIENT
Start: 2023-09-26
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
## 2023-10-02 ENCOUNTER — OFFICE VISIT (OUTPATIENT)
Dept: FAMILY MEDICINE | Facility: CLINIC | Age: 68
End: 2023-10-02
Payer: MEDICARE

## 2023-10-02 VITALS
SYSTOLIC BLOOD PRESSURE: 128 MMHG | DIASTOLIC BLOOD PRESSURE: 72 MMHG | WEIGHT: 169.38 LBS | OXYGEN SATURATION: 97 % | BODY MASS INDEX: 26.58 KG/M2 | HEART RATE: 74 BPM | HEIGHT: 67 IN

## 2023-10-02 DIAGNOSIS — G47.33 OBSTRUCTIVE SLEEP APNEA: ICD-10-CM

## 2023-10-02 DIAGNOSIS — L72.3 SEBACEOUS CYST: Primary | ICD-10-CM

## 2023-10-02 DIAGNOSIS — J45.30 MILD PERSISTENT ASTHMA WITHOUT COMPLICATION: ICD-10-CM

## 2023-10-02 DIAGNOSIS — L02.91 ABSCESS: ICD-10-CM

## 2023-10-02 PROCEDURE — 3288F FALL RISK ASSESSMENT DOCD: CPT | Mod: CPTII,S$GLB,, | Performed by: NURSE PRACTITIONER

## 2023-10-02 PROCEDURE — 3078F DIAST BP <80 MM HG: CPT | Mod: CPTII,S$GLB,, | Performed by: NURSE PRACTITIONER

## 2023-10-02 PROCEDURE — 1160F PR REVIEW ALL MEDS BY PRESCRIBER/CLIN PHARMACIST DOCUMENTED: ICD-10-PCS | Mod: CPTII,S$GLB,, | Performed by: NURSE PRACTITIONER

## 2023-10-02 PROCEDURE — 99213 PR OFFICE/OUTPT VISIT, EST, LEVL III, 20-29 MIN: ICD-10-PCS | Mod: 25,S$GLB,, | Performed by: NURSE PRACTITIONER

## 2023-10-02 PROCEDURE — 1159F MED LIST DOCD IN RCRD: CPT | Mod: CPTII,S$GLB,, | Performed by: NURSE PRACTITIONER

## 2023-10-02 PROCEDURE — 3008F BODY MASS INDEX DOCD: CPT | Mod: CPTII,S$GLB,, | Performed by: NURSE PRACTITIONER

## 2023-10-02 PROCEDURE — 10060 I&D ABSCESS SIMPLE/SINGLE: CPT | Mod: S$GLB,,, | Performed by: NURSE PRACTITIONER

## 2023-10-02 PROCEDURE — 99213 OFFICE O/P EST LOW 20 MIN: CPT | Mod: 25,S$GLB,, | Performed by: NURSE PRACTITIONER

## 2023-10-02 PROCEDURE — 1160F RVW MEDS BY RX/DR IN RCRD: CPT | Mod: CPTII,S$GLB,, | Performed by: NURSE PRACTITIONER

## 2023-10-02 PROCEDURE — 3008F PR BODY MASS INDEX (BMI) DOCUMENTED: ICD-10-PCS | Mod: CPTII,S$GLB,, | Performed by: NURSE PRACTITIONER

## 2023-10-02 PROCEDURE — 3078F PR MOST RECENT DIASTOLIC BLOOD PRESSURE < 80 MM HG: ICD-10-PCS | Mod: CPTII,S$GLB,, | Performed by: NURSE PRACTITIONER

## 2023-10-02 PROCEDURE — 3074F SYST BP LT 130 MM HG: CPT | Mod: CPTII,S$GLB,, | Performed by: NURSE PRACTITIONER

## 2023-10-02 PROCEDURE — 1101F PR PT FALLS ASSESS DOC 0-1 FALLS W/OUT INJ PAST YR: ICD-10-PCS | Mod: CPTII,S$GLB,, | Performed by: NURSE PRACTITIONER

## 2023-10-02 PROCEDURE — 1159F PR MEDICATION LIST DOCUMENTED IN MEDICAL RECORD: ICD-10-PCS | Mod: CPTII,S$GLB,, | Performed by: NURSE PRACTITIONER

## 2023-10-02 PROCEDURE — 3288F PR FALLS RISK ASSESSMENT DOCUMENTED: ICD-10-PCS | Mod: CPTII,S$GLB,, | Performed by: NURSE PRACTITIONER

## 2023-10-02 PROCEDURE — 10060 PR DRAIN SKIN ABSCESS SIMPLE: ICD-10-PCS | Mod: S$GLB,,, | Performed by: NURSE PRACTITIONER

## 2023-10-02 PROCEDURE — 3074F PR MOST RECENT SYSTOLIC BLOOD PRESSURE < 130 MM HG: ICD-10-PCS | Mod: CPTII,S$GLB,, | Performed by: NURSE PRACTITIONER

## 2023-10-02 PROCEDURE — 1101F PT FALLS ASSESS-DOCD LE1/YR: CPT | Mod: CPTII,S$GLB,, | Performed by: NURSE PRACTITIONER

## 2023-10-02 RX ORDER — MUPIROCIN 20 MG/G
OINTMENT TOPICAL 3 TIMES DAILY
Qty: 22 G | Refills: 0 | Status: SHIPPED | OUTPATIENT
Start: 2023-10-02

## 2023-10-02 RX ORDER — CEPHALEXIN 500 MG/1
500 CAPSULE ORAL EVERY 8 HOURS
Qty: 24 CAPSULE | Refills: 0 | Status: SHIPPED | OUTPATIENT
Start: 2023-10-02 | End: 2024-02-19

## 2023-10-02 NOTE — PROGRESS NOTES
SUBJECTIVE:    Patient ID: Eboni Reeves is a 68 y.o. female.    Chief Complaint: Insect Bite (No bottles, Pt complains of bite on left shoulder, happen has Tuesday, Pain full to neck and shoulders//BA )    68-year-old female presents today for urgent evaluation. She is reporting spider bite to right shoulder blade. Noticed it beginning last Tuesday. Did not see spider. No fever. No drainage. Does have some pain and tenderness to site. Has not taken anything. Treated for gerd, asthma, oab.           Hospital Outpatient Visit on 08/11/2023   Component Date Value Ref Range Status    POC Creatinine 08/11/2023 0.9  0.5 - 1.4 mg/dL Final    Sample 08/11/2023 VENOUS   Final   Lab Visit on 05/02/2023   Component Date Value Ref Range Status    WBC 05/02/2023 8.77  3.90 - 12.70 K/uL Final    RBC 05/02/2023 4.71  4.00 - 5.40 M/uL Final    Hemoglobin 05/02/2023 13.8  12.0 - 16.0 g/dL Final    Hematocrit 05/02/2023 42.6  37.0 - 48.5 % Final    MCV 05/02/2023 90  82 - 98 fL Final    MCH 05/02/2023 29.3  27.0 - 31.0 pg Final    MCHC 05/02/2023 32.4  32.0 - 36.0 g/dL Final    RDW 05/02/2023 13.4  11.5 - 14.5 % Final    Platelets 05/02/2023 370  150 - 450 K/uL Final    MPV 05/02/2023 10.0  9.2 - 12.9 fL Final    Immature Granulocytes 05/02/2023 0.2  0.0 - 0.5 % Final    Gran # (ANC) 05/02/2023 5.5  1.8 - 7.7 K/uL Final    Immature Grans (Abs) 05/02/2023 0.02  0.00 - 0.04 K/uL Final    Lymph # 05/02/2023 2.1  1.0 - 4.8 K/uL Final    Mono # 05/02/2023 0.9  0.3 - 1.0 K/uL Final    Eos # 05/02/2023 0.1  0.0 - 0.5 K/uL Final    Baso # 05/02/2023 0.08  0.00 - 0.20 K/uL Final    nRBC 05/02/2023 0  0 /100 WBC Final    Gran % 05/02/2023 62.7  38.0 - 73.0 % Final    Lymph % 05/02/2023 24.3  18.0 - 48.0 % Final    Mono % 05/02/2023 10.5  4.0 - 15.0 % Final    Eosinophil % 05/02/2023 1.4  0.0 - 8.0 % Final    Basophil % 05/02/2023 0.9  0.0 - 1.9 % Final    Differential Method 05/02/2023 Automated   Final   Lab Visit on 04/28/2023   Component  Date Value Ref Range Status    WBC 04/28/2023 7.32  3.90 - 12.70 K/uL Final    RBC 04/28/2023 5.03  4.00 - 5.40 M/uL Final    Hemoglobin 04/28/2023 14.6  12.0 - 16.0 g/dL Final    Hematocrit 04/28/2023 45.5  37.0 - 48.5 % Final    MCV 04/28/2023 91  82 - 98 fL Final    MCH 04/28/2023 29.0  27.0 - 31.0 pg Final    MCHC 04/28/2023 32.1  32.0 - 36.0 g/dL Final    RDW 04/28/2023 13.6  11.5 - 14.5 % Final    Platelets 04/28/2023 345  150 - 450 K/uL Final    MPV 04/28/2023 9.7  9.2 - 12.9 fL Final    Sodium 04/28/2023 138  136 - 145 mmol/L Final    Potassium 04/28/2023 4.3  3.5 - 5.1 mmol/L Final    Chloride 04/28/2023 108  95 - 110 mmol/L Final    CO2 04/28/2023 23  23 - 29 mmol/L Final    Glucose 04/28/2023 108  70 - 110 mg/dL Final    BUN 04/28/2023 22  8 - 23 mg/dL Final    Creatinine 04/28/2023 0.8  0.5 - 1.4 mg/dL Final    Calcium 04/28/2023 10.7 (H)  8.7 - 10.5 mg/dL Final    Total Protein 04/28/2023 7.1  6.0 - 8.4 g/dL Final    Albumin 04/28/2023 4.2  3.5 - 5.2 g/dL Final    Total Bilirubin 04/28/2023 0.7  0.1 - 1.0 mg/dL Final    Alkaline Phosphatase 04/28/2023 56  55 - 135 U/L Final    AST 04/28/2023 23  10 - 40 U/L Final    ALT 04/28/2023 26  10 - 44 U/L Final    Anion Gap 04/28/2023 7 (L)  8 - 16 mmol/L Final    eGFR 04/28/2023 >60.0  >60 mL/min/1.73 m^2 Final    Cholesterol 04/28/2023 159  120 - 199 mg/dL Final    Triglycerides 04/28/2023 93  30 - 150 mg/dL Final    HDL 04/28/2023 79 (H)  40 - 75 mg/dL Final    LDL Cholesterol 04/28/2023 61.4 (L)  63.0 - 159.0 mg/dL Final    HDL/Cholesterol Ratio 04/28/2023 49.7  20.0 - 50.0 % Final    Total Cholesterol/HDL Ratio 04/28/2023 2.0  2.0 - 5.0 Final    Non-HDL Cholesterol 04/28/2023 80  mg/dL Final    TSH 04/28/2023 1.440  0.340 - 5.600 uIU/mL Final       Past Medical History:   Diagnosis Date    Asthma     Cystitis, interstitial     DVT (deep venous thrombosis)     Encounter for blood transfusion     Fx     RIGHT ANKLE    Hyperlipidemia     LVH (left ventricular  hypertrophy)     Renal disorder     STONE    Stroke     AGE 20    TMJ (dislocation of temporomandibular joint)     Urinary tract infection      Social History     Socioeconomic History    Marital status:    Tobacco Use    Smoking status: Never     Passive exposure: Past    Smokeless tobacco: Never   Substance and Sexual Activity    Alcohol use: Yes    Drug use: No     Past Surgical History:   Procedure Laterality Date    APPENDECTOMY      BLADDER SURGERY      CYSTOURETEROSCOPY WITH RETROGRADE PYELOGRAPHY AND INSERTION OF STENT INTO URETER Right 5/5/2021    Procedure: CYSTOURETEROSCOPY, WITH RETROGRADE PYELOGRAM AND URETERAL STENT INSERTION;  Surgeon: Kelsey Winkler MD;  Location: [facility] OR;  Service: Urology;  Laterality: Right;    HYSTERECTOMY      partial hysterectomy    LASER LITHOTRIPSY Right 5/5/2021    Procedure: LITHOTRIPSY, USING LASER;  Surgeon: Kelsey Winkler MD;  Location: [facility] OR;  Service: Urology;  Laterality: Right;    MANDIBLE FRACTURE SURGERY      TONSILLECTOMY      WRIST SURGERY      right wrist, had plate put in     Family History   Problem Relation Age of Onset    Urolithiasis Father     Prostate cancer Paternal Uncle     Breast cancer Maternal Grandmother     Kidney cancer Neg Hx        All of your core healthy metrics are met.      Review of patient's allergies indicates:  No Known Allergies    Current Outpatient Medications:     albuterol (ACCUNEB) 1.25 mg/3 mL Nebu, Take 3 mLs (1.25 mg total) by nebulization every 6 (six) hours as needed (Shortness of breath, wheezing, cough). Rescue, Disp: 75 mL, Rfl: 11    albuterol (PROVENTIL/VENTOLIN HFA) 90 mcg/actuation inhaler, Inhale 2 puffs into the lungs every 4 (four) hours as needed for Wheezing or Shortness of Breath. Rescue, Disp: 18 g, Rfl: 11    benralizumab (FASENRA PEN) 30 mg/mL AtIn, Inject 1 mL into the skin every 28 days., Disp: 1 mL, Rfl: 1    clotrimazole-betamethasone 1-0.05% (LOTRISONE) cream, Apply topically  2 (two) times daily., Disp: 45 g, Rfl: 1    fluticasone furoate-vilanteroL (BREO ELLIPTA) 200-25 mcg/dose DsDv diskus inhaler, Inhale 1 puff into the lungs once daily. Controller, Disp: 180 each, Rfl: 3    fluticasone propionate (FLONASE) 50 mcg/actuation nasal spray, 1 spray (50 mcg total) by Each Nostril route once daily., Disp: 16 g, Rfl: 11    lansoprazole (PREVACID) 30 MG capsule, Take 30 mg by mouth., Disp: , Rfl:     meclizine (ANTIVERT) 25 mg tablet, Take 1 tablet (25 mg total) by mouth 3 (three) times daily as needed for Dizziness., Disp: 30 tablet, Rfl: 0    montelukast (SINGULAIR) 10 mg tablet, Take 1 tablet (10 mg total) by mouth every evening., Disp: 30 tablet, Rfl: 11    MYRBETRIQ 50 mg Tb24, Take 1 tablet (50 mg total) by mouth once daily., Disp: 90 tablet, Rfl: 0    nystatin (MYCOSTATIN) 100,000 unit/mL suspension, Take by mouth., Disp: , Rfl:     PLENVU 140-9-5.2 gram PPkS, Take as directed, Disp: , Rfl:     predniSONE (DELTASONE) 20 MG tablet, Take one pill per day for three days as needed for shortness of breath, wheezing, cough. May repeat as needed., Disp: 21 tablet, Rfl: 0    progesterone (PROMETRIUM) 200 MG capsule, TAKE 1 CAPSULE BY MOUTH ONCE IN THE MORNING, Disp: , Rfl:     rosuvastatin (CRESTOR) 5 MG tablet, TAKE 1 TABLET (5 MG TOTAL) BY MOUTH ONCE DAILY., Disp: 30 tablet, Rfl: 11    thyroid, pork, (ARMOUR THYROID) 30 mg Tab, 30 mg by Per G Tube route once daily., Disp: , Rfl:     benralizumab (FASENRA PEN) 30 mg/mL AtIn, Inject 1 mL into the skin every 8 weeks., Disp: 1 mL, Rfl: 5    benzonatate (TESSALON) 100 MG capsule, Take 1 capsule (100 mg total) by mouth 3 (three) times daily as needed for Cough. (Patient not taking: Reported on 10/2/2023), Disp: 30 capsule, Rfl: 2    cephALEXin (KEFLEX) 500 MG capsule, Take 1 capsule (500 mg total) by mouth every 8 (eight) hours., Disp: 24 capsule, Rfl: 0    mupirocin (BACTROBAN) 2 % ointment, Apply topically 3 (three) times daily., Disp: 22 g,  "Rfl: 0    predniSONE (DELTASONE) 20 MG tablet, Take three pills per day for three days. Two pills per day for three days. One pill per day for three days. (Patient not taking: Reported on 10/2/2023), Disp: 18 tablet, Rfl: 0    spironolactone (ALDACTONE) 100 MG tablet, , Disp: , Rfl:     Current Facility-Administered Medications:     acetaminophen tablet 650 mg, 650 mg, Oral, Once PRN, [place] Herrera PA-C    albuterol inhaler 2 puff, 2 puff, Inhalation, Q20 Min PRN, [place] Herrera PA-C    BUPivacaine injection 7.5 mg, 1.5 mL, Infiltration, 1 time in Clinic/HOD, Patrice Arredondo DPM    BUPivacaine injection 7.5 mg, 1.5 mL, Infiltration, 1 time in Clinic/HOD, Patrice Arredondo DPM    Review of Systems   Constitutional:  Negative for chills and fever.   Respiratory:  Negative for cough and shortness of breath.    Cardiovascular:  Negative for chest pain and leg swelling.   Skin:  Positive for wound.          Objective:      Vitals:    10/02/23 0823   BP: 128/72   Pulse: 74   SpO2: 97%   Weight: 76.8 kg (169 lb 6.4 oz)   Height: 5' 7" (1.702 m)     Physical Exam  Vitals and nursing note reviewed.   Constitutional:       General: She is not in acute distress.     Appearance: Normal appearance. She is well-developed. She is not ill-appearing.   HENT:      Mouth/Throat:      Tonsils: No tonsillar exudate.   Cardiovascular:      Rate and Rhythm: Normal rate and regular rhythm.      Heart sounds: Normal heart sounds.   Pulmonary:      Breath sounds: Normal breath sounds.   Lymphadenopathy:      Cervical: Cervical adenopathy present.   Skin:            Comments: Abscess to right upper shoulder. Fluctuant and indurated. Cleaned with betadine. Injected with local anesthetic. Pustular drainage expressed as well as cystular.            Assessment:       1. Sebaceous cyst    2. Mild persistent asthma without complication    3. Obstructive sleep apnea    4. Abscess         Plan:       Sebaceous cyst  -     " cephALEXin (KEFLEX) 500 MG capsule; Take 1 capsule (500 mg total) by mouth every 8 (eight) hours.  Dispense: 24 capsule; Refill: 0    Mild persistent asthma without complication    Obstructive sleep apnea    Abscess  Comments:  keep site clean and dry. call with any issues    Other orders  -     mupirocin (BACTROBAN) 2 % ointment; Apply topically 3 (three) times daily.  Dispense: 22 g; Refill: 0      Follow up if symptoms worsen or fail to improve, for medication management.        10/2/2023 Silvana Burch

## 2023-10-05 ENCOUNTER — PATIENT MESSAGE (OUTPATIENT)
Dept: ADMINISTRATIVE | Facility: OTHER | Age: 68
End: 2023-10-05
Payer: MEDICARE

## 2023-10-05 ENCOUNTER — PATIENT MESSAGE (OUTPATIENT)
Dept: CARDIOLOGY | Facility: CLINIC | Age: 68
End: 2023-10-05
Payer: MEDICARE

## 2023-10-06 DIAGNOSIS — J45.50 SEVERE PERSISTENT ASTHMA WITHOUT COMPLICATION: ICD-10-CM

## 2023-10-06 RX ORDER — BENRALIZUMAB 30 MG/ML
1 INJECTION, SOLUTION SUBCUTANEOUS
Qty: 1 ML | Refills: 1 | Status: CANCELLED | OUTPATIENT
Start: 2023-10-06

## 2023-10-08 ENCOUNTER — PATIENT MESSAGE (OUTPATIENT)
Dept: FAMILY MEDICINE | Facility: CLINIC | Age: 68
End: 2023-10-08

## 2023-10-10 ENCOUNTER — OFFICE VISIT (OUTPATIENT)
Dept: FAMILY MEDICINE | Facility: CLINIC | Age: 68
End: 2023-10-10
Payer: MEDICARE

## 2023-10-10 VITALS
OXYGEN SATURATION: 98 % | HEART RATE: 75 BPM | WEIGHT: 169.63 LBS | HEIGHT: 67 IN | SYSTOLIC BLOOD PRESSURE: 138 MMHG | BODY MASS INDEX: 26.62 KG/M2 | DIASTOLIC BLOOD PRESSURE: 64 MMHG

## 2023-10-10 DIAGNOSIS — M62.838 MUSCLE SPASM: ICD-10-CM

## 2023-10-10 DIAGNOSIS — E03.9 HYPOTHYROIDISM, UNSPECIFIED TYPE: ICD-10-CM

## 2023-10-10 DIAGNOSIS — M54.2 NECK PAIN: Primary | ICD-10-CM

## 2023-10-10 PROCEDURE — 1101F PT FALLS ASSESS-DOCD LE1/YR: CPT | Mod: CPTII,S$GLB,, | Performed by: NURSE PRACTITIONER

## 2023-10-10 PROCEDURE — 3008F BODY MASS INDEX DOCD: CPT | Mod: CPTII,S$GLB,, | Performed by: NURSE PRACTITIONER

## 2023-10-10 PROCEDURE — 3075F SYST BP GE 130 - 139MM HG: CPT | Mod: CPTII,S$GLB,, | Performed by: NURSE PRACTITIONER

## 2023-10-10 PROCEDURE — 1101F PR PT FALLS ASSESS DOC 0-1 FALLS W/OUT INJ PAST YR: ICD-10-PCS | Mod: CPTII,S$GLB,, | Performed by: NURSE PRACTITIONER

## 2023-10-10 PROCEDURE — 1159F MED LIST DOCD IN RCRD: CPT | Mod: CPTII,S$GLB,, | Performed by: NURSE PRACTITIONER

## 2023-10-10 PROCEDURE — 3288F FALL RISK ASSESSMENT DOCD: CPT | Mod: CPTII,S$GLB,, | Performed by: NURSE PRACTITIONER

## 2023-10-10 PROCEDURE — 3078F DIAST BP <80 MM HG: CPT | Mod: CPTII,S$GLB,, | Performed by: NURSE PRACTITIONER

## 2023-10-10 PROCEDURE — 99214 PR OFFICE/OUTPT VISIT, EST, LEVL IV, 30-39 MIN: ICD-10-PCS | Mod: 25,S$GLB,, | Performed by: NURSE PRACTITIONER

## 2023-10-10 PROCEDURE — 3078F PR MOST RECENT DIASTOLIC BLOOD PRESSURE < 80 MM HG: ICD-10-PCS | Mod: CPTII,S$GLB,, | Performed by: NURSE PRACTITIONER

## 2023-10-10 PROCEDURE — 96372 THER/PROPH/DIAG INJ SC/IM: CPT | Mod: S$GLB,,, | Performed by: NURSE PRACTITIONER

## 2023-10-10 PROCEDURE — 1160F RVW MEDS BY RX/DR IN RCRD: CPT | Mod: CPTII,S$GLB,, | Performed by: NURSE PRACTITIONER

## 2023-10-10 PROCEDURE — 3008F PR BODY MASS INDEX (BMI) DOCUMENTED: ICD-10-PCS | Mod: CPTII,S$GLB,, | Performed by: NURSE PRACTITIONER

## 2023-10-10 PROCEDURE — 96372 PR INJECTION,THERAP/PROPH/DIAG2ST, IM OR SUBCUT: ICD-10-PCS | Mod: S$GLB,,, | Performed by: NURSE PRACTITIONER

## 2023-10-10 PROCEDURE — 1160F PR REVIEW ALL MEDS BY PRESCRIBER/CLIN PHARMACIST DOCUMENTED: ICD-10-PCS | Mod: CPTII,S$GLB,, | Performed by: NURSE PRACTITIONER

## 2023-10-10 PROCEDURE — 1159F PR MEDICATION LIST DOCUMENTED IN MEDICAL RECORD: ICD-10-PCS | Mod: CPTII,S$GLB,, | Performed by: NURSE PRACTITIONER

## 2023-10-10 PROCEDURE — 99214 OFFICE O/P EST MOD 30 MIN: CPT | Mod: 25,S$GLB,, | Performed by: NURSE PRACTITIONER

## 2023-10-10 PROCEDURE — 3075F PR MOST RECENT SYSTOLIC BLOOD PRESS GE 130-139MM HG: ICD-10-PCS | Mod: CPTII,S$GLB,, | Performed by: NURSE PRACTITIONER

## 2023-10-10 PROCEDURE — 3288F PR FALLS RISK ASSESSMENT DOCUMENTED: ICD-10-PCS | Mod: CPTII,S$GLB,, | Performed by: NURSE PRACTITIONER

## 2023-10-10 RX ORDER — TIZANIDINE 4 MG/1
4 TABLET ORAL EVERY 8 HOURS
Qty: 30 TABLET | Refills: 0 | Status: SHIPPED | OUTPATIENT
Start: 2023-10-10 | End: 2023-10-20

## 2023-10-10 RX ORDER — DEXAMETHASONE SODIUM PHOSPHATE 4 MG/ML
8 INJECTION, SOLUTION INTRA-ARTICULAR; INTRALESIONAL; INTRAMUSCULAR; INTRAVENOUS; SOFT TISSUE ONCE
Status: COMPLETED | OUTPATIENT
Start: 2023-10-10 | End: 2023-10-10

## 2023-10-10 RX ORDER — METHYLPREDNISOLONE 4 MG/1
TABLET ORAL
Qty: 21 EACH | Refills: 0 | Status: SHIPPED | OUTPATIENT
Start: 2023-10-10 | End: 2023-10-31

## 2023-10-10 RX ADMIN — DEXAMETHASONE SODIUM PHOSPHATE 8 MG: 4 INJECTION, SOLUTION INTRA-ARTICULAR; INTRALESIONAL; INTRAMUSCULAR; INTRAVENOUS; SOFT TISSUE at 12:10

## 2023-10-17 ENCOUNTER — OFFICE VISIT (OUTPATIENT)
Dept: UROLOGY | Facility: CLINIC | Age: 68
End: 2023-10-17
Payer: MEDICARE

## 2023-10-17 VITALS
SYSTOLIC BLOOD PRESSURE: 162 MMHG | HEART RATE: 64 BPM | DIASTOLIC BLOOD PRESSURE: 78 MMHG | HEIGHT: 67 IN | BODY MASS INDEX: 25.74 KG/M2 | WEIGHT: 164 LBS

## 2023-10-17 DIAGNOSIS — R35.1 NOCTURIA: ICD-10-CM

## 2023-10-17 DIAGNOSIS — Z87.442 HISTORY OF NEPHROLITHIASIS: ICD-10-CM

## 2023-10-17 DIAGNOSIS — N32.81 OAB (OVERACTIVE BLADDER): Primary | ICD-10-CM

## 2023-10-17 LAB
BILIRUBIN, UA POC OHS: NEGATIVE
BLOOD, UA POC OHS: NEGATIVE
CLARITY, UA POC OHS: CLEAR
COLOR, UA POC OHS: YELLOW
GLUCOSE, UA POC OHS: NEGATIVE
KETONES, UA POC OHS: NEGATIVE
LEUKOCYTES, UA POC OHS: NEGATIVE
NITRITE, UA POC OHS: NEGATIVE
PH, UA POC OHS: 6.5
POC RESIDUAL URINE VOLUME: 15 ML (ref 0–100)
PROTEIN, UA POC OHS: NEGATIVE
SPECIFIC GRAVITY, UA POC OHS: 1.02
UROBILINOGEN, UA POC OHS: 0.2

## 2023-10-17 PROCEDURE — 81003 POCT URINALYSIS(INSTRUMENT): ICD-10-PCS | Mod: QW,S$GLB,, | Performed by: UROLOGY

## 2023-10-17 PROCEDURE — 99213 OFFICE O/P EST LOW 20 MIN: CPT | Mod: S$GLB,,, | Performed by: UROLOGY

## 2023-10-17 PROCEDURE — 1101F PR PT FALLS ASSESS DOC 0-1 FALLS W/OUT INJ PAST YR: ICD-10-PCS | Mod: CPTII,S$GLB,, | Performed by: UROLOGY

## 2023-10-17 PROCEDURE — 1160F RVW MEDS BY RX/DR IN RCRD: CPT | Mod: CPTII,S$GLB,, | Performed by: UROLOGY

## 2023-10-17 PROCEDURE — 99999 PR PBB SHADOW E&M-EST. PATIENT-LVL IV: CPT | Mod: PBBFAC,,, | Performed by: UROLOGY

## 2023-10-17 PROCEDURE — 99999 PR PBB SHADOW E&M-EST. PATIENT-LVL IV: ICD-10-PCS | Mod: PBBFAC,,, | Performed by: UROLOGY

## 2023-10-17 PROCEDURE — 51798 POCT BLADDER SCAN: ICD-10-PCS | Mod: S$GLB,,, | Performed by: UROLOGY

## 2023-10-17 PROCEDURE — 3288F PR FALLS RISK ASSESSMENT DOCUMENTED: ICD-10-PCS | Mod: CPTII,S$GLB,, | Performed by: UROLOGY

## 2023-10-17 PROCEDURE — 3077F SYST BP >= 140 MM HG: CPT | Mod: CPTII,S$GLB,, | Performed by: UROLOGY

## 2023-10-17 PROCEDURE — 3078F PR MOST RECENT DIASTOLIC BLOOD PRESSURE < 80 MM HG: ICD-10-PCS | Mod: CPTII,S$GLB,, | Performed by: UROLOGY

## 2023-10-17 PROCEDURE — 1126F PR PAIN SEVERITY QUANTIFIED, NO PAIN PRESENT: ICD-10-PCS | Mod: CPTII,S$GLB,, | Performed by: UROLOGY

## 2023-10-17 PROCEDURE — 1126F AMNT PAIN NOTED NONE PRSNT: CPT | Mod: CPTII,S$GLB,, | Performed by: UROLOGY

## 2023-10-17 PROCEDURE — 81003 URINALYSIS AUTO W/O SCOPE: CPT | Mod: QW,S$GLB,, | Performed by: UROLOGY

## 2023-10-17 PROCEDURE — 51798 US URINE CAPACITY MEASURE: CPT | Mod: S$GLB,,, | Performed by: UROLOGY

## 2023-10-17 PROCEDURE — 1159F MED LIST DOCD IN RCRD: CPT | Mod: CPTII,S$GLB,, | Performed by: UROLOGY

## 2023-10-17 PROCEDURE — 3078F DIAST BP <80 MM HG: CPT | Mod: CPTII,S$GLB,, | Performed by: UROLOGY

## 2023-10-17 PROCEDURE — 3288F FALL RISK ASSESSMENT DOCD: CPT | Mod: CPTII,S$GLB,, | Performed by: UROLOGY

## 2023-10-17 PROCEDURE — 99213 PR OFFICE/OUTPT VISIT, EST, LEVL III, 20-29 MIN: ICD-10-PCS | Mod: S$GLB,,, | Performed by: UROLOGY

## 2023-10-17 PROCEDURE — 3008F PR BODY MASS INDEX (BMI) DOCUMENTED: ICD-10-PCS | Mod: CPTII,S$GLB,, | Performed by: UROLOGY

## 2023-10-17 PROCEDURE — 3008F BODY MASS INDEX DOCD: CPT | Mod: CPTII,S$GLB,, | Performed by: UROLOGY

## 2023-10-17 PROCEDURE — 1160F PR REVIEW ALL MEDS BY PRESCRIBER/CLIN PHARMACIST DOCUMENTED: ICD-10-PCS | Mod: CPTII,S$GLB,, | Performed by: UROLOGY

## 2023-10-17 PROCEDURE — 3077F PR MOST RECENT SYSTOLIC BLOOD PRESSURE >= 140 MM HG: ICD-10-PCS | Mod: CPTII,S$GLB,, | Performed by: UROLOGY

## 2023-10-17 PROCEDURE — 1159F PR MEDICATION LIST DOCUMENTED IN MEDICAL RECORD: ICD-10-PCS | Mod: CPTII,S$GLB,, | Performed by: UROLOGY

## 2023-10-17 PROCEDURE — 1101F PT FALLS ASSESS-DOCD LE1/YR: CPT | Mod: CPTII,S$GLB,, | Performed by: UROLOGY

## 2023-10-17 RX ORDER — MIRABEGRON 50 MG/1
1 TABLET, FILM COATED, EXTENDED RELEASE ORAL DAILY
Qty: 90 TABLET | Refills: 3 | Status: SHIPPED | OUTPATIENT
Start: 2023-10-17

## 2023-10-17 NOTE — PROGRESS NOTES
[facility] Note - [place]  Staff: MD Peterson  PCP: Domo Gonzalez MD  Date of Service: 10/17/2023    In the future if she ever has urines she always needs catheterized urinalysis    Subjective:     HPI: Eboni Reeves is a 68 y.o. female     Interval history by me in CLINIC on 5/13/21 for nocturia:  She has a history of nocturnal urgency managed with Myrbetriq 50 mg which she has been on nightly for the past few years.  She has to wake up 10 times and now wakes up 3-4 times.  Only has daytime frequency of 3-4.    She has a history of interstitial cystitis because of symptoms of UTI with negative cultures in the past.  Not on any Estrace or UTIva, she has used azo in the past with success for IC flare.     She was eventually found to have a right proximal ureteral stone when she had a workup for gross hematuria recently.  She underwent cystoscopy and ureteroscopy on 05/05/2021.  The stone had migrated distally.  She remove the stent on Monday and was having continued crampy abdominal pain however she had increase in right flank pain and in nausea at and vomiting, both of which were new.  She went to the ER yesterday and was found to have of voided urine with leukocytes and blood and a cath urine which showed the same but much less.  A CT abdomen and pelvis with contrast showed possible ascending UTI versus inflammation.  She was given Rocephin and sent home on Augmentin.  Typically her UTI in symptoms include burning and not being able to urinate and these were the symptoms that prompted her ER visit.  This was her 1st stone.    She is taking Toradol around the clock, no Norco, still on Flomax 0.4 mg nightly, Myrbetriq 50 mg nightly.  Still having nausea at despite eating with the Toradol.  Pain 5/10 in the right flank, last took Toradol 2 hours ago, however prior to taking the Toradol pain in right lower quadrant had increased 8/10.  Has not taken azo in 2 days and has not been taking  oxybutynin.      Interval history by kim in CLINIC on 6/3/22 for uti sx:  presents today with c/o UTI symptoms at this time, therefore in clinic today for workup.  Pt with hx of UTIs, nocturnal urgency, kidney stones. Cath ucx 6/3/22 grew e.faecalis    Interval history by ME in CLINIC on 10/17/23 for f/u nocturia:  She had uti sx in august 2022. Cath ucx was neg. Since I saw her last 2 years ago she has been on myrbetriq.  She only wakes up 1-2 times a night previously was waking up 4-6 times a night.  She urinates about 3 times a day and has no incontinence.  When she has missed a dose she definitely wakes up more times.  She is not had a UTI in over a year.  No Interstitial Cystitis episodes in over a year. No stone episodes  Ua today neg. Pvr: 15  Taking care of her 88 and 88 yo parents.     Urine history:  10/17/23 Neg, pvr: 15  8/1/22  Ng  6/3/22  E.FAECALIS  5/13/21 Mod bld/small wbc  5/12/21 Cath: 2+bld/tr leuk, void: 1+leuk/3+bld/2+prot   5/3/21  Cath: 3+bld, void:          4/19/2021       ENTEROCOCCUS FAECALIS (A) , void: mod blood/tr leuk, cyt: neg   3/16/2020       No growth, void: neg    3/11/2020      2:01 PM Multiple organisms isolated. None in predominance.  Repeat if, void: 2+leuk   11/8/2019       STREPTOCOCCUS AGALACTIAE (GROUP B) (A) , void: 2+leuk   2/15/2017       ESCHERICHIA COLI . . .   10/10/2016       No growth   2/17/2016       No growth         REVIEW OF SYSTEMS: neg       Objective:     Vitals:    10/17/23 0812   BP: (!) 162/78   Pulse: 64        Focused Gu exam:     Recent Labs   Lab 10/06/22  0722 04/28/23  0722 05/02/23  1455   WBC 7.78 7.32 8.77   Hemoglobin 14.4 14.6 13.8   Hematocrit 44.5 45.5 42.6   Platelets 346 345 370   ]  Recent Labs   Lab 09/30/21  0728 10/06/22  0722 04/28/23  0722   Sodium 141 135 L 138   Potassium 4.2 4.2 4.3   Chloride 110 106 108   CO2 27 26 23   BUN 21 22 22   Creatinine 0.8 1.0 0.8   Glucose 90 104 108   Calcium 10.3 10.3 10.7 H   Alkaline  "Phosphatase 52 L 55 56   Total Protein 7.0 6.9 7.1   Albumin 4.1 4.1 4.2   Total Bilirubin 0.8 0.9 0.7   AST 18 20 23   ALT 21 22 26   ]    No results found for: "LABA1C", "HGBA1C"      Assessment:     Eboni Reeves is a 68 y.o. female with    1. OAB (overactive bladder)    2. History of nephrolithiasis    3. Nocturia          Plan:       continue Myrbetriq 50 mg and refilled for a year  Fu in a year or pcp can refill    Kelsey Winkler md      "

## 2023-10-17 NOTE — PATIENT INSTRUCTIONS
1. OAB (overactive bladder)    2. History of nephrolithiasis    3. Nocturia    continue Myrbetriq 50 mg and refilled for a year  Fu in a year or pcp can refill

## 2023-10-23 NOTE — PROGRESS NOTES
SUBJECTIVE:    Patient ID: Eboni Reeves is a 68 y.o. female.    Chief Complaint: Follow-up (No bottles//Pt is here for a check up on her cyst, pt states that the cyst is doing well. Pt c/o aches in there arms, neck and shoulders x 10 days//Decline flu and pna vaccine//ALEX )    68-year-old female presents today for urgent evaluation.  Treated for gerd, asthma, oab.   She was seen in office about 8 days ago with sebaceous cyst. Reports that site itself has cleared but now has neck pain. Worse with movement. No drainage. No fever. Pain is worse with movement        Hospital Outpatient Visit on 08/11/2023   Component Date Value Ref Range Status    POC Creatinine 08/11/2023 0.9  0.5 - 1.4 mg/dL Final    Sample 08/11/2023 VENOUS   Final   Lab Visit on 05/02/2023   Component Date Value Ref Range Status    WBC 05/02/2023 8.77  3.90 - 12.70 K/uL Final    RBC 05/02/2023 4.71  4.00 - 5.40 M/uL Final    Hemoglobin 05/02/2023 13.8  12.0 - 16.0 g/dL Final    Hematocrit 05/02/2023 42.6  37.0 - 48.5 % Final    MCV 05/02/2023 90  82 - 98 fL Final    MCH 05/02/2023 29.3  27.0 - 31.0 pg Final    MCHC 05/02/2023 32.4  32.0 - 36.0 g/dL Final    RDW 05/02/2023 13.4  11.5 - 14.5 % Final    Platelets 05/02/2023 370  150 - 450 K/uL Final    MPV 05/02/2023 10.0  9.2 - 12.9 fL Final    Immature Granulocytes 05/02/2023 0.2  0.0 - 0.5 % Final    Gran # (ANC) 05/02/2023 5.5  1.8 - 7.7 K/uL Final    Immature Grans (Abs) 05/02/2023 0.02  0.00 - 0.04 K/uL Final    Lymph # 05/02/2023 2.1  1.0 - 4.8 K/uL Final    Mono # 05/02/2023 0.9  0.3 - 1.0 K/uL Final    Eos # 05/02/2023 0.1  0.0 - 0.5 K/uL Final    Baso # 05/02/2023 0.08  0.00 - 0.20 K/uL Final    nRBC 05/02/2023 0  0 /100 WBC Final    Gran % 05/02/2023 62.7  38.0 - 73.0 % Final    Lymph % 05/02/2023 24.3  18.0 - 48.0 % Final    Mono % 05/02/2023 10.5  4.0 - 15.0 % Final    Eosinophil % 05/02/2023 1.4  0.0 - 8.0 % Final    Basophil % 05/02/2023 0.9  0.0 - 1.9 % Final    Differential Method  05/02/2023 Automated   Final   Lab Visit on 04/28/2023   Component Date Value Ref Range Status    WBC 04/28/2023 7.32  3.90 - 12.70 K/uL Final    RBC 04/28/2023 5.03  4.00 - 5.40 M/uL Final    Hemoglobin 04/28/2023 14.6  12.0 - 16.0 g/dL Final    Hematocrit 04/28/2023 45.5  37.0 - 48.5 % Final    MCV 04/28/2023 91  82 - 98 fL Final    MCH 04/28/2023 29.0  27.0 - 31.0 pg Final    MCHC 04/28/2023 32.1  32.0 - 36.0 g/dL Final    RDW 04/28/2023 13.6  11.5 - 14.5 % Final    Platelets 04/28/2023 345  150 - 450 K/uL Final    MPV 04/28/2023 9.7  9.2 - 12.9 fL Final    Sodium 04/28/2023 138  136 - 145 mmol/L Final    Potassium 04/28/2023 4.3  3.5 - 5.1 mmol/L Final    Chloride 04/28/2023 108  95 - 110 mmol/L Final    CO2 04/28/2023 23  23 - 29 mmol/L Final    Glucose 04/28/2023 108  70 - 110 mg/dL Final    BUN 04/28/2023 22  8 - 23 mg/dL Final    Creatinine 04/28/2023 0.8  0.5 - 1.4 mg/dL Final    Calcium 04/28/2023 10.7 (H)  8.7 - 10.5 mg/dL Final    Total Protein 04/28/2023 7.1  6.0 - 8.4 g/dL Final    Albumin 04/28/2023 4.2  3.5 - 5.2 g/dL Final    Total Bilirubin 04/28/2023 0.7  0.1 - 1.0 mg/dL Final    Alkaline Phosphatase 04/28/2023 56  55 - 135 U/L Final    AST 04/28/2023 23  10 - 40 U/L Final    ALT 04/28/2023 26  10 - 44 U/L Final    Anion Gap 04/28/2023 7 (L)  8 - 16 mmol/L Final    eGFR 04/28/2023 >60.0  >60 mL/min/1.73 m^2 Final    Cholesterol 04/28/2023 159  120 - 199 mg/dL Final    Triglycerides 04/28/2023 93  30 - 150 mg/dL Final    HDL 04/28/2023 79 (H)  40 - 75 mg/dL Final    LDL Cholesterol 04/28/2023 61.4 (L)  63.0 - 159.0 mg/dL Final    HDL/Cholesterol Ratio 04/28/2023 49.7  20.0 - 50.0 % Final    Total Cholesterol/HDL Ratio 04/28/2023 2.0  2.0 - 5.0 Final    Non-HDL Cholesterol 04/28/2023 80  mg/dL Final    TSH 04/28/2023 1.440  0.340 - 5.600 uIU/mL Final       Past Medical History:   Diagnosis Date    Asthma     Cystitis, interstitial     DVT (deep venous thrombosis)     Encounter for blood transfusion      Fx     RIGHT ANKLE    Hyperlipidemia     LVH (left ventricular hypertrophy)     Renal disorder     STONE    Stroke     AGE 20    TMJ (dislocation of temporomandibular joint)     Urinary tract infection      Social History     Socioeconomic History    Marital status:    Tobacco Use    Smoking status: Never     Passive exposure: Past    Smokeless tobacco: Never   Substance and Sexual Activity    Alcohol use: Yes    Drug use: No     Past Surgical History:   Procedure Laterality Date    APPENDECTOMY      BLADDER SURGERY      CYSTOURETEROSCOPY WITH RETROGRADE PYELOGRAPHY AND INSERTION OF STENT INTO URETER Right 5/5/2021    Procedure: CYSTOURETEROSCOPY, WITH RETROGRADE PYELOGRAM AND URETERAL STENT INSERTION;  Surgeon: Kelsey Winkler MD;  Location: [facility] OR;  Service: Urology;  Laterality: Right;    HYSTERECTOMY      partial hysterectomy    LASER LITHOTRIPSY Right 5/5/2021    Procedure: LITHOTRIPSY, USING LASER;  Surgeon: Kelsey Winkler MD;  Location: [facility] OR;  Service: Urology;  Laterality: Right;    MANDIBLE FRACTURE SURGERY      TONSILLECTOMY      WRIST SURGERY      right wrist, had plate put in     Family History   Problem Relation Age of Onset    Urolithiasis Father     Prostate cancer Paternal Uncle     Breast cancer Maternal Grandmother     Kidney cancer Neg Hx        All of your core healthy metrics are met.      Review of patient's allergies indicates:  No Known Allergies    Current Outpatient Medications:     albuterol (ACCUNEB) 1.25 mg/3 mL Nebu, Take 3 mLs (1.25 mg total) by nebulization every 6 (six) hours as needed (Shortness of breath, wheezing, cough). Rescue, Disp: 75 mL, Rfl: 11    albuterol (PROVENTIL/VENTOLIN HFA) 90 mcg/actuation inhaler, Inhale 2 puffs into the lungs every 4 (four) hours as needed for Wheezing or Shortness of Breath. Rescue, Disp: 18 g, Rfl: 11    benralizumab (FASENRA PEN) 30 mg/mL AtIn, Inject 1 mL into the skin every 28 days., Disp: 1 mL, Rfl: 1     benralizumab (FASENRA PEN) 30 mg/mL AtIn, Inject 1 mL into the skin every 8 weeks., Disp: 1 mL, Rfl: 5    benzonatate (TESSALON) 100 MG capsule, Take 1 capsule (100 mg total) by mouth 3 (three) times daily as needed for Cough., Disp: 30 capsule, Rfl: 2    cephALEXin (KEFLEX) 500 MG capsule, Take 1 capsule (500 mg total) by mouth every 8 (eight) hours., Disp: 24 capsule, Rfl: 0    clotrimazole-betamethasone 1-0.05% (LOTRISONE) cream, Apply topically 2 (two) times daily., Disp: 45 g, Rfl: 1    fluticasone furoate-vilanteroL (BREO ELLIPTA) 200-25 mcg/dose DsDv diskus inhaler, Inhale 1 puff into the lungs once daily. Controller, Disp: 180 each, Rfl: 3    fluticasone propionate (FLONASE) 50 mcg/actuation nasal spray, 1 spray (50 mcg total) by Each Nostril route once daily., Disp: 16 g, Rfl: 11    lansoprazole (PREVACID) 30 MG capsule, Take 30 mg by mouth., Disp: , Rfl:     meclizine (ANTIVERT) 25 mg tablet, Take 1 tablet (25 mg total) by mouth 3 (three) times daily as needed for Dizziness., Disp: 30 tablet, Rfl: 0    methylPREDNISolone (MEDROL DOSEPACK) 4 mg tablet, use as directed, Disp: 21 each, Rfl: 0    montelukast (SINGULAIR) 10 mg tablet, Take 1 tablet (10 mg total) by mouth every evening., Disp: 30 tablet, Rfl: 11    mupirocin (BACTROBAN) 2 % ointment, Apply topically 3 (three) times daily., Disp: 22 g, Rfl: 0    MYRBETRIQ 50 mg Tb24, Take 1 tablet (50 mg total) by mouth once daily., Disp: 90 tablet, Rfl: 3    nystatin (MYCOSTATIN) 100,000 unit/mL suspension, Take by mouth., Disp: , Rfl:     PLENVU 140-9-5.2 gram PPkS, Take as directed, Disp: , Rfl:     predniSONE (DELTASONE) 20 MG tablet, Take one pill per day for three days as needed for shortness of breath, wheezing, cough. May repeat as needed., Disp: 21 tablet, Rfl: 0    predniSONE (DELTASONE) 20 MG tablet, Take three pills per day for three days. Two pills per day for three days. One pill per day for three days., Disp: 18 tablet, Rfl: 0    progesterone  "(PROMETRIUM) 200 MG capsule, TAKE 1 CAPSULE BY MOUTH ONCE IN THE MORNING, Disp: , Rfl:     rosuvastatin (CRESTOR) 5 MG tablet, TAKE 1 TABLET (5 MG TOTAL) BY MOUTH ONCE DAILY., Disp: 30 tablet, Rfl: 11    spironolactone (ALDACTONE) 100 MG tablet, , Disp: , Rfl:     thyroid, pork, (ARMOUR THYROID) 30 mg Tab, 30 mg by Per G Tube route once daily., Disp: , Rfl:   No current facility-administered medications for this visit.    Review of Systems   Constitutional:  Negative for chills, fever and unexpected weight change.   HENT:  Negative for ear pain, rhinorrhea and sore throat.    Respiratory:  Negative for cough and shortness of breath.    Cardiovascular:  Negative for chest pain, palpitations and leg swelling.   Gastrointestinal:  Negative for abdominal pain, diarrhea, nausea and vomiting.   Genitourinary:  Negative for vaginal bleeding.   Musculoskeletal:  Positive for neck pain. Negative for arthralgias.   Skin:  Negative for rash.   Neurological:  Negative for dizziness, weakness and headaches.   Psychiatric/Behavioral:  Negative for agitation and sleep disturbance. The patient is not nervous/anxious.           Objective:      Vitals:    10/10/23 1218   BP: 138/64   Pulse: 75   SpO2: 98%   Weight: 76.9 kg (169 lb 9.6 oz)   Height: 5' 7" (1.702 m)     Physical Exam  Vitals and nursing note reviewed.   Constitutional:       General: She is not in acute distress.     Appearance: Normal appearance. She is well-developed.   Cardiovascular:      Rate and Rhythm: Normal rate and regular rhythm.      Heart sounds: No murmur heard.     No friction rub. No gallop.   Pulmonary:      Breath sounds: Normal breath sounds. No wheezing or rales.   Musculoskeletal:        Arms:       Cervical back: Spasms and tenderness present. Normal range of motion.      Lumbar back: No spasms, tenderness or bony tenderness. Normal range of motion.      Comments: Negative bilat SLR   Skin:     Findings: No rash.   Neurological:      Mental " Status: She is alert and oriented to person, place, and time.      Sensory: No sensory deficit.      Gait: Gait normal.           Assessment:       1. Neck pain    2. Muscle spasm    3. Hypothyroidism, unspecified type         Plan:       Neck pain  Comments:  medrol    Muscle spasm  Comments:  zanaflex    Hypothyroidism, unspecified type    Other orders  -     dexAMETHasone injection 8 mg  -     methylPREDNISolone (MEDROL DOSEPACK) 4 mg tablet; use as directed  Dispense: 21 each; Refill: 0  -     tiZANidine (ZANAFLEX) 4 MG tablet; Take 1 tablet (4 mg total) by mouth every 8 (eight) hours. for 10 days  Dispense: 30 tablet; Refill: 0      Follow up if symptoms worsen or fail to improve, for medication management.        10/23/2023 Silvana Burch

## 2023-10-31 RX ORDER — ROSUVASTATIN CALCIUM 5 MG/1
5 TABLET, COATED ORAL DAILY
Qty: 90 TABLET | Refills: 0 | Status: SHIPPED | OUTPATIENT
Start: 2023-10-31 | End: 2024-01-29

## 2023-11-02 ENCOUNTER — LAB VISIT (OUTPATIENT)
Dept: LAB | Facility: HOSPITAL | Age: 68
End: 2023-11-02
Attending: INTERNAL MEDICINE
Payer: MEDICARE

## 2023-11-02 DIAGNOSIS — J45.30 MILD PERSISTENT ASTHMA WITHOUT COMPLICATION: ICD-10-CM

## 2023-11-02 DIAGNOSIS — I49.9 VENTRICULAR ARRHYTHMIA: ICD-10-CM

## 2023-11-02 DIAGNOSIS — R93.1 ELEVATED CORONARY ARTERY CALCIUM SCORE: ICD-10-CM

## 2023-11-02 DIAGNOSIS — E78.00 HYPERCHOLESTEROLEMIA: ICD-10-CM

## 2023-11-02 LAB
ALBUMIN SERPL BCP-MCNC: 4.4 G/DL (ref 3.5–5.2)
ALP SERPL-CCNC: 63 U/L (ref 55–135)
ALT SERPL W/O P-5'-P-CCNC: 21 U/L (ref 10–44)
ANION GAP SERPL CALC-SCNC: 4 MMOL/L (ref 8–16)
AST SERPL-CCNC: 19 U/L (ref 10–40)
BILIRUB SERPL-MCNC: 0.6 MG/DL (ref 0.1–1)
BUN SERPL-MCNC: 22 MG/DL (ref 8–23)
CALCIUM SERPL-MCNC: 10.8 MG/DL (ref 8.7–10.5)
CHLORIDE SERPL-SCNC: 105 MMOL/L (ref 95–110)
CHOLEST SERPL-MCNC: 202 MG/DL (ref 120–199)
CHOLEST/HDLC SERPL: 2.5 {RATIO} (ref 2–5)
CO2 SERPL-SCNC: 28 MMOL/L (ref 23–29)
CREAT SERPL-MCNC: 1 MG/DL (ref 0.5–1.4)
ERYTHROCYTE [DISTWIDTH] IN BLOOD BY AUTOMATED COUNT: 14 % (ref 11.5–14.5)
EST. GFR  (NO RACE VARIABLE): >60 ML/MIN/1.73 M^2
GLUCOSE SERPL-MCNC: 99 MG/DL (ref 70–110)
HCT VFR BLD AUTO: 45.6 % (ref 37–48.5)
HDLC SERPL-MCNC: 82 MG/DL (ref 40–75)
HDLC SERPL: 40.6 % (ref 20–50)
HGB BLD-MCNC: 14.7 G/DL (ref 12–16)
LDLC SERPL CALC-MCNC: 104 MG/DL (ref 63–159)
MCH RBC QN AUTO: 29.5 PG (ref 27–31)
MCHC RBC AUTO-ENTMCNC: 32.2 G/DL (ref 32–36)
MCV RBC AUTO: 91 FL (ref 82–98)
NONHDLC SERPL-MCNC: 120 MG/DL
PLATELET # BLD AUTO: 346 K/UL (ref 150–450)
PMV BLD AUTO: 9.8 FL (ref 9.2–12.9)
POTASSIUM SERPL-SCNC: 4.6 MMOL/L (ref 3.5–5.1)
PROT SERPL-MCNC: 7.2 G/DL (ref 6–8.4)
RBC # BLD AUTO: 4.99 M/UL (ref 4–5.4)
SODIUM SERPL-SCNC: 137 MMOL/L (ref 136–145)
TRIGL SERPL-MCNC: 80 MG/DL (ref 30–150)
TSH SERPL DL<=0.005 MIU/L-ACNC: 2.24 UIU/ML (ref 0.34–5.6)
WBC # BLD AUTO: 5.22 K/UL (ref 3.9–12.7)

## 2023-11-02 PROCEDURE — 80061 LIPID PANEL: CPT | Performed by: INTERNAL MEDICINE

## 2023-11-02 PROCEDURE — 84443 ASSAY THYROID STIM HORMONE: CPT | Performed by: INTERNAL MEDICINE

## 2023-11-02 PROCEDURE — 36415 COLL VENOUS BLD VENIPUNCTURE: CPT | Performed by: INTERNAL MEDICINE

## 2023-11-02 PROCEDURE — 80053 COMPREHEN METABOLIC PANEL: CPT | Performed by: INTERNAL MEDICINE

## 2023-11-02 PROCEDURE — 85027 COMPLETE CBC AUTOMATED: CPT | Performed by: INTERNAL MEDICINE

## 2023-11-03 ENCOUNTER — PATIENT MESSAGE (OUTPATIENT)
Dept: UROLOGY | Facility: CLINIC | Age: 68
End: 2023-11-03
Payer: MEDICARE

## 2023-11-03 DIAGNOSIS — R10.9 ABDOMINAL PAIN, UNSPECIFIED ABDOMINAL LOCATION: Primary | ICD-10-CM

## 2023-11-06 ENCOUNTER — OFFICE VISIT (OUTPATIENT)
Dept: UROLOGY | Facility: CLINIC | Age: 68
End: 2023-11-06
Payer: MEDICARE

## 2023-11-06 DIAGNOSIS — R39.9 UTI SYMPTOMS: Primary | ICD-10-CM

## 2023-11-06 DIAGNOSIS — R10.9 ABDOMINAL PAIN, UNSPECIFIED ABDOMINAL LOCATION: ICD-10-CM

## 2023-11-06 LAB
AMORPH CRY UR QL COMP ASSIST: ABNORMAL
BILIRUBIN, UA POC OHS: NEGATIVE
BLOOD, UA POC OHS: NEGATIVE
CLARITY, UA POC OHS: CLEAR
COLOR, UA POC OHS: YELLOW
GLUCOSE, UA POC OHS: NEGATIVE
KETONES, UA POC OHS: NEGATIVE
LEUKOCYTES, UA POC OHS: ABNORMAL
MICROSCOPIC COMMENT: ABNORMAL
NITRITE, UA POC OHS: NEGATIVE
PH, UA POC OHS: 6
PROTEIN, UA POC OHS: NEGATIVE
RBC #/AREA URNS AUTO: 1 /HPF (ref 0–4)
SPECIFIC GRAVITY, UA POC OHS: 1.02
SQUAMOUS #/AREA URNS AUTO: 8 /HPF
UROBILINOGEN, UA POC OHS: 1
WBC #/AREA URNS AUTO: 8 /HPF (ref 0–5)

## 2023-11-06 PROCEDURE — 99213 OFFICE O/P EST LOW 20 MIN: CPT | Mod: S$GLB,,, | Performed by: NURSE PRACTITIONER

## 2023-11-06 PROCEDURE — 1101F PT FALLS ASSESS-DOCD LE1/YR: CPT | Mod: CPTII,S$GLB,, | Performed by: NURSE PRACTITIONER

## 2023-11-06 PROCEDURE — 99213 PR OFFICE/OUTPT VISIT, EST, LEVL III, 20-29 MIN: ICD-10-PCS | Mod: S$GLB,,, | Performed by: NURSE PRACTITIONER

## 2023-11-06 PROCEDURE — 87088 URINE BACTERIA CULTURE: CPT | Performed by: NURSE PRACTITIONER

## 2023-11-06 PROCEDURE — 99999 PR PBB SHADOW E&M-EST. PATIENT-LVL III: ICD-10-PCS | Mod: PBBFAC,,, | Performed by: NURSE PRACTITIONER

## 2023-11-06 PROCEDURE — 1160F PR REVIEW ALL MEDS BY PRESCRIBER/CLIN PHARMACIST DOCUMENTED: ICD-10-PCS | Mod: CPTII,S$GLB,, | Performed by: NURSE PRACTITIONER

## 2023-11-06 PROCEDURE — 87086 URINE CULTURE/COLONY COUNT: CPT | Performed by: NURSE PRACTITIONER

## 2023-11-06 PROCEDURE — 81001 URINALYSIS AUTO W/SCOPE: CPT | Performed by: NURSE PRACTITIONER

## 2023-11-06 PROCEDURE — 1159F PR MEDICATION LIST DOCUMENTED IN MEDICAL RECORD: ICD-10-PCS | Mod: CPTII,S$GLB,, | Performed by: NURSE PRACTITIONER

## 2023-11-06 PROCEDURE — 87186 SC STD MICRODIL/AGAR DIL: CPT | Performed by: NURSE PRACTITIONER

## 2023-11-06 PROCEDURE — 87077 CULTURE AEROBIC IDENTIFY: CPT | Performed by: NURSE PRACTITIONER

## 2023-11-06 PROCEDURE — 1101F PR PT FALLS ASSESS DOC 0-1 FALLS W/OUT INJ PAST YR: ICD-10-PCS | Mod: CPTII,S$GLB,, | Performed by: NURSE PRACTITIONER

## 2023-11-06 PROCEDURE — 1159F MED LIST DOCD IN RCRD: CPT | Mod: CPTII,S$GLB,, | Performed by: NURSE PRACTITIONER

## 2023-11-06 PROCEDURE — 1160F RVW MEDS BY RX/DR IN RCRD: CPT | Mod: CPTII,S$GLB,, | Performed by: NURSE PRACTITIONER

## 2023-11-06 PROCEDURE — 3288F FALL RISK ASSESSMENT DOCD: CPT | Mod: CPTII,S$GLB,, | Performed by: NURSE PRACTITIONER

## 2023-11-06 PROCEDURE — 3288F PR FALLS RISK ASSESSMENT DOCUMENTED: ICD-10-PCS | Mod: CPTII,S$GLB,, | Performed by: NURSE PRACTITIONER

## 2023-11-06 PROCEDURE — 81003 POCT URINALYSIS(INSTRUMENT): ICD-10-PCS | Mod: QW,S$GLB,, | Performed by: NURSE PRACTITIONER

## 2023-11-06 PROCEDURE — 99999 PR PBB SHADOW E&M-EST. PATIENT-LVL III: CPT | Mod: PBBFAC,,, | Performed by: NURSE PRACTITIONER

## 2023-11-06 PROCEDURE — 81003 URINALYSIS AUTO W/O SCOPE: CPT | Mod: QW,S$GLB,, | Performed by: NURSE PRACTITIONER

## 2023-11-06 RX ORDER — NITROFURANTOIN 25; 75 MG/1; MG/1
100 CAPSULE ORAL 2 TIMES DAILY
Qty: 14 CAPSULE | Refills: 0 | Status: SHIPPED | OUTPATIENT
Start: 2023-11-06 | End: 2023-11-13

## 2023-11-06 NOTE — PROGRESS NOTES
[facility] Note  Staff: VIRGEN ByrdC    PCP: MD Carlos  Urologist:  MD Peterson    Chief Complaint: UTI symptoms    Subjective:        HPI: Eboni Reeves is a 68 y.o. female presents today with c/o bladder infection.  Pt was recently evaluated by Dr. Winkler on 10/17/23.    Interval history by Peterson in CLINIC on 10/17/23 for f/u nocturia:  She had uti sx in august 2022. Cath ucx was neg. Since I saw her last 2 years ago she has been on myrbetriq.  She only wakes up 1-2 times a night previously was waking up 4-6 times a night.  She urinates about 3 times a day and has no incontinence.  When she has missed a dose she definitely wakes up more times.  She is not had a UTI in over a year.  No Interstitial Cystitis episodes in over a year. No stone episodes  Ua today neg. Pvr: 15  Taking care of her 88 and 88 yo parents.      Urine history:  10/17/23          Neg, pvr: 15  8/1/22              Ng  6/3/22              E.FAECALIS  5/13/21            Mod bld/small wbc  5/12/21            Cath: 2+bld/tr leuk, void: 1+leuk/3+bld/2+prot   5/3/21              Cath: 3+bld, void:     UA today is trace leuks, otherwise normal findings.  Also c/o right lower quadrant abdominal pain.  She has been taking AZO prn pain.  No gross hematuria noted by pt.    REVIEW OF SYSTEMS:  A comprehensive 10 system review was performed and is negative except as noted above in HPI    PMHx:  Past Medical History:   Diagnosis Date    Asthma     Cystitis, interstitial     DVT (deep venous thrombosis)     Encounter for blood transfusion     Fx     RIGHT ANKLE    Hyperlipidemia     LVH (left ventricular hypertrophy)     Renal disorder     STONE    Stroke     AGE 20    TMJ (dislocation of temporomandibular joint)     Urinary tract infection      PSHx:  Past Surgical History:   Procedure Laterality Date    APPENDECTOMY      BLADDER SURGERY      CYSTOURETEROSCOPY WITH RETROGRADE PYELOGRAPHY AND INSERTION OF STENT  INTO URETER Right 5/5/2021    Procedure: CYSTOURETEROSCOPY, WITH RETROGRADE PYELOGRAM AND URETERAL STENT INSERTION;  Surgeon: Kelsey Winkler MD;  Location: [facility] OR;  Service: Urology;  Laterality: Right;    HYSTERECTOMY      partial hysterectomy    LASER LITHOTRIPSY Right 5/5/2021    Procedure: LITHOTRIPSY, USING LASER;  Surgeon: Kelsey Winkler MD;  Location: [facility] OR;  Service: Urology;  Laterality: Right;    MANDIBLE FRACTURE SURGERY      TONSILLECTOMY      WRIST SURGERY      right wrist, had plate put in     Allergies:  Patient has no known allergies.    Medications: reviewed   Objective:   There were no vitals filed for this visit.    Physical Exam  Constitutional:       Appearance: She is well-developed.   HENT:      Head: Normocephalic and atraumatic.   Eyes:      Conjunctiva/sclera: Conjunctivae normal.      Pupils: Pupils are equal, round, and reactive to light.   Cardiovascular:      Rate and Rhythm: Normal rate and regular rhythm.      Heart sounds: Normal heart sounds.   Pulmonary:      Effort: Pulmonary effort is normal.      Breath sounds: Normal breath sounds.   Abdominal:      General: Bowel sounds are normal.      Palpations: Abdomen is soft.   Musculoskeletal:         General: Normal range of motion.      Cervical back: Normal range of motion and neck supple.   Skin:     General: Skin is warm and dry.   Neurological:      Mental Status: She is alert and oriented to person, place, and time.      Deep Tendon Reflexes: Reflexes are normal and symmetric.   Psychiatric:         Behavior: Behavior normal.         Thought Content: Thought content normal.         Judgment: Judgment normal.       Assessment:       1. UTI symptoms          Plan:      UA Micro and Urine Culture to be processed today.  Macrobid 100 mg BID prescribed to pt during ov today for possible infection.  Discussed conservative measures to control urgency and frequency including avoiding bladder irritants, timed  voiding, not postponing voiding, and bowel regimen (as distended bowel has extrinsic compressive effect on bladder. Discussed bladder irritants include coffe (even decaf), tea, alcohol, soda, spicy foods, acidic juices (orange, tomato), vinegar, and artificial sweeteners.     F/u TBD    [facility]: Active    Monalisa Marin, VIRGENC

## 2023-11-08 LAB — BACTERIA UR CULT: ABNORMAL

## 2023-11-09 ENCOUNTER — TELEPHONE (OUTPATIENT)
Dept: CARDIOLOGY | Facility: CLINIC | Age: 68
End: 2023-11-09
Payer: MEDICARE

## 2023-11-09 NOTE — TELEPHONE ENCOUNTER
----- Message from Rl Miller sent at 11/9/2023 10:02 AM CST -----  Regarding: Return Call  Contact: patient  Type:  Patient Returning Call    Who Called:patient  Who Left Message for Patient:office staff  Does the patient know what this is regarding?:upcoming appointment/ Way was it switched to [place]/ Patient sees MD in [place]/ please call today to explain  Would the patient rather a call back or a response via [facility]? Please call  Best Call Back Number:[phone]  Additional Information: This is for Honey Reeves

## 2023-11-10 ENCOUNTER — OFFICE VISIT (OUTPATIENT)
Dept: CARDIOLOGY | Facility: CLINIC | Age: 68
End: 2023-11-10
Payer: MEDICARE

## 2023-11-10 VITALS
HEART RATE: 80 BPM | HEIGHT: 67 IN | DIASTOLIC BLOOD PRESSURE: 80 MMHG | BODY MASS INDEX: 26.89 KG/M2 | SYSTOLIC BLOOD PRESSURE: 130 MMHG | OXYGEN SATURATION: 96 % | WEIGHT: 171.31 LBS

## 2023-11-10 DIAGNOSIS — I49.9 VENTRICULAR ARRHYTHMIA: ICD-10-CM

## 2023-11-10 DIAGNOSIS — E78.00 HYPERCHOLESTEROLEMIA: Primary | ICD-10-CM

## 2023-11-10 DIAGNOSIS — R93.1 ELEVATED CORONARY ARTERY CALCIUM SCORE: Chronic | ICD-10-CM

## 2023-11-10 DIAGNOSIS — J45.30 MILD PERSISTENT ASTHMA WITHOUT COMPLICATION: ICD-10-CM

## 2023-11-10 PROCEDURE — 99999 PR PBB SHADOW E&M-EST. PATIENT-LVL V: ICD-10-PCS | Mod: PBBFAC,,, | Performed by: INTERNAL MEDICINE

## 2023-11-10 PROCEDURE — 1101F PT FALLS ASSESS-DOCD LE1/YR: CPT | Mod: CPTII,S$GLB,, | Performed by: INTERNAL MEDICINE

## 2023-11-10 PROCEDURE — 99212 OFFICE O/P EST SF 10 MIN: CPT | Mod: S$GLB,,, | Performed by: INTERNAL MEDICINE

## 2023-11-10 PROCEDURE — 1126F AMNT PAIN NOTED NONE PRSNT: CPT | Mod: CPTII,S$GLB,, | Performed by: INTERNAL MEDICINE

## 2023-11-10 PROCEDURE — 1160F PR REVIEW ALL MEDS BY PRESCRIBER/CLIN PHARMACIST DOCUMENTED: ICD-10-PCS | Mod: CPTII,S$GLB,, | Performed by: INTERNAL MEDICINE

## 2023-11-10 PROCEDURE — 1126F PR PAIN SEVERITY QUANTIFIED, NO PAIN PRESENT: ICD-10-PCS | Mod: CPTII,S$GLB,, | Performed by: INTERNAL MEDICINE

## 2023-11-10 PROCEDURE — 1160F RVW MEDS BY RX/DR IN RCRD: CPT | Mod: CPTII,S$GLB,, | Performed by: INTERNAL MEDICINE

## 2023-11-10 PROCEDURE — 3075F PR MOST RECENT SYSTOLIC BLOOD PRESS GE 130-139MM HG: ICD-10-PCS | Mod: CPTII,S$GLB,, | Performed by: INTERNAL MEDICINE

## 2023-11-10 PROCEDURE — 3008F PR BODY MASS INDEX (BMI) DOCUMENTED: ICD-10-PCS | Mod: CPTII,S$GLB,, | Performed by: INTERNAL MEDICINE

## 2023-11-10 PROCEDURE — 99999 PR PBB SHADOW E&M-EST. PATIENT-LVL V: CPT | Mod: PBBFAC,,, | Performed by: INTERNAL MEDICINE

## 2023-11-10 PROCEDURE — 3079F DIAST BP 80-89 MM HG: CPT | Mod: CPTII,S$GLB,, | Performed by: INTERNAL MEDICINE

## 2023-11-10 PROCEDURE — 1159F PR MEDICATION LIST DOCUMENTED IN MEDICAL RECORD: ICD-10-PCS | Mod: CPTII,S$GLB,, | Performed by: INTERNAL MEDICINE

## 2023-11-10 PROCEDURE — 3288F PR FALLS RISK ASSESSMENT DOCUMENTED: ICD-10-PCS | Mod: CPTII,S$GLB,, | Performed by: INTERNAL MEDICINE

## 2023-11-10 PROCEDURE — 3008F BODY MASS INDEX DOCD: CPT | Mod: CPTII,S$GLB,, | Performed by: INTERNAL MEDICINE

## 2023-11-10 PROCEDURE — 1159F MED LIST DOCD IN RCRD: CPT | Mod: CPTII,S$GLB,, | Performed by: INTERNAL MEDICINE

## 2023-11-10 PROCEDURE — 1101F PR PT FALLS ASSESS DOC 0-1 FALLS W/OUT INJ PAST YR: ICD-10-PCS | Mod: CPTII,S$GLB,, | Performed by: INTERNAL MEDICINE

## 2023-11-10 PROCEDURE — 99212 PR OFFICE/OUTPT VISIT, EST, LEVL II, 10-19 MIN: ICD-10-PCS | Mod: S$GLB,,, | Performed by: INTERNAL MEDICINE

## 2023-11-10 PROCEDURE — 3075F SYST BP GE 130 - 139MM HG: CPT | Mod: CPTII,S$GLB,, | Performed by: INTERNAL MEDICINE

## 2023-11-10 PROCEDURE — 3288F FALL RISK ASSESSMENT DOCD: CPT | Mod: CPTII,S$GLB,, | Performed by: INTERNAL MEDICINE

## 2023-11-10 PROCEDURE — 3079F PR MOST RECENT DIASTOLIC BLOOD PRESSURE 80-89 MM HG: ICD-10-PCS | Mod: CPTII,S$GLB,, | Performed by: INTERNAL MEDICINE

## 2023-11-10 NOTE — PROGRESS NOTES
Patient ID:  Eboni Reeves is a 68 y.o. female who presents for follow-up of Hyperlipidemia and Results (labs)      She has been doing well denies any chest pains any shortness of breath.  She was started on a new medication for her asthma that has helped her        Past Medical History:   Diagnosis Date    Asthma     Cystitis, interstitial     DVT (deep venous thrombosis)     Encounter for blood transfusion     Fx     RIGHT ANKLE    Hyperlipidemia     LVH (left ventricular hypertrophy)     Renal disorder     STONE    Stroke     AGE 20    TMJ (dislocation of temporomandibular joint)     Urinary tract infection         Past Surgical History:   Procedure Laterality Date    APPENDECTOMY      BLADDER SURGERY      CYSTOURETEROSCOPY WITH RETROGRADE PYELOGRAPHY AND INSERTION OF STENT INTO URETER Right 5/5/2021    Procedure: CYSTOURETEROSCOPY, WITH RETROGRADE PYELOGRAM AND URETERAL STENT INSERTION;  Surgeon: Kelsey Winkler MD;  Location: [facility] OR;  Service: Urology;  Laterality: Right;    HYSTERECTOMY      partial hysterectomy    LASER LITHOTRIPSY Right 5/5/2021    Procedure: LITHOTRIPSY, USING LASER;  Surgeon: Kelsey Winkler MD;  Location: [facility] OR;  Service: Urology;  Laterality: Right;    MANDIBLE FRACTURE SURGERY      TONSILLECTOMY      WRIST SURGERY      right wrist, had plate put in          Current Outpatient Medications   Medication Instructions    albuterol (ACCUNEB) 1.25 mg, Nebulization, Every 6 hours PRN, Rescue    albuterol (PROVENTIL/VENTOLIN HFA) 90 mcg/actuation inhaler 2 puffs, Inhalation, Every 4 hours PRN, Rescue     benralizumab (FASENRA PEN) 30 mg/mL AtIn 1 mL, Subcutaneous, Every 28 days    benralizumab (FASENRA PEN) 30 mg/mL AtIn 1 mL, Subcutaneous, Every 8 weeks    benzonatate (TESSALON) 100 mg, Oral, 3 times daily PRN    cephALEXin (KEFLEX) 500 mg, Oral, Every 8 hours    clotrimazole-betamethasone 1-0.05% (LOTRISONE) cream Topical (Top), 2 times daily    fluticasone  "furoate-vilanteroL (BREO ELLIPTA) 200-25 mcg/dose DsDv diskus inhaler 1 puff, Inhalation, Daily, Controller    fluticasone propionate (FLONASE) 50 mcg, Each Nostril, Daily    lansoprazole (PREVACID) 30 mg, Oral    meclizine (ANTIVERT) 25 mg, Oral, 3 times daily PRN    montelukast (SINGULAIR) 10 mg, Oral, Nightly    mupirocin (BACTROBAN) 2 % ointment Topical (Top), 3 times daily    MYRBETRIQ 50 mg, Oral, Daily    nitrofurantoin, macrocrystal-monohydrate, (MACROBID) 100 MG capsule 100 mg, Oral, 2 times daily    nystatin (MYCOSTATIN) 100,000 unit/mL suspension Oral    PLENVU 140-9-5.2 gram PPkS Take as directed    predniSONE (DELTASONE) 20 MG tablet Take one pill per day for three days as needed for shortness of breath, wheezing, cough. May repeat as needed.    predniSONE (DELTASONE) 20 MG tablet Take three pills per day for three days. Two pills per day for three days. One pill per day for three days.    progesterone (PROMETRIUM) 200 MG capsule TAKE 1 CAPSULE BY MOUTH ONCE IN THE MORNING    rosuvastatin (CRESTOR) 5 mg, Oral, Daily    rosuvastatin (CRESTOR) 5 mg, Oral, Daily    spironolactone (ALDACTONE) 100 MG tablet No dose, route, or frequency recorded.    thyroid (pork) (ARMOUR THYROID) 30 mg, Per G Tube, Daily        Review of patient's allergies indicates:  No Known Allergies     Review of Systems   Cardiovascular:  Negative for chest pain, dyspnea on exertion and leg swelling.   Respiratory:  Negative for cough and shortness of breath.         Objective:     Vitals:    11/10/23 1351   BP: 130/80   BP Location: Left arm   Patient Position: Sitting   BP Method: Medium (Manual)   Pulse: 80   SpO2: 96%   Weight: 77.7 kg (171 lb 4.8 oz)   Height: 5' 7" (1.702 m)       Physical Exam  Vitals and nursing note reviewed.   Constitutional:       Appearance: She is well-developed.   HENT:      Head: Normocephalic and atraumatic.   Eyes:      Conjunctiva/sclera: Conjunctivae normal.   Cardiovascular:      Rate and Rhythm: " Normal rate and regular rhythm.      Heart sounds: Murmur (Grade 2/6 systolic murmur at the base) heard.   Pulmonary:      Effort: Pulmonary effort is normal.      Breath sounds: Normal breath sounds.   Abdominal:      General: Bowel sounds are normal.      Palpations: Abdomen is soft.   Musculoskeletal:         General: Normal range of motion.   Skin:     General: Skin is warm and dry.   Neurological:      Mental Status: She is alert and oriented to person, place, and time.   Psychiatric:         Behavior: Behavior normal.         Thought Content: Thought content normal.         Judgment: Judgment normal.       CMP  Sodium   Date Value Ref Range Status   11/02/2023 137 136 - 145 mmol/L Final   01/11/2017 140 134 - 144 mmol/L      Potassium   Date Value Ref Range Status   11/02/2023 4.6 3.5 - 5.1 mmol/L Final     Chloride   Date Value Ref Range Status   11/02/2023 105 95 - 110 mmol/L Final   01/11/2017 104 98 - 110 mmol/L      CO2   Date Value Ref Range Status   11/02/2023 28 23 - 29 mmol/L Final     Glucose   Date Value Ref Range Status   11/02/2023 99 70 - 110 mg/dL Final   01/11/2017 87 70 - 99 mg/dL      BUN   Date Value Ref Range Status   11/02/2023 22 8 - 23 mg/dL Final     Creatinine   Date Value Ref Range Status   11/02/2023 1.0 0.5 - 1.4 mg/dL Final   01/11/2017 0.76 0.60 - 1.40 mg/dL      Calcium   Date Value Ref Range Status   11/02/2023 10.8 (H) 8.7 - 10.5 mg/dL Final     Total Protein   Date Value Ref Range Status   11/02/2023 7.2 6.0 - 8.4 g/dL Final     Albumin   Date Value Ref Range Status   11/02/2023 4.4 3.5 - 5.2 g/dL Final   01/11/2017 4.4 3.1 - 4.7 g/dL      Total Bilirubin   Date Value Ref Range Status   11/02/2023 0.6 0.1 - 1.0 mg/dL Final     Comment:     For infants and newborns, interpretation of results should be based  on gestational age, weight and in agreement with clinical  observations.    Premature Infant recommended reference ranges:  Up to 24 hours.............<8.0 mg/dL  Up to 48  "hours............<12.0 mg/dL  3-5 days..................<15.0 mg/dL  6-29 days.................<15.0 mg/dL       Alkaline Phosphatase   Date Value Ref Range Status   11/02/2023 63 55 - 135 U/L Final     AST   Date Value Ref Range Status   11/02/2023 19 10 - 40 U/L Final     ALT   Date Value Ref Range Status   11/02/2023 21 10 - 44 U/L Final     Anion Gap   Date Value Ref Range Status   11/02/2023 4 (L) 8 - 16 mmol/L Final     eGFR if    Date Value Ref Range Status   09/30/2021 >60.0 >60 mL/min/1.73 m^2 Final     eGFR if non    Date Value Ref Range Status   09/30/2021 >60.0 >60 mL/min/1.73 m^2 Final     Comment:     Calculation used to obtain the estimated glomerular filtration  rate (eGFR) is the CKD-EPI equation.         BMP  Lab Results   Component Value Date     11/02/2023    K 4.6 11/02/2023     11/02/2023    CO2 28 11/02/2023    BUN 22 11/02/2023    CREATININE 1.0 11/02/2023    CALCIUM 10.8 (H) 11/02/2023    ANIONGAP 4 (L) 11/02/2023    ESTGFRAFRICA >60.0 09/30/2021    EGFRNONAA >60.0 09/30/2021      BNP  @LABRCNTIP(BNP,BNPTRIAGEBLO)@   Lab Results   Component Value Date    CHOL 202 (H) 11/02/2023    CHOL 159 04/28/2023    CHOL 216 (H) 09/30/2021     Lab Results   Component Value Date    HDL 82 (H) 11/02/2023    HDL 79 (H) 04/28/2023    HDL 81 (H) 09/30/2021     Lab Results   Component Value Date    LDLCALC 104.0 11/02/2023    LDLCALC 61.4 (L) 04/28/2023    LDLCALC 120.2 09/30/2021     Lab Results   Component Value Date    TRIG 80 11/02/2023    TRIG 93 04/28/2023    TRIG 74 09/30/2021     Lab Results   Component Value Date    CHOLHDL 40.6 11/02/2023    CHOLHDL 49.7 04/28/2023    CHOLHDL 37.5 09/30/2021      Lab Results   Component Value Date    TSH 2.244 11/02/2023     No results found for: "LABA1C", "HGBA1C"  Lab Results   Component Value Date    WBC 5.22 11/02/2023    HGB 14.7 11/02/2023    HCT 45.6 11/02/2023    MCV 91 11/02/2023     11/02/2023         No " results found for this or any previous visit.     No results found for this or any previous visit.         Assessment:       Hypercholesterolemia  On rosuvastatin.  The results of the lab work was discussed with her.    Elevated coronary artery calcium score  Denies any chest pains.  She is on cholesterol therapy    Mild persistent asthma without complication  Followed by Pulmonary       Plan:       Continue the current medical therapy return to the office in 6 months with a lipid CMP and a CBC.

## 2023-11-13 ENCOUNTER — HOSPITAL ENCOUNTER (OUTPATIENT)
Dept: RADIOLOGY | Facility: HOSPITAL | Age: 68
Discharge: HOME | End: 2023-11-13
Attending: NURSE PRACTITIONER
Payer: MEDICARE

## 2023-11-13 DIAGNOSIS — R10.9 ABDOMINAL PAIN, UNSPECIFIED ABDOMINAL LOCATION: ICD-10-CM

## 2023-11-13 PROCEDURE — 74176 CT RENAL STONE STUDY ABD PELVIS WO: ICD-10-PCS | Mod: 26,,, | Performed by: RADIOLOGY

## 2023-11-13 PROCEDURE — 74176 CT ABD & PELVIS W/O CONTRAST: CPT | Mod: 26,,, | Performed by: RADIOLOGY

## 2023-11-13 PROCEDURE — 74176 CT ABD & PELVIS W/O CONTRAST: CPT | Mod: TC

## 2023-11-13 NOTE — TELEPHONE ENCOUNTER
Urology Telephone Note:    Please call pt and advise I have placed a urine culture order and a CT Renal Stone STudy imaging STAT orders to be done today for her at [facility]  Therefore she can go to hospital today to complete these tests.    And should symptoms worsen, then would need to be evaluated through ER for them to contact Urologist on call at this time.

## 2023-11-21 ENCOUNTER — OFFICE VISIT (OUTPATIENT)
Dept: FAMILY MEDICINE | Facility: CLINIC | Age: 68
End: 2023-11-21
Payer: MEDICARE

## 2023-11-21 VITALS
WEIGHT: 169.38 LBS | SYSTOLIC BLOOD PRESSURE: 120 MMHG | HEIGHT: 67 IN | BODY MASS INDEX: 26.58 KG/M2 | HEART RATE: 65 BPM | DIASTOLIC BLOOD PRESSURE: 70 MMHG | OXYGEN SATURATION: 96 %

## 2023-11-21 DIAGNOSIS — R10.31 RLQ ABDOMINAL PAIN: Primary | ICD-10-CM

## 2023-11-21 DIAGNOSIS — K59.00 CONSTIPATION, UNSPECIFIED CONSTIPATION TYPE: ICD-10-CM

## 2023-11-21 PROCEDURE — 1101F PT FALLS ASSESS-DOCD LE1/YR: CPT | Mod: CPTII,S$GLB,, | Performed by: NURSE PRACTITIONER

## 2023-11-21 PROCEDURE — 3074F PR MOST RECENT SYSTOLIC BLOOD PRESSURE < 130 MM HG: ICD-10-PCS | Mod: CPTII,S$GLB,, | Performed by: NURSE PRACTITIONER

## 2023-11-21 PROCEDURE — 3008F BODY MASS INDEX DOCD: CPT | Mod: CPTII,S$GLB,, | Performed by: NURSE PRACTITIONER

## 2023-11-21 PROCEDURE — 1159F MED LIST DOCD IN RCRD: CPT | Mod: CPTII,S$GLB,, | Performed by: NURSE PRACTITIONER

## 2023-11-21 PROCEDURE — 1101F PR PT FALLS ASSESS DOC 0-1 FALLS W/OUT INJ PAST YR: ICD-10-PCS | Mod: CPTII,S$GLB,, | Performed by: NURSE PRACTITIONER

## 2023-11-21 PROCEDURE — 99213 OFFICE O/P EST LOW 20 MIN: CPT | Mod: S$GLB,,, | Performed by: NURSE PRACTITIONER

## 2023-11-21 PROCEDURE — 3288F FALL RISK ASSESSMENT DOCD: CPT | Mod: CPTII,S$GLB,, | Performed by: NURSE PRACTITIONER

## 2023-11-21 PROCEDURE — 3078F PR MOST RECENT DIASTOLIC BLOOD PRESSURE < 80 MM HG: ICD-10-PCS | Mod: CPTII,S$GLB,, | Performed by: NURSE PRACTITIONER

## 2023-11-21 PROCEDURE — 99213 PR OFFICE/OUTPT VISIT, EST, LEVL III, 20-29 MIN: ICD-10-PCS | Mod: S$GLB,,, | Performed by: NURSE PRACTITIONER

## 2023-11-21 PROCEDURE — 1160F RVW MEDS BY RX/DR IN RCRD: CPT | Mod: CPTII,S$GLB,, | Performed by: NURSE PRACTITIONER

## 2023-11-21 PROCEDURE — 3074F SYST BP LT 130 MM HG: CPT | Mod: CPTII,S$GLB,, | Performed by: NURSE PRACTITIONER

## 2023-11-21 PROCEDURE — 3288F PR FALLS RISK ASSESSMENT DOCUMENTED: ICD-10-PCS | Mod: CPTII,S$GLB,, | Performed by: NURSE PRACTITIONER

## 2023-11-21 PROCEDURE — 1159F PR MEDICATION LIST DOCUMENTED IN MEDICAL RECORD: ICD-10-PCS | Mod: CPTII,S$GLB,, | Performed by: NURSE PRACTITIONER

## 2023-11-21 PROCEDURE — 3078F DIAST BP <80 MM HG: CPT | Mod: CPTII,S$GLB,, | Performed by: NURSE PRACTITIONER

## 2023-11-21 PROCEDURE — 3008F PR BODY MASS INDEX (BMI) DOCUMENTED: ICD-10-PCS | Mod: CPTII,S$GLB,, | Performed by: NURSE PRACTITIONER

## 2023-11-21 PROCEDURE — 1160F PR REVIEW ALL MEDS BY PRESCRIBER/CLIN PHARMACIST DOCUMENTED: ICD-10-PCS | Mod: CPTII,S$GLB,, | Performed by: NURSE PRACTITIONER

## 2023-11-21 NOTE — PROGRESS NOTES
Patient ID: Eboni Reeves is a 68 y.o. female.    Chief Complaint: Abdominal Pain (No bottles//Pt c/o right side lower abd pain x 2 month, pt stated that she is having some mild constipation// denies falls or injuries//Decline flu vaccine//ALEX )    Pt here for sick visit- patient reports past month and a half or so she has been having pain to the right lower quadrant.  She reports she called her urologist and was seen a few weeks ago and diagnosed with UTI, treated with Macrobid though the right lower quadrant pain did not improve.  Urine culture grew out 10-49 K E coli.  Patient reports had 2nd urine test given ongoing symptoms and urine culture this time was negative.  Due to history of kidney stone she was sent for a renal stone study on 11/13 which showed 3 mm nonobstructive stone in the right kidney. Appendix not seen with certainty but no abnormal appendix inflammatory changes appendiceal region is seen, diverticulosis without acute diverticulitis.  Patient reports the pain is present nearly every day.  It is not severe though enough to be bothersome and it seems to be worse when she is up moving around a lot.  She is taken Tylenol or Advil a few times which does seem to help.  She denies any recent dysuria or hematuria.  She does admit over the past month or so she is been a little more constipated than her norm.  She has been having daily bowel movements but they are described as small hard balls of stool.  No fever/chills, n/v  Patient is status post hysterectomy and believes 1 of her ovaries were removed at that time though not sure which side.  She denies any vaginal bleeding or discharge  Last C scope 2018 with Dr. Escobar, patient believes she is due for follow-up though per our records it states repeat 10 years            Past Medical History:   Diagnosis Date    Asthma     Cystitis, interstitial     DVT (deep venous thrombosis)     Encounter for blood transfusion     Fx     RIGHT ANKLE     Hyperlipidemia     LVH (left ventricular hypertrophy)     Renal disorder     STONE    Stroke     AGE 20    TMJ (dislocation of temporomandibular joint)     Urinary tract infection      Past Surgical History:   Procedure Laterality Date    APPENDECTOMY      BLADDER SURGERY      CYSTOURETEROSCOPY WITH RETROGRADE PYELOGRAPHY AND INSERTION OF STENT INTO URETER Right 05/05/2021    Procedure: CYSTOURETEROSCOPY, WITH RETROGRADE PYELOGRAM AND URETERAL STENT INSERTION;  Surgeon: Kelsey Winkler MD;  Location: [facility] OR;  Service: Urology;  Laterality: Right;    EYE SURGERY      FRACTURE SURGERY      HYSTERECTOMY      partial hysterectomy    LASER LITHOTRIPSY Right 05/05/2021    Procedure: LITHOTRIPSY, USING LASER;  Surgeon: Kelsey Winkler MD;  Location: [facility] OR;  Service: Urology;  Laterality: Right;    MANDIBLE FRACTURE SURGERY      TONSILLECTOMY      WRIST SURGERY      right wrist, had plate put in       All of your core healthy metrics are met.        Tobacco History:  reports that she has never smoked. She has been exposed to tobacco smoke. She has never used smokeless tobacco.      Review of patient's allergies indicates:  No Known Allergies    Current Outpatient Medications:     albuterol (ACCUNEB) 1.25 mg/3 mL Nebu, Take 3 mLs (1.25 mg total) by nebulization every 6 (six) hours as needed (Shortness of breath, wheezing, cough). Rescue, Disp: 75 mL, Rfl: 11    albuterol (PROVENTIL/VENTOLIN HFA) 90 mcg/actuation inhaler, Inhale 2 puffs into the lungs every 4 (four) hours as needed for Wheezing or Shortness of Breath. Rescue, Disp: 18 g, Rfl: 11    benralizumab (FASENRA PEN) 30 mg/mL AtIn, Inject 1 mL into the skin every 28 days., Disp: 1 mL, Rfl: 1    benralizumab (FASENRA PEN) 30 mg/mL AtIn, Inject 1 mL into the skin every 8 weeks., Disp: 1 mL, Rfl: 5    benzonatate (TESSALON) 100 MG capsule, Take 1 capsule (100 mg total) by mouth 3 (three) times daily as needed for Cough., Disp: 30 capsule, Rfl: 2     cephALEXin (KEFLEX) 500 MG capsule, Take 1 capsule (500 mg total) by mouth every 8 (eight) hours., Disp: 24 capsule, Rfl: 0    clotrimazole-betamethasone 1-0.05% (LOTRISONE) cream, Apply topically 2 (two) times daily., Disp: 45 g, Rfl: 1    fluticasone furoate-vilanteroL (BREO ELLIPTA) 200-25 mcg/dose DsDv diskus inhaler, Inhale 1 puff into the lungs once daily. Controller, Disp: 180 each, Rfl: 3    fluticasone propionate (FLONASE) 50 mcg/actuation nasal spray, 1 spray (50 mcg total) by Each Nostril route once daily., Disp: 16 g, Rfl: 11    lansoprazole (PREVACID) 30 MG capsule, Take 30 mg by mouth., Disp: , Rfl:     meclizine (ANTIVERT) 25 mg tablet, Take 1 tablet (25 mg total) by mouth 3 (three) times daily as needed for Dizziness., Disp: 30 tablet, Rfl: 0    montelukast (SINGULAIR) 10 mg tablet, Take 1 tablet (10 mg total) by mouth every evening., Disp: 30 tablet, Rfl: 11    mupirocin (BACTROBAN) 2 % ointment, Apply topically 3 (three) times daily., Disp: 22 g, Rfl: 0    MYRBETRIQ 50 mg Tb24, Take 1 tablet (50 mg total) by mouth once daily., Disp: 90 tablet, Rfl: 3    nystatin (MYCOSTATIN) 100,000 unit/mL suspension, Take by mouth., Disp: , Rfl:     PLENVU 140-9-5.2 gram PPkS, Take as directed, Disp: , Rfl:     predniSONE (DELTASONE) 20 MG tablet, Take one pill per day for three days as needed for shortness of breath, wheezing, cough. May repeat as needed., Disp: 21 tablet, Rfl: 0    predniSONE (DELTASONE) 20 MG tablet, Take three pills per day for three days. Two pills per day for three days. One pill per day for three days., Disp: 18 tablet, Rfl: 0    progesterone (PROMETRIUM) 200 MG capsule, TAKE 1 CAPSULE BY MOUTH ONCE IN THE MORNING, Disp: , Rfl:     rosuvastatin (CRESTOR) 5 MG tablet, TAKE 1 TABLET (5 MG TOTAL) BY MOUTH ONCE DAILY., Disp: 90 tablet, Rfl: 0    rosuvastatin (CRESTOR) 5 MG tablet, Take 1 tablet (5 mg total) by mouth once daily., Disp: 90 tablet, Rfl: 0    spironolactone (ALDACTONE) 100 MG  "tablet, , Disp: , Rfl:     thyroid, pork, (ARMOUR THYROID) 30 mg Tab, 30 mg by Per G Tube route once daily., Disp: , Rfl:     Review of Systems   Constitutional:  Negative for appetite change, chills, fever and unexpected weight change.   HENT:  Negative for sore throat and trouble swallowing.    Respiratory:  Negative for cough and shortness of breath.    Cardiovascular:  Negative for chest pain and palpitations.   Gastrointestinal:  Positive for abdominal pain and constipation. Negative for abdominal distention, anal bleeding, blood in stool, diarrhea, nausea and vomiting.   Genitourinary:  Positive for frequency. Negative for dysuria, flank pain, pelvic pain, urgency, vaginal bleeding and vaginal discharge.   Skin:  Negative for rash.   Neurological:  Negative for dizziness and headaches.          Objective:      Vitals:    11/21/23 1528   BP: 120/70   Pulse: 65   SpO2: 96%   Weight: 76.8 kg (169 lb 6.4 oz)   Height: 5' 7" (1.702 m)     Physical Exam  Constitutional:       General: She is not in acute distress.     Appearance: Normal appearance.   HENT:      Head: Normocephalic and atraumatic.      Mouth/Throat:      Mouth: Mucous membranes are moist.   Cardiovascular:      Rate and Rhythm: Normal rate and regular rhythm.      Heart sounds: No murmur heard.  Pulmonary:      Effort: Pulmonary effort is normal. No respiratory distress.      Breath sounds: Normal breath sounds.   Abdominal:      General: Bowel sounds are normal. There is no distension.      Palpations: Abdomen is soft.      Tenderness: There is abdominal tenderness (mild tenderness to deep palpation RLQ). There is no right CVA tenderness, left CVA tenderness, guarding or rebound.   Musculoskeletal:      Cervical back: Neck supple.      Right lower leg: No edema.      Left lower leg: No edema.   Lymphadenopathy:      Cervical: No cervical adenopathy.   Skin:     General: Skin is warm and dry.      Findings: No rash.   Neurological:      General: No " focal deficit present.      Mental Status: She is alert and oriented to person, place, and time.           Assessment:       1. RLQ abdominal pain    2. Constipation, unspecified constipation type           Plan:       RLQ abdominal pain  -patient advised will send for KUB to rule out significant constipation as well as pelvic ultrasound to r/o ovarian source, low suspicion for acute appendicitis.  If pain persists or worsens or these tests are unrevealing then would recommend colonoscopy and/or CT scan with oral contrast  -     US Pelvis Complete Non OB; Future; Expected date: 11/21/2023  -     X-Ray KUB; Future; Expected date: 11/21/2023    Constipation, unspecified constipation type   -recommend starting MiraLax daily     Follow up in about 2 weeks (around 12/5/2023), or if symptoms worsen or fail to improve.        11/22/2023 Damari Guerra, NP

## 2023-11-29 ENCOUNTER — PATIENT MESSAGE (OUTPATIENT)
Dept: ADMINISTRATIVE | Facility: OTHER | Age: 68
End: 2023-11-29
Payer: MEDICARE

## 2023-11-29 ENCOUNTER — PATIENT MESSAGE (OUTPATIENT)
Dept: FAMILY MEDICINE | Facility: CLINIC | Age: 68
End: 2023-11-29

## 2023-11-29 DIAGNOSIS — J45.50 SEVERE PERSISTENT ASTHMA WITHOUT COMPLICATION: ICD-10-CM

## 2023-11-29 RX ORDER — BENRALIZUMAB 30 MG/ML
1 INJECTION, SOLUTION SUBCUTANEOUS
Qty: 1 ML | Refills: 1 | Status: CANCELLED | OUTPATIENT
Start: 2023-11-29

## 2023-11-29 NOTE — TELEPHONE ENCOUNTER
Patient was due to follow up in two weeks from 11/21/23 appointment per Nurse Practitioner Mari's note. Patient states she is still experiencing abdominal discomfort. Patient did inform clinic she is having her x-ray and ultrasound this Friday.  Patient is unable to make her appointment on 12/6/23 due to taking care of her mother. Patient has been successfully rescheduled.

## 2023-12-01 ENCOUNTER — HOSPITAL ENCOUNTER (OUTPATIENT)
Dept: RADIOLOGY | Facility: HOSPITAL | Age: 68
Discharge: HOME | End: 2023-12-01
Attending: NURSE PRACTITIONER
Payer: MEDICARE

## 2023-12-01 DIAGNOSIS — R10.31 RLQ ABDOMINAL PAIN: ICD-10-CM

## 2023-12-01 PROCEDURE — 76830 TRANSVAGINAL US NON-OB: CPT | Mod: TC

## 2023-12-01 PROCEDURE — 76856 US EXAM PELVIC COMPLETE: CPT | Mod: 26,,, | Performed by: RADIOLOGY

## 2023-12-01 PROCEDURE — 76856 US PELVIS COMP WITH TRANSVAG NON-OB (XPD): ICD-10-PCS | Mod: 26,,, | Performed by: RADIOLOGY

## 2023-12-01 PROCEDURE — 74018 XR KUB: ICD-10-PCS | Mod: 26,,, | Performed by: RADIOLOGY

## 2023-12-01 PROCEDURE — 76830 TRANSVAGINAL US NON-OB: CPT | Mod: 26,,, | Performed by: RADIOLOGY

## 2023-12-01 PROCEDURE — 74018 RADEX ABDOMEN 1 VIEW: CPT | Mod: TC

## 2023-12-01 PROCEDURE — 74018 RADEX ABDOMEN 1 VIEW: CPT | Mod: 26,,, | Performed by: RADIOLOGY

## 2023-12-01 PROCEDURE — 76830 US PELVIS COMP WITH TRANSVAG NON-OB (XPD): ICD-10-PCS | Mod: 26,,, | Performed by: RADIOLOGY

## 2023-12-04 ENCOUNTER — TELEPHONE (OUTPATIENT)
Dept: FAMILY MEDICINE | Facility: CLINIC | Age: 68
End: 2023-12-04

## 2023-12-04 NOTE — TELEPHONE ENCOUNTER
Spoke with patient and verbalized understanding about Nurse Practitioner Mari's results. Patient thanked clinic for the call and had no further questions. Patient will call clinic back if anything further is needed.

## 2023-12-04 NOTE — TELEPHONE ENCOUNTER
----- Message from Damari Guerra NP sent at 12/3/2023  5:15 PM CST -----  Please call patient and let her know x-ray shows she does have large amount of stool present in the bowel consistent with constipation, no bowel obstruction.  Pelvic ultrasound is also negative so her pain is most likely due to constipation.  If she hasn't had a good bowel movement over the weekend I would recommend drinking half a bottle of Mag citrate and then continue with MiraLax daily.

## 2023-12-14 ENCOUNTER — TELEPHONE (OUTPATIENT)
Dept: PULMONOLOGY | Facility: CLINIC | Age: 68
End: 2023-12-14
Payer: MEDICARE

## 2024-01-04 ENCOUNTER — TELEPHONE (OUTPATIENT)
Dept: FAMILY MEDICINE | Facility: CLINIC | Age: 69
End: 2024-01-04
Payer: MEDICARE

## 2024-01-04 RX ORDER — MUPIROCIN 20 MG/G
OINTMENT TOPICAL 3 TIMES DAILY
Qty: 22 G | Refills: 1 | Status: SHIPPED | OUTPATIENT
Start: 2024-01-04

## 2024-01-04 NOTE — TELEPHONE ENCOUNTER
I can send in some mupirocin ointment for nostril and recommend using saline nasal spray to nostrils several times a day. Avoid picking or forcifully blowing nose for several days

## 2024-01-04 NOTE — TELEPHONE ENCOUNTER
===View-only below this line===      ----- Message -----       From:Eboni Reeves       Sent:1/4/2024  1:43 PM CST         To:Patient Appointment Schedule Request Message List    Subject:Appointment Request    I will try some antibiotic ointment inside my nose to see if it helps       ----- Message -----       From:Eboni Reeves       Sent:1/4/2024  1:42 PM CST         To:Patient Appointment Schedule Request Message List    Subject:Appointment Request    It is scabbing up inside the nostril then when I blow my nose or sneeze they come out and the bleeding starts its weird       ----- Message -----       From:Nurse Carr       Sent:1/3/2024  7:56 AM CST         To:Eboni Reeves    Subject:Appointment Request    Good morning!  How long has this been going on? Is it constant or intermittent?      ----- Message -----       From:Eboni Reeves       Sent:1/2/2024  5:50 PM CST         To:Damari Guerra NP    Subject:Appointment Request    Appointment Request From: Eboni Reeves    With Provider: Damari Guerra NP [[facility] [address] [facility]]    Preferred Date Range: 1/3/2024 - 1/5/2024    Preferred Times: Wednesday Afternoon, Thursday Afternoon, Friday Morning, Friday Afternoon    Reason for visit: Nasal issues    Comments:  Bloody nose

## 2024-01-11 DIAGNOSIS — R92.8 ABNORMAL MAMMOGRAM: ICD-10-CM

## 2024-01-11 DIAGNOSIS — Z12.31 SCREENING MAMMOGRAM, ENCOUNTER FOR: Primary | ICD-10-CM

## 2024-02-06 ENCOUNTER — HOSPITAL ENCOUNTER (EMERGENCY)
Facility: HOSPITAL | Age: 69
Discharge: HOME | End: 2024-02-06
Attending: EMERGENCY MEDICINE
Payer: MEDICARE

## 2024-02-06 VITALS
RESPIRATION RATE: 18 BRPM | HEART RATE: 76 BPM | BODY MASS INDEX: 25.84 KG/M2 | OXYGEN SATURATION: 99 % | SYSTOLIC BLOOD PRESSURE: 185 MMHG | DIASTOLIC BLOOD PRESSURE: 86 MMHG | TEMPERATURE: 98 F | WEIGHT: 165 LBS

## 2024-02-06 DIAGNOSIS — N20.1 URETEROLITHIASIS: Primary | ICD-10-CM

## 2024-02-06 LAB
ALBUMIN SERPL BCP-MCNC: 3.8 G/DL (ref 3.5–5.2)
ALP SERPL-CCNC: 56 U/L (ref 55–135)
ALT SERPL W/O P-5'-P-CCNC: 19 U/L (ref 10–44)
ANION GAP SERPL CALC-SCNC: 7 MMOL/L (ref 8–16)
AST SERPL-CCNC: 17 U/L (ref 10–40)
BASOPHILS # BLD AUTO: 0.01 K/UL (ref 0–0.2)
BASOPHILS NFR BLD: 0.1 % (ref 0–1.9)
BILIRUB SERPL-MCNC: 0.5 MG/DL (ref 0.1–1)
BILIRUB UR QL STRIP: NEGATIVE
BUN SERPL-MCNC: 22 MG/DL (ref 8–23)
CALCIUM SERPL-MCNC: 10.3 MG/DL (ref 8.7–10.5)
CHLORIDE SERPL-SCNC: 109 MMOL/L (ref 95–110)
CLARITY UR: ABNORMAL
CO2 SERPL-SCNC: 23 MMOL/L (ref 23–29)
COLOR UR: YELLOW
CREAT SERPL-MCNC: 1.1 MG/DL (ref 0.5–1.4)
DIFFERENTIAL METHOD BLD: ABNORMAL
EOSINOPHIL # BLD AUTO: 0 K/UL (ref 0–0.5)
EOSINOPHIL NFR BLD: 0 % (ref 0–8)
ERYTHROCYTE [DISTWIDTH] IN BLOOD BY AUTOMATED COUNT: 13.1 % (ref 11.5–14.5)
EST. GFR  (NO RACE VARIABLE): 55 ML/MIN/1.73 M^2
GLUCOSE SERPL-MCNC: 93 MG/DL (ref 70–110)
GLUCOSE UR QL STRIP: NEGATIVE
HCT VFR BLD AUTO: 42 % (ref 37–48.5)
HGB BLD-MCNC: 13.5 G/DL (ref 12–16)
HGB UR QL STRIP: ABNORMAL
IMM GRANULOCYTES # BLD AUTO: 0.04 K/UL (ref 0–0.04)
IMM GRANULOCYTES NFR BLD AUTO: 0.4 % (ref 0–0.5)
KETONES UR QL STRIP: NEGATIVE
LEUKOCYTE ESTERASE UR QL STRIP: NEGATIVE
LYMPHOCYTES # BLD AUTO: 1.4 K/UL (ref 1–4.8)
LYMPHOCYTES NFR BLD: 12.5 % (ref 18–48)
MCH RBC QN AUTO: 29.2 PG (ref 27–31)
MCHC RBC AUTO-ENTMCNC: 32.1 G/DL (ref 32–36)
MCV RBC AUTO: 91 FL (ref 82–98)
MICROSCOPIC COMMENT: ABNORMAL
MONOCYTES # BLD AUTO: 0.9 K/UL (ref 0.3–1)
MONOCYTES NFR BLD: 7.9 % (ref 4–15)
NEUTROPHILS # BLD AUTO: 8.7 K/UL (ref 1.8–7.7)
NEUTROPHILS NFR BLD: 79.1 % (ref 38–73)
NITRITE UR QL STRIP: NEGATIVE
NRBC BLD-RTO: 0 /100 WBC
PH UR STRIP: 6 [PH] (ref 5–8)
PLATELET # BLD AUTO: 332 K/UL (ref 150–450)
PMV BLD AUTO: 10.1 FL (ref 9.2–12.9)
POTASSIUM SERPL-SCNC: 4.4 MMOL/L (ref 3.5–5.1)
PROT SERPL-MCNC: 6.4 G/DL (ref 6–8.4)
PROT UR QL STRIP: ABNORMAL
RBC # BLD AUTO: 4.62 M/UL (ref 4–5.4)
RBC #/AREA URNS HPF: 58 /HPF (ref 0–4)
SODIUM SERPL-SCNC: 139 MMOL/L (ref 136–145)
SP GR UR STRIP: >1.03 (ref 1–1.03)
SQUAMOUS #/AREA URNS HPF: 3 /HPF
UNIDENT CRYS URNS QL MICRO: 36
URN SPEC COLLECT METH UR: ABNORMAL
UROBILINOGEN UR STRIP-ACNC: NEGATIVE EU/DL
WBC # BLD AUTO: 10.95 K/UL (ref 3.9–12.7)
WBC #/AREA URNS HPF: 3 /HPF (ref 0–5)

## 2024-02-06 PROCEDURE — 63600175 PHARM REV CODE 636 W HCPCS: Mod: JZ,JG | Performed by: EMERGENCY MEDICINE

## 2024-02-06 PROCEDURE — 96361 HYDRATE IV INFUSION ADD-ON: CPT

## 2024-02-06 PROCEDURE — 99285 EMERGENCY DEPT VISIT HI MDM: CPT | Mod: 25

## 2024-02-06 PROCEDURE — 25000003 PHARM REV CODE 250: Performed by: NURSE PRACTITIONER

## 2024-02-06 PROCEDURE — 80053 COMPREHEN METABOLIC PANEL: CPT | Performed by: NURSE PRACTITIONER

## 2024-02-06 PROCEDURE — 81000 URINALYSIS NONAUTO W/SCOPE: CPT | Performed by: NURSE PRACTITIONER

## 2024-02-06 PROCEDURE — 96374 THER/PROPH/DIAG INJ IV PUSH: CPT

## 2024-02-06 PROCEDURE — 96375 TX/PRO/DX INJ NEW DRUG ADDON: CPT

## 2024-02-06 PROCEDURE — 85025 COMPLETE CBC W/AUTO DIFF WBC: CPT | Performed by: NURSE PRACTITIONER

## 2024-02-06 PROCEDURE — 63600175 PHARM REV CODE 636 W HCPCS: Performed by: NURSE PRACTITIONER

## 2024-02-06 PROCEDURE — 36415 COLL VENOUS BLD VENIPUNCTURE: CPT | Performed by: NURSE PRACTITIONER

## 2024-02-06 RX ORDER — MORPHINE SULFATE 4 MG/ML
4 INJECTION, SOLUTION INTRAMUSCULAR; INTRAVENOUS
Status: COMPLETED | OUTPATIENT
Start: 2024-02-06 | End: 2024-02-06

## 2024-02-06 RX ORDER — KETOROLAC TROMETHAMINE 10 MG/1
10 TABLET, FILM COATED ORAL EVERY 6 HOURS PRN
Qty: 15 TABLET | Refills: 0 | Status: SHIPPED | OUTPATIENT
Start: 2024-02-06 | End: 2024-02-08 | Stop reason: SDUPTHER

## 2024-02-06 RX ORDER — HYDROCODONE BITARTRATE AND ACETAMINOPHEN 5; 325 MG/1; MG/1
1 TABLET ORAL EVERY 6 HOURS PRN
Qty: 8 TABLET | Refills: 0 | Status: SHIPPED | OUTPATIENT
Start: 2024-02-06

## 2024-02-06 RX ORDER — ONDANSETRON 4 MG/1
4 TABLET, ORALLY DISINTEGRATING ORAL EVERY 6 HOURS PRN
Qty: 30 TABLET | Refills: 0 | Status: SHIPPED | OUTPATIENT
Start: 2024-02-06

## 2024-02-06 RX ORDER — TAMSULOSIN HYDROCHLORIDE 0.4 MG/1
0.4 CAPSULE ORAL DAILY
Qty: 5 CAPSULE | Refills: 0 | Status: SHIPPED | OUTPATIENT
Start: 2024-02-06 | End: 2024-02-08 | Stop reason: SDUPTHER

## 2024-02-06 RX ORDER — ONDANSETRON HYDROCHLORIDE 2 MG/ML
4 INJECTION, SOLUTION INTRAVENOUS
Status: COMPLETED | OUTPATIENT
Start: 2024-02-06 | End: 2024-02-06

## 2024-02-06 RX ORDER — KETOROLAC TROMETHAMINE 30 MG/ML
15 INJECTION, SOLUTION INTRAMUSCULAR; INTRAVENOUS
Status: COMPLETED | OUTPATIENT
Start: 2024-02-06 | End: 2024-02-06

## 2024-02-06 RX ADMIN — ONDANSETRON 4 MG: 2 INJECTION INTRAMUSCULAR; INTRAVENOUS at 11:02

## 2024-02-06 RX ADMIN — SODIUM CHLORIDE 1000 ML: 0.9 INJECTION, SOLUTION INTRAVENOUS at 11:02

## 2024-02-06 RX ADMIN — MORPHINE SULFATE 4 MG: 4 INJECTION, SOLUTION INTRAMUSCULAR; INTRAVENOUS at 11:02

## 2024-02-06 RX ADMIN — KETOROLAC TROMETHAMINE 15 MG: 30 INJECTION INTRAMUSCULAR; INTRAVENOUS at 11:02

## 2024-02-06 NOTE — ED PROVIDER NOTES
Encounter Date: 2/6/2024       History     Chief Complaint   Patient presents with    Flank Pain     Left flank pain starting today.      68-year-old female history of interstitial cystitis, DVT, LVH, kidney stone presents with left flank pain sudden onset today at 9:30 a.m. consistent with prior kidney stone.  Patient had developed some frequency and hesitancy yesterday.  Decreased urination today.  Left-sided flank pain and nausea today.    The history is provided by the patient and a relative.     Review of patient's allergies indicates:  No Known Allergies  Past Medical History:   Diagnosis Date    Asthma     Cystitis, interstitial     DVT (deep venous thrombosis)     Encounter for blood transfusion     Fx     RIGHT ANKLE    Hyperlipidemia     LVH (left ventricular hypertrophy)     Renal disorder     STONE    Stroke     AGE 20    TMJ (dislocation of temporomandibular joint)     Urinary tract infection      Past Surgical History:   Procedure Laterality Date    APPENDECTOMY      BLADDER SURGERY      CYSTOURETEROSCOPY WITH RETROGRADE PYELOGRAPHY AND INSERTION OF STENT INTO URETER Right 05/05/2021    Procedure: CYSTOURETEROSCOPY, WITH RETROGRADE PYELOGRAM AND URETERAL STENT INSERTION;  Surgeon: Kelsey Winkler MD;  Location: [facility] OR;  Service: Urology;  Laterality: Right;    EYE SURGERY      FRACTURE SURGERY      HYSTERECTOMY      partial hysterectomy    LASER LITHOTRIPSY Right 05/05/2021    Procedure: LITHOTRIPSY, USING LASER;  Surgeon: Kelsey Winkler MD;  Location: [facility] OR;  Service: Urology;  Laterality: Right;    MANDIBLE FRACTURE SURGERY      TONSILLECTOMY      WRIST SURGERY      right wrist, had plate put in     Family History   Problem Relation Age of Onset    Urolithiasis Father     Prostate cancer Paternal Uncle     Breast cancer Maternal Grandmother     Kidney cancer Neg Hx      Social History     Tobacco Use    Smoking status: Never     Passive exposure: Past    Smokeless tobacco: Never    Substance Use Topics    Alcohol use: Not Currently     Alcohol/week: 2.0 standard drinks of alcohol     Types: 2 Glasses of wine per week    Drug use: No     Review of Systems   Gastrointestinal:  Positive for nausea.   Genitourinary:  Positive for decreased urine volume, flank pain and frequency.       Physical Exam     Initial Vitals [02/06/24 1055]   BP Pulse Resp Temp SpO2   (!) 173/81 70 16 97.7 °F (36.5 °C) 100 %      MAP       --         Physical Exam    Nursing note and vitals reviewed.  Constitutional: She appears well-developed and well-nourished. She is not diaphoretic. No distress.   HENT:   Head: Normocephalic and atraumatic.   Eyes: EOM are normal.   Neck: Neck supple.   Normal range of motion.  Cardiovascular:  Normal rate, regular rhythm and normal heart sounds.     Exam reveals no gallop and no friction rub.       No murmur heard.  Pulmonary/Chest: Breath sounds normal. No respiratory distress. She has no wheezes. She has no rhonchi. She has no rales.   Abdominal: She exhibits no distension. There is abdominal tenderness in the left upper quadrant and left lower quadrant.   Some mild diffuse left-sided abdominal tenderness There is no rebound and no guarding.   Musculoskeletal:         General: Normal range of motion.      Cervical back: Normal range of motion and neck supple.     Neurological: She is alert and oriented to person, place, and time.   Skin: Skin is warm and dry.   Psychiatric: She has a normal mood and affect. Her behavior is normal. Judgment and thought content normal.         ED Course   Procedures  Labs Reviewed   CBC W/ AUTO DIFFERENTIAL - Abnormal; Notable for the following components:       Result Value    Gran # (ANC) 8.7 (*)     Gran % 79.1 (*)     Lymph % 12.5 (*)     All other components within normal limits   COMPREHENSIVE METABOLIC PANEL - Abnormal; Notable for the following components:    eGFR 55 (*)     Anion Gap 7 (*)     All other components within normal limits    URINALYSIS, REFLEX TO URINE CULTURE - Abnormal; Notable for the following components:    Appearance, UA Hazy (*)     Specific Gravity, UA >1.030 (*)     Protein, UA Trace (*)     Occult Blood UA 2+ (*)     All other components within normal limits    Narrative:     Specimen Source->Urine   URINALYSIS MICROSCOPIC - Abnormal; Notable for the following components:    RBC, UA 58 (*)     All other components within normal limits    Narrative:     Specimen Source->Urine          Imaging Results              CT Renal Stone Study ABD Pelvis WO (Edited Result - FINAL)  Result time 02/06/24 13:15:41      Addendum (preliminary) 1 of 1 by Tito Bashir MD (02/06/24 13:15:41)      Correction to the impression of the report.  The stone is at the left ureterovesical junction (UVJ).      Electronically signed by: Tito Bashir MD  Date:    02/06/2024  Time:    13:15                 Final result by Tito Bashir MD (02/06/24 12:57:31)                   Impression:      Left UPJ stone resulting in mild hydroureteronephrosis.      Electronically signed by: Tito Bashir MD  Date:    02/06/2024  Time:    12:57               Narrative:    EXAMINATION:  CT RENAL STONE STUDY ABD PELVIS WO    CLINICAL HISTORY:  Flank pain, kidney stone suspected;    TECHNIQUE:  Low dose axial images, sagittal and coronal reformations were obtained from the lung bases to the pubic symphysis.  Contrast was not administered.    COMPARISON:  11/13/2023    FINDINGS:  There is a 3.5 mm stone at the left ureterovesical junction, appearing essentially within the urinary bladder.  There is very mild upstream hydroureteronephrosis.  On the right, there is mild dilatation of the proximal to mid ureter, without obstructing stone or accompanying hydronephrosis, and nonspecific.  No additional urolithiasis is demonstrated.    There is a 1.9 cm cyst of the right hepatic lobe.  The pancreas, spleen, gallbladder, and adrenal glands are  unremarkable.    The bowel is nondilated.  The colon is unremarkable.  There has been hysterectomy.  There is mild calcification of the aorta without aneurysm.  There is a no acute bony abnormality.                                       Medications   sodium chloride 0.9% bolus 1,000 mL 1,000 mL (0 mLs Intravenous Stopped 2/6/24 1246)   ondansetron injection 4 mg (4 mg Intravenous Given 2/6/24 1135)   ketorolac injection 15 mg (15 mg Intravenous Given 2/6/24 1135)   morphine injection 4 mg (4 mg Intravenous Given 2/6/24 1151)     Medical Decision Making  1342 68-year-old with a history of renal colic presents with left flank pain radiating into the groin.  CT demonstrates 3.5 mm stone at the left UVJ.  Her pain is well-controlled at this time she can be discharged home to follow-up with her urologist.  No sign the stone is infected.  Well-appearing on discharge. [EF]      Amount and/or Complexity of Data Reviewed  Labs:  Decision-making details documented in ED Course.  Radiology: ordered. Decision-making details documented in ED Course.  Discussion of management or test interpretation with external provider(s): 1314 Case discussed with Dr. Bashir, stone is likely at the left UVJ [EF]      Risk  Prescription drug management.               ED Course as of 02/06/24 1438   Tue Feb 06, 2024   1127 BP(!): 173/81 [EF]   1127 Temp: 97.7 °F (36.5 °C) [EF]   1127 Temp Source: Oral [EF]   1127 Pulse: 70 [EF]   1127 SpO2: 100 % [EF]   1219 Creatinine: 1.1 [EF]   1231 RBC, UA(!): 58 [EF]   1231 NITRITE UA: Negative [EF]   1231 Leukocyte Esterase, UA: Negative [EF]   1305 CT Renal Stone Study ABD Pelvis WO [EF]   1314 Case discussed with Dr. Bashir, stone is likely at the left UVJ [EF]   1342 68-year-old with a history of renal colic presents with left flank pain radiating into the groin.  CT demonstrates 3.5 mm stone at the left UVJ.  Her pain is well-controlled at this time she can be discharged home to follow-up with her  urologist.  No sign the stone is infected.  Well-appearing on discharge. [EF]      ED Course User Index  [EF] Jaden Ledesma MD                           Clinical Impression:  Final diagnoses:  [N20.1] Ureterolithiasis (Primary)          ED Disposition Condition    Discharge Stable          ED Prescriptions       Medication Sig Dispense Start Date End Date Auth. Provider    tamsulosin (FLOMAX) 0.4 mg Cap Take 1 capsule (0.4 mg total) by mouth once daily. for 5 days 5 capsule 2/6/2024 2/11/2024 Jaden Ledesma MD    HYDROcodone-acetaminophen (NORCO) 5-325 mg per tablet Take 1 tablet by mouth every 6 (six) hours as needed for Pain. 8 tablet 2/6/2024 -- Jaden Ledesma MD    ketorolac (TORADOL) 10 mg tablet Take 1 tablet (10 mg total) by mouth every 6 (six) hours as needed for Pain. 15 tablet 2/6/2024 2/11/2024 Jaden Ledesma MD    ondansetron (ZOFRAN-ODT) 4 MG TbDL Take 1 tablet (4 mg total) by mouth every 6 (six) hours as needed (nausea or vomiting). 30 tablet 2/6/2024 -- Jaden Ledesma MD          Follow-up Information       Follow up With Specialties Details Why Contact Info Additional Information    [facility] -  Emergency Medicine  If symptoms worsen, As needed [address] [place] [address] [address] [place] [address] 1st floor    Kelsey Winkler MD Urology Schedule an appointment as soon as possible for a visit   [address] [place] [address] [address]  [place] [address]  [phone]                Jaden Ledesma MD  02/06/24 4111

## 2024-02-06 NOTE — ED NOTES
Pt is a 68 y.o female presenting in the er with pain to the L flank area that started this morning. Per pt she began having trouble urinating last night and has only been able to get small amounts out. Pt reports feeling a pressure in her pelvic area.

## 2024-02-06 NOTE — ED NOTES
Discharge instructions reviewed with pt. Instructed to follow up with urology and return to the ED for worsening of symptoms.

## 2024-02-06 NOTE — FIRST PROVIDER EVALUATION
Emergency Department TeleTriage Encounter Note      CHIEF COMPLAINT    Chief Complaint   Patient presents with    Flank Pain     Left flank pain starting today.        VITAL SIGNS   Initial Vitals [02/06/24 1055]   BP Pulse Resp Temp SpO2   (!) 173/81 70 16 97.7 °F (36.5 °C) 100 %      MAP       --            ALLERGIES    Review of patient's allergies indicates:  No Known Allergies    PROVIDER TRIAGE NOTE  Verbal consent for the teletriage evaluation was given by the patient at the start of the evaluation.  All efforts will be made to maintain patient's privacy during the evaluation.      This is a teletriage evaluation of a 68 y.o. female presenting to the ED with c/o decreased urine output and left flank pain that started last night.  Also reports nausea.  H/O kidney stones. Limited physical exam via telehealth: The patient is awake, alert, answering questions appropriately and is not in respiratory distress.  As the Teletriage provider, I performed an initial assessment and ordered appropriate labs and imaging studies, if any, to facilitate the patient's care once placed in the ED. Once a room is available, care and a full evaluation will be completed by an alternate ED provider.  Any additional orders and the final disposition will be determined by that provider.  All imaging and labs will not be followed-up by the Teletriage Team, including myself.          ORDERS  Labs Reviewed - No data to display    ED Orders (720h ago, onward)      Start Ordered     Status Ordering Provider    02/06/24 1115 02/06/24 1104  sodium chloride 0.9% bolus 1,000 mL 1,000 mL  ED 1 Time         Ordered TARIK REDDY    02/06/24 1115 02/06/24 1104  ondansetron injection 4 mg  ED 1 Time         Ordered TARIK REDDY    02/06/24 1104 02/06/24 1104  Saline lock IV  Once         Ordered TARIK REDDY    02/06/24 1104 02/06/24 1104  CBC auto differential  STAT         Ordered TARIK REDDY    02/06/24 1104 02/06/24 1104  Comprehensive  metabolic panel  STAT         Ordered TARIK REDDY    02/06/24 1104 02/06/24 1104  Urinalysis, Reflex to Urine Culture Urine, Clean Catch  STAT         Ordered TARIK REDDY              Virtual Visit Note: The provider triage portion of this emergency department evaluation and documentation was performed via [employer], a HIPAA-compliant telemedicine application, in concert with a tele-presenter in the room. A face to face patient evaluation with one of my colleagues will occur once the patient is placed in an emergency department room.      DISCLAIMER: This note was prepared with [employer] voice recognition transcription software. Garbled syntax, mangled pronouns, and other bizarre constructions may be attributed to that software system.

## 2024-02-07 ENCOUNTER — HOSPITAL ENCOUNTER (OUTPATIENT)
Dept: RADIOLOGY | Facility: HOSPITAL | Age: 69
Discharge: HOME | End: 2024-02-07
Attending: OBSTETRICS & GYNECOLOGY
Payer: MEDICARE

## 2024-02-07 DIAGNOSIS — R92.8 ABNORMAL MAMMOGRAM: ICD-10-CM

## 2024-02-07 PROCEDURE — 77065 DX MAMMO INCL CAD UNI: CPT | Mod: TC,PO,LT

## 2024-02-07 PROCEDURE — 77061 BREAST TOMOSYNTHESIS UNI: CPT | Mod: TC,PO,LT

## 2024-02-07 PROCEDURE — 76642 ULTRASOUND BREAST LIMITED: CPT | Mod: TC,PO,LT

## 2024-02-08 ENCOUNTER — OFFICE VISIT (OUTPATIENT)
Dept: UROLOGY | Facility: CLINIC | Age: 69
End: 2024-02-08
Payer: MEDICARE

## 2024-02-08 VITALS — WEIGHT: 165 LBS | BODY MASS INDEX: 25.9 KG/M2 | HEIGHT: 67 IN

## 2024-02-08 DIAGNOSIS — N20.1 URETERAL STONE: Primary | ICD-10-CM

## 2024-02-08 PROCEDURE — 1126F AMNT PAIN NOTED NONE PRSNT: CPT | Mod: HCNC,CPTII,S$GLB, | Performed by: NURSE PRACTITIONER

## 2024-02-08 PROCEDURE — 3288F FALL RISK ASSESSMENT DOCD: CPT | Mod: HCNC,CPTII,S$GLB, | Performed by: NURSE PRACTITIONER

## 2024-02-08 PROCEDURE — 99999 PR PBB SHADOW E&M-EST. PATIENT-LVL V: CPT | Mod: PBBFAC,HCNC,, | Performed by: NURSE PRACTITIONER

## 2024-02-08 PROCEDURE — 99214 OFFICE O/P EST MOD 30 MIN: CPT | Mod: HCNC,S$GLB,, | Performed by: NURSE PRACTITIONER

## 2024-02-08 PROCEDURE — 1159F MED LIST DOCD IN RCRD: CPT | Mod: HCNC,CPTII,S$GLB, | Performed by: NURSE PRACTITIONER

## 2024-02-08 PROCEDURE — 87086 URINE CULTURE/COLONY COUNT: CPT | Mod: HCNC | Performed by: NURSE PRACTITIONER

## 2024-02-08 PROCEDURE — 3008F BODY MASS INDEX DOCD: CPT | Mod: HCNC,CPTII,S$GLB, | Performed by: NURSE PRACTITIONER

## 2024-02-08 PROCEDURE — 1160F RVW MEDS BY RX/DR IN RCRD: CPT | Mod: HCNC,CPTII,S$GLB, | Performed by: NURSE PRACTITIONER

## 2024-02-08 PROCEDURE — 1101F PT FALLS ASSESS-DOCD LE1/YR: CPT | Mod: HCNC,CPTII,S$GLB, | Performed by: NURSE PRACTITIONER

## 2024-02-08 RX ORDER — TAMSULOSIN HYDROCHLORIDE 0.4 MG/1
0.4 CAPSULE ORAL DAILY
Qty: 30 CAPSULE | Refills: 0 | Status: SHIPPED | OUTPATIENT
Start: 2024-02-08 | End: 2024-03-09

## 2024-02-08 RX ORDER — KETOROLAC TROMETHAMINE 10 MG/1
10 TABLET, FILM COATED ORAL EVERY 8 HOURS PRN
Qty: 15 TABLET | Refills: 0 | Status: SHIPPED | OUTPATIENT
Start: 2024-02-08 | End: 2024-02-13

## 2024-02-08 NOTE — PROGRESS NOTES
[facility]  Group: Peterson/Jc/Carrington/Edwin  NPs: Monalisa Woodward/Emily Newton    Note today written by:Emily Newton  Date of Service: 02/08/2024      CHIEF COMPLAINT: ureteral stone.    PRESENTING ILLNESS:    Eboni Reeves is a 68 y.o. female who presents for f/u ureteral stone. Last clinic visit was 11/6/23 with Monalisa Marin NP     Interval history by Peterson in CLINIC on 10/17/23 for f/u nocturia:  She had uti sx in august 2022. Cath ucx was neg. Since I saw her last 2 years ago she has been on myrbetriq.  She only wakes up 1-2 times a night previously was waking up 4-6 times a night.  She urinates about 3 times a day and has no incontinence.  When she has missed a dose she definitely wakes up more times.  She is not had a UTI in over a year.  No Interstitial Cystitis episodes in over a year. No stone episodes  Ua today neg. Pvr: 15  Taking care of her 88 and 88 yo parents.      Urine history:  10/17/23          Neg, pvr: 15  8/1/22              Ng  6/3/22              E.FAECALIS  5/13/21            Mod bld/small wbc  5/12/21            Cath: 2+bld/tr leuk, void: 1+leuk/3+bld/2+prot   5/3/21              Cath: 3+bld, void:      UA 11/6/23 is trace leuks, otherwise normal findings.  Also c/o right lower quadrant abdominal pain.  She has been taking AZO prn pain.  No gross hematuria noted by pt.    Interval history 2/8/24 (Aman MONTALVO)  Pt was seen in ED 2/6/24 for left flank pain, urinary frequency and urgency.  Micro UA RBC 58, UA 2+ blood, neg leuk or nitrite  Creatinine 1.1 / GFR 55  CBC WBC 10.95  CT RSS: left ureteral stone, 3.5 mm, mild hydro  Discharged home with flomax, norco, toradol, and zofran    Last kidney stone occurrence was 2021 with Dr Winkler     Today, pt states left flank/lower abd pain is aching, cramping pain/ tolerable  Taking toradol as needed  She is taking flomax daily  Denies nausea or vomiting, has not needed  "zofran  Denies fever or chills.  Denies difficulty voiding  Denies dysuria or gross hematuria    She is straining with each void but has not collected stone.    REVIEW OF SYSTEMS: as stated above in hpi    PATIENT HISTORY:  Past Medical History:   Diagnosis Date    Asthma     Cystitis, interstitial     DVT (deep venous thrombosis)     Encounter for blood transfusion     Fx     RIGHT ANKLE    Hyperlipidemia     LVH (left ventricular hypertrophy)     Renal disorder     STONE    Stroke     AGE 20    TMJ (dislocation of temporomandibular joint)     Urinary tract infection        Past Surgical History:   Procedure Laterality Date    APPENDECTOMY      BLADDER SURGERY      CYSTOURETEROSCOPY WITH RETROGRADE PYELOGRAPHY AND INSERTION OF STENT INTO URETER Right 05/05/2021    Procedure: CYSTOURETEROSCOPY, WITH RETROGRADE PYELOGRAM AND URETERAL STENT INSERTION;  Surgeon: Kelsey Winkler MD;  Location: [facility] OR;  Service: Urology;  Laterality: Right;    EYE SURGERY      FRACTURE SURGERY      HYSTERECTOMY      partial hysterectomy    LASER LITHOTRIPSY Right 05/05/2021    Procedure: LITHOTRIPSY, USING LASER;  Surgeon: Kelsey Winkler MD;  Location: [facility] OR;  Service: Urology;  Laterality: Right;    MANDIBLE FRACTURE SURGERY      TONSILLECTOMY      WRIST SURGERY      right wrist, had plate put in       Allergies:  Patient has no known allergies.      PHYSICAL EXAMINATION:  Constitutional: She is oriented to person, place, and time. She appears well-developed and well-nourished.  She is in no apparent distress.  Abdominal:  She exhibits no distension.  There is no CVA tenderness.   Neurological: She is alert and oriented to person, place, and time.   Psych: Cooperative with normal affect.    Physical Exam    LABS:      Lab Results   Component Value Date    CREATININE 1.1 02/06/2024     No results found for: "LABA1C", "HGBA1C"      IMPRESSION:    Encounter Diagnoses   Name Primary?    Ureteral stone Yes "       PLAN:  -Urine sent for culture. Will call with results    -Drink 2-3 liters of water daily  Continue flomax daily, refilled to pharmacy on file  Continue toradol as needed, refilled to pharmacy on file  Take zofran as needed for nausea or vomiting    Strain every urine.  Strainer provided.    Patient instructed to bring in stone for stone analysis.  Sterile cup provided.      Will repeat CT RSS if patient has not passed stone in 3 weeks.  Informed patient that CT RSS will be canceled if she passes the stone prior.    Get prompt medical attention if any of the following occur:  Severe pain that returns and not relieved by pain medicines  Repeated vomiting or unable to keep down fluids  Weakness, dizziness or fainting  Fever of 100.4ºF (38ºC) or higher, or as directed by your healthcare provider  Blood clots in urine  Foul smelling or cloudy urine  Unable to pass urine for 8 hours or increasing bladder pressure       -RTC based on results    I encouraged her or any of her family members to call or email me with questions and/or concerns.      30 minutes of total time spent on the encounter, which includes face to face time and non-face to face time preparing to see the patient (eg, review of tests), Obtaining and/or reviewing separately obtained history, Documenting clinical information in the electronic or other health record, Independently interpreting results (not separately reported) and communicating results to the patient/family/caregiver, or Care coordination (not separately reported).

## 2024-02-08 NOTE — PATIENT INSTRUCTIONS
-Drink 2-3 liters of water daily  Continue flomax daily  Continue pain medication as needed    Strain every urine.  Strainer provided.    Patient instructed to bring in stone for stone analysis.  Sterile cup provided.      Will repeat CT RSS if patient has not passed stone in 3 weeks.  Informed patient that CT RSS will be canceled if she passes the stone prior.    Get prompt medical attention if any of the following occur:  Severe pain that returns and not relieved by pain medicines  Repeated vomiting or unable to keep down fluids  Weakness, dizziness or fainting  Fever of 100.4ºF (38ºC) or higher, or as directed by your healthcare provider  Blood clots in urine  Foul smelling or cloudy urine  Unable to pass urine for 8 hours or increasing bladder pressure

## 2024-02-10 LAB — BACTERIA UR CULT: NO GROWTH

## 2024-02-12 ENCOUNTER — TELEPHONE (OUTPATIENT)
Dept: UROLOGY | Facility: CLINIC | Age: 69
End: 2024-02-12
Payer: MEDICARE

## 2024-02-19 ENCOUNTER — HOSPITAL ENCOUNTER (OUTPATIENT)
Dept: PREADMISSION TESTING | Facility: HOSPITAL | Age: 69
Discharge: HOME | End: 2024-02-19
Attending: INTERNAL MEDICINE
Payer: MEDICARE

## 2024-02-19 VITALS
SYSTOLIC BLOOD PRESSURE: 143 MMHG | BODY MASS INDEX: 25.58 KG/M2 | DIASTOLIC BLOOD PRESSURE: 81 MMHG | HEIGHT: 67 IN | HEART RATE: 73 BPM | TEMPERATURE: 99 F | RESPIRATION RATE: 16 BRPM | OXYGEN SATURATION: 100 % | WEIGHT: 163 LBS

## 2024-02-19 DIAGNOSIS — Z01.818 PRE-OP TESTING: Primary | ICD-10-CM

## 2024-02-19 PROCEDURE — 93005 ELECTROCARDIOGRAM TRACING: CPT | Performed by: INTERNAL MEDICINE

## 2024-02-19 PROCEDURE — 93010 ELECTROCARDIOGRAM REPORT: CPT | Mod: ,,, | Performed by: INTERNAL MEDICINE

## 2024-02-19 NOTE — DISCHARGE INSTRUCTIONS
INSTRUCTIONS  To confirm your doctor has scheduled your surgery for:   2/23/24    Morning of surgery please check in with registration near [address]ing [address]age Entrance then proceed to Registration then to endoscopy department    ENDOSCOPY NURSES will call the afternoon prior to procedure with your final arrival time.    TAKE ONLY THESE MEDICATIONS WITH A SMALL SIP OF WATER THE MORNING OF SURGERY:  thyroid meds and inhaler    DO NOT TAKE ANY INSULIN OR ORAL DIABETIC MEDICATIONS THE MORNING OF SURGERY UNLESS DIRECTED BY YOUR PHYSICIAN OR PRE ADMIT NURSE.    DO NOT TAKE THESE MEDICATIONS 5-7 DAYS PRIOR to your procedure per your surgeon's request: ASPIRIN, ALEVE, BC powder, DEMARIO SELTZER, IBUPROFEN, FISH OIL, VITAMIN E, OR HERBALS   (May take Tylenol)    If you are prescribed any types of blood thinners (Aspirin, Coumadin, Plavix, Pradaxa, Xarelto, Aggrenox, Effient, Eliquis, Savasya, Brilinta or any other), please ask your surgeon how many days before scheduled procedure should you stop taking them. You may also need to verify with prescribing physician if it is OK to stop your blood thinners.      INSTRUCTIONS IMPORTANT!!  Do not eat or drink anything between midnight and the time of your procedure- this includes gum, mints, and candy. You may brush your teeth but do not swallow.  ONLY if you are diabetic, check your sugar in the morning before your procedure.  Do not smoke, vape or drink alcoholic beverages 24 hours prior to your procedure.  If you wear contact lenses, dentures, hearing aids or glasses, bring a container to put them in during surgery and give to a family member for safe keeping.    Please leave all jewelry, piercing's and valuables at home.   Wear comfortable loose clothing and rubber soled shoes.  If your condition changes such as fever, cough, etc, please notify your surgeon.   ONLY if you have been diagnosed with sleep apnea please bring your C-PAP machine.  ONLY if you wear home oxygen please bring  your portable oxygen tank the day of your procedure.   ONLY for patients requiring bowel prep, written instructions will be given by your doctor's office.  ONLY if you have a neuro stimulator, please bring the controller with you the morning of surgery  Make arrangements in advance for transportation home by a responsible adult.  You must make arrangements for transportation, TAXI'S, UBER'S OR LYFTS ARE NOT ALLOWED.        If you have any questions about these instructions, call Endoscopy Nurse at [phone]

## 2024-02-22 NOTE — H&P
GASTROENTEROLOGY PRE-PROCEDURE H&P NOTE  Patient Name: Eboni Reeves  Patient MRN: [id number]  Patient : 1955    Service date: 2024    PCP: Domo Gonzalez MD    No chief complaint on file.      HPI: Patient did get beenfit after dilation but has recurrence of dysphagia. she has had recurrent steroid course x 2 since November and her steroid inhaler.      she has constipation and she takes miralax and that relieves that.     reviewed visit   The patient is seen for the evaluation of suspected GERD. Noted the onset of heartburn, regurgitation, dysphagia, frequent need to clear the throat, chronic cough and hoarseness 6 months ago. Symptoms have been occurring 1 time(s) per times per day. During a given day, they are most prevalent shortly after eating meals, in the middle of the night and upon awakening in the morning. They are exacerbated by eating spicy foods, eating fatty/greasy foods and recent weight gain. In the past, the patient has tried Prevacid OTC. Currently takes Prevacid OTC dosed every day. On this therapy, symptom response has been variable. Associated symptoms include dysphagia. Has also noted atypical (non-esophageal) symptoms including shortness of breath. .     Past Medical History  EGD 2022 with small hiatal hernia, Schatzki's ring, Candida, linear gastric ulcers  Colonoscopy:  2018 diverticulosis coli 5 polyps removed internal hemorrhoids  Past Medical History:   Diagnosis Date    Asthma     Cystitis, interstitial     DVT (deep venous thrombosis)     Encounter for blood transfusion     Fx     RIGHT ANKLE    Hyperlipidemia     LVH (left ventricular hypertrophy)     Renal disorder     STONE    Stroke     AGE 20    TMJ (dislocation of temporomandibular joint)     Urinary tract infection         Past Surgical History:  Past Surgical History:   Procedure Laterality Date    APPENDECTOMY      BLADDER SURGERY      kidney stone removal    CYSTOURETEROSCOPY WITH  RETROGRADE PYELOGRAPHY AND INSERTION OF STENT INTO URETER Right 05/05/2021    Procedure: CYSTOURETEROSCOPY, WITH RETROGRADE PYELOGRAM AND URETERAL STENT INSERTION;  Surgeon: Kelsey Winkler MD;  Location: [facility] OR;  Service: Urology;  Laterality: Right;    EYE SURGERY  2014    cataract    FRACTURE SURGERY  2005    ankle /wrist    HYSTERECTOMY      partial hysterectomy    LASER LITHOTRIPSY Right 05/05/2021    Procedure: LITHOTRIPSY, USING LASER;  Surgeon: Kelsey Winkler MD;  Location: [facility] OR;  Service: Urology;  Laterality: Right;    MANDIBLE FRACTURE SURGERY      TONSILLECTOMY      WRIST SURGERY      right wrist, had plate put in        Home Medications:  No medications prior to admission.               Review of patient's allergies indicates:  No Known Allergies    Social History:   Social History     Occupational History    Not on file   Tobacco Use    Smoking status: Never     Passive exposure: Past    Smokeless tobacco: Never   Substance and Sexual Activity    Alcohol use: Not Currently     Alcohol/week: 2.0 standard drinks of alcohol     Types: 2 Glasses of wine per week    Drug use: No    Sexual activity: Yes     Partners: Male       Family History:   Family History   Problem Relation Age of Onset    Urolithiasis Father     Prostate cancer Paternal Uncle     Breast cancer Maternal Grandmother     Kidney cancer Neg Hx        Review of Systems:  A 10 point review of systems was performed and was normal, except as mentioned in the HPI, including constitutional, HEENT, heme, lymph, cardiovascular, respiratory, gastrointestinal, genitourinary, neurologic, endocrine, psychiatric and musculoskeletal.      OBJECTIVE:    Physical Exam:  24 Hour Vital Sign Ranges:    Most recent vitals: There were no vitals taken for this visit.   GEN: well-developed, well-nourished, awake and alert, non-toxic appearing adult  HEENT: PERRL, sclera anicteric, oral mucosa pink and moist without lesion  NECK: trachea  "midline; Good ROM  CV: regular rate and rhythm, no murmurs or gallops  RESP: clear to auscultation bilaterally, no wheezes, rhonci or rales  ABD: soft, non-tender, non-distended, normal bowel sounds  EXT: no swelling or edema, 2+ pulses distally  SKIN: no rashes or jaundice  PSYCH: normal affect    Labs:   No results for input(s): "WBC", "MCV", "PLT" in the last 72 hours.    Invalid input(s): "HGBAU"  No results for input(s): "NA", "K", "CL", "CO2", "BUN", "GLU" in the last 72 hours.    Invalid input(s): "CREA"  No results for input(s): "ALB" in the last 72 hours.    Invalid input(s): "ALKP", "SGOT", "SGPT", "TBIL", "DBIL", "TPRO"  No results for input(s): "PT", "INR", "PTT" in the last 72 hours.      IMPRESSION / RECOMMENDATIONS:  Asthma  Diarrhea  Dysphagia (e)  Gastroparesis syndrome  GERD (e)  Idiopathic peripheral neuropathy  Obstructive sleep apnea syndrome  Overactive bladder  Personal history of colon polyps      Assessment:   1. Irritable bowel syndrome -  mixed    benefiber and miralax    K58.9: Irritable bowel syndrome without diarrhea  IRRITABLE BOWEL SYNDROME: CARE INSTRUCTIONS    stable on miralax. add fiber    3/2/23 - aspiration after egd with asthma  recurreenct dysphagia tx with nystatin prn  colon at hospital      Plan: Colonoscopy at hospital with Anesthesia Provider/CRNA who is hereby directed and authorized to administer anesthesia  nystatin 100,000 unit/mL Swallow 5 ml in mouth four times a day for 5 days        with interventions as warranted.   RIsks, benefits, alternatives discussed in detail regarding upcoming procedures and sedation. Some of the more common endoscopic complications include but not limited to immediate or delayed perforation, bleeding, infections, pain, inadvertent injury to surrounding tissue / organs and possible need for surgical evaluation. Patient expressed understanding, all questions answered and will proceed with procedure as planned.     Shagufta MCCRACKEN" Shawn  2/22/2024  3:39 PM

## 2024-02-23 ENCOUNTER — ANESTHESIA EVENT (OUTPATIENT)
Dept: SURGERY | Facility: HOSPITAL | Age: 69
End: 2024-02-23
Payer: MEDICARE

## 2024-02-23 ENCOUNTER — HOSPITAL ENCOUNTER (OUTPATIENT)
Facility: HOSPITAL | Age: 69
Discharge: HOME | End: 2024-02-23
Attending: INTERNAL MEDICINE | Admitting: INTERNAL MEDICINE
Payer: MEDICARE

## 2024-02-23 ENCOUNTER — ANESTHESIA (OUTPATIENT)
Dept: SURGERY | Facility: HOSPITAL | Age: 69
End: 2024-02-23
Payer: MEDICARE

## 2024-02-23 VITALS
OXYGEN SATURATION: 100 % | TEMPERATURE: 98 F | HEART RATE: 64 BPM | DIASTOLIC BLOOD PRESSURE: 67 MMHG | RESPIRATION RATE: 16 BRPM | HEIGHT: 67 IN | SYSTOLIC BLOOD PRESSURE: 144 MMHG | BODY MASS INDEX: 25.11 KG/M2 | WEIGHT: 160 LBS

## 2024-02-23 DIAGNOSIS — Z12.11 SCREENING FOR COLON CANCER: ICD-10-CM

## 2024-02-23 PROCEDURE — 25000003 PHARM REV CODE 250: Performed by: INTERNAL MEDICINE

## 2024-02-23 PROCEDURE — 88305 TISSUE EXAM BY PATHOLOGIST: CPT | Mod: TC,59 | Performed by: PATHOLOGY

## 2024-02-23 PROCEDURE — 25000003 PHARM REV CODE 250: Performed by: NURSE ANESTHETIST, CERTIFIED REGISTERED

## 2024-02-23 PROCEDURE — 37000008 HC ANESTHESIA 1ST 15 MINUTES: Performed by: INTERNAL MEDICINE

## 2024-02-23 PROCEDURE — 63600175 PHARM REV CODE 636 W HCPCS: Performed by: NURSE ANESTHETIST, CERTIFIED REGISTERED

## 2024-02-23 PROCEDURE — D9220A PRA ANESTHESIA: Mod: PT,ANES,, | Performed by: ANESTHESIOLOGY

## 2024-02-23 PROCEDURE — D9220A PRA ANESTHESIA: Mod: PT,CRNA,, | Performed by: NURSE ANESTHETIST, CERTIFIED REGISTERED

## 2024-02-23 PROCEDURE — 27201089 HC SNARE, DISP (ANY): Performed by: INTERNAL MEDICINE

## 2024-02-23 PROCEDURE — 45385 COLONOSCOPY W/LESION REMOVAL: CPT | Performed by: INTERNAL MEDICINE

## 2024-02-23 PROCEDURE — 37000009 HC ANESTHESIA EA ADD 15 MINS: Performed by: INTERNAL MEDICINE

## 2024-02-23 PROCEDURE — 27201114 HC TRAP (ANY): Performed by: INTERNAL MEDICINE

## 2024-02-23 RX ORDER — LIDOCAINE HYDROCHLORIDE 20 MG/ML
INJECTION INTRAVENOUS
Status: DISCONTINUED | OUTPATIENT
Start: 2024-02-23 | End: 2024-02-23

## 2024-02-23 RX ORDER — PROPOFOL 10 MG/ML
VIAL (ML) INTRAVENOUS
Status: DISCONTINUED | OUTPATIENT
Start: 2024-02-23 | End: 2024-02-23

## 2024-02-23 RX ADMIN — PROPOFOL 50 MG: 10 INJECTION, EMULSION INTRAVENOUS at 07:02

## 2024-02-23 RX ADMIN — SODIUM CHLORIDE: 900 INJECTION, SOLUTION INTRAVENOUS at 07:02

## 2024-02-23 RX ADMIN — LIDOCAINE HYDROCHLORIDE 20 MG: 20 INJECTION, SOLUTION INTRAVENOUS at 07:02

## 2024-02-23 NOTE — TRANSFER OF CARE
"Anesthesia Transfer of Care Note    Patient: Eboni Reeves    Procedure(s) Performed: Procedure(s) (LRB):  COLONOSCOPY (N/A)    Patient location: GI    Anesthesia Type: general    Transport from OR: Transported from OR on room air with adequate spontaneous ventilation    Post pain: adequate analgesia    Post assessment: no apparent anesthetic complications    Post vital signs: stable    Level of consciousness: awake and alert    Nausea/Vomiting: no nausea/vomiting    Complications: none    Transfer of care protocol was followed      Last vitals: Visit Vitals  BP (!) 160/93 (BP Location: Left arm)   Pulse 70   Temp 36.7 °C (98.1 °F) (Oral)   Resp 16   Ht 5' 7" (1.702 m)   Wt 72.6 kg (160 lb)   SpO2 97%   Breastfeeding No   BMI 25.06 kg/m²     "

## 2024-02-23 NOTE — PROVATION PATIENT INSTRUCTIONS
Discharge Summary/Instructions after an Endoscopic Procedure  Patient Name: Eboni Reeves  Patient MRN: [id number]  Patient YOB: 1955 Friday, February 23, 2024  Shagufta Escobar MD  RESTRICTIONS:  During your procedure today, you received medications for sedation.  These   medications may affect your judgment, balance and coordination.  Therefore,   for 24 hours, you have the following restrictions:   - DO NOT drive a car, operate machinery, make legal/financial decisions,   sign important papers or drink alcohol.    ACTIVITY:  Today: no heavy lifting, straining or running due to procedural   sedation/anesthesia.  The following day: return to full activity including work.  DIET:  Eat and drink normally unless instructed otherwise.     TREATMENT FOR COMMON SIDE EFFECTS:  - Mild abdominal pain, nausea, belching, bloating or excessive gas:  rest,   eat lightly and use a heating pad.  - Sore Throat: treat with throat lozenges and/or gargle with warm salt   water.  - Because air was used during the procedure, expelling large amounts of air   from your rectum or belching is normal.  - If a bowel prep was taken, you may not have a bowel movement for 1-3 days.    This is normal.  SYMPTOMS TO WATCH FOR AND REPORT TO YOUR PHYSICIAN:  1. Abdominal pain or bloating, other than gas cramps.  2. Chest pain.  3. Back pain.  4. Signs of infection such as: chills or fever occurring within 24 hours   after the procedure.  5. Rectal bleeding, which would show as bright red, maroon, or black stools.   (A tablespoon of blood from the rectum is not serious, especially if   hemorrhoids are present.)  6. Vomiting.  7. Weakness or dizziness.  GO DIRECTLY TO THE NEAREST EMERGENCY ROOM IF YOU HAVE ANY OF THE FOLLOWING:      Difficulty breathing              Chills and/or fever over 101 F   Persistent vomiting and/or vomiting blood   Severe abdominal pain   Severe chest pain   Black, tarry stools   Bleeding- more than one  tablespoon   Any other symptom or condition that you feel may need urgent attention  Your doctor recommends these additional instructions:  If any biopsies were taken, your doctors clinic will contact you in 1 to 2   weeks with any results.  - Await pathology results.   - Repeat colonoscopy in 3 years for surveillance.   - Discharge patient to home (with escort).  For questions, problems or results please call your physician - Shagufta Escobar MD at Work:  [phone].  [facility], EMERGENCY ROOM PHONE NUMBER: [phone]  IF A COMPLICATION OR EMERGENCY SITUATION ARISES AND YOU ARE UNABLE TO REACH   YOUR PHYSICIAN - GO DIRECTLY TO THE EMERGENCY ROOM.  Shagufta Escobar MD  2/23/2024 8:02:18 AM  This report has been verified and signed electronically.  Dear patient,  As a result of recent federal legislation (The Federal Cures Act), you may   receive lab or pathology results from your procedure in your [facility]   account before your physician is able to contact you. Your physician or   their representative will relay the results to you with their   recommendations at their soonest availability.  Thank you,  PROVATION

## 2024-02-23 NOTE — ANESTHESIA PREPROCEDURE EVALUATION
02/23/2024  Eboni Reeves is a 68 y.o., female.      Patient Active Problem List   Diagnosis    Overactive bladder    Idiopathic peripheral neuropathy    Mild persistent asthma without complication    Obstructive sleep apnea    Toe pain, right    OM (onychomycosis) - Left Foot    Hypercholesterolemia    Elevated coronary artery calcium score       Past Surgical History:   Procedure Laterality Date    APPENDECTOMY      BLADDER SURGERY  2021    kidney stone removal    CYSTOURETEROSCOPY WITH RETROGRADE PYELOGRAPHY AND INSERTION OF STENT INTO URETER Right 05/05/2021    Procedure: CYSTOURETEROSCOPY, WITH RETROGRADE PYELOGRAM AND URETERAL STENT INSERTION;  Surgeon: Kelsey Winkler MD;  Location: [facility] OR;  Service: Urology;  Laterality: Right;    EYE SURGERY  2014    cataract    FRACTURE SURGERY  2005    ankle /wrist    HYSTERECTOMY      partial hysterectomy    LASER LITHOTRIPSY Right 05/05/2021    Procedure: LITHOTRIPSY, USING LASER;  Surgeon: Kelsey Winkler MD;  Location: [facility] OR;  Service: Urology;  Laterality: Right;    MANDIBLE FRACTURE SURGERY      TONSILLECTOMY      WRIST SURGERY      right wrist, had plate put in        Tobacco Use:  The patient  reports that she has never smoked. She has been exposed to tobacco smoke. She has never used smokeless tobacco.     Results for orders placed or performed during the hospital encounter of 02/19/24   EKG 12-lead    Collection Time: 02/19/24  9:28 AM   Result Value Ref Range    QRS Duration 78 ms    OHS QTC Calculation 410 ms    Narrative    Test Reason : Z01.818,    Vent. Rate : 062 BPM     Atrial Rate : 062 BPM     P-R Int : 158 ms          QRS Dur : 078 ms      QT Int : 404 ms       P-R-T Axes : 053 031 044 degrees     QTc Int : 410 ms    Normal sinus rhythm  Normal ECG  When compared with ECG of 01-APR-2021 13:43,  No significant change was  found    Referred By:             Confirmed By:              Lab Results   Component Value Date    WBC 10.95 02/06/2024    HGB 13.5 02/06/2024    HCT 42.0 02/06/2024    MCV 91 02/06/2024     02/06/2024     BMP  Lab Results   Component Value Date     02/06/2024    K 4.4 02/06/2024     02/06/2024    CO2 23 02/06/2024    BUN 22 02/06/2024    CREATININE 1.1 02/06/2024    CALCIUM 10.3 02/06/2024    ANIONGAP 7 (L) 02/06/2024    GLU 93 02/06/2024    GLU 99 11/02/2023     04/28/2023       No results found for this or any previous visit.         Pre-op Assessment    I have reviewed the Patient Summary Reports.     I have reviewed the Nursing Notes. I have reviewed the NPO Status.   I have reviewed the Medications.     Review of Systems  Anesthesia Hx:  No problems with previous Anesthesia   History of prior surgery of interest to airway management or planning: jaw.         Denies Family Hx of Anesthesia complications.    Denies Personal Hx of Anesthesia complications.                    Social:  No Alcohol Use, Non-Smoker       Hematology/Oncology:  Hematology Normal   Oncology Normal                Hematology Comments: Hx of DVT                     EENT/Dental:  EENT/Dental Normal          Jaw Problems:  Clicking (TMJ) and Stiffness and Jaw problems may cause Airway difficulty   Cardiovascular:        CAD  asymptomatic         hyperlipidemia   ECG has been reviewed.                          Pulmonary:    Asthma mild   Sleep Apnea Albuterol Inhaler use daily    No Home oxygen use     Patient followed by Dr. Park - seen by NP 6 weeks ago           Education provided regarding risk of obstructive sleep apnea            Renal/:  Chronic Renal Disease                Hepatic/GI:  Bowel Prep.                Musculoskeletal:  Musculoskeletal Normal                Neurological:   CVA, residual symptoms Neuromuscular Disease,                                   Endocrine:  Endocrine Normal             Dermatological:  Skin Normal    Psych:  Psychiatric Normal                    Physical Exam  General: Well nourished, Cooperative, Alert and Oriented    Airway:  Mallampati: III   Mouth Opening: Small, but > 3cm  TM Distance: > 6 cm  Tongue: Normal  Neck ROM: Normal ROM    Dental:  Intact, Caps / Implants    Chest/Lungs:  Clear to auscultation, Normal Respiratory Rate    Heart:  Rate: Normal  Rhythm: Regular Rhythm        Anesthesia Plan  Type of Anesthesia, risks & benefits discussed:    Anesthesia Type: Gen Natural Airway  Intra-op Monitoring Plan: Standard ASA Monitors  Post Op Pain Control Plan:   (medical reason for not using multimodal pain management)  Induction:  IV  ASA Score: 3  Anesthesia Plan Notes:       GNA  POM  Propofol     Ready For Surgery From Anesthesia Perspective.     .

## 2024-02-23 NOTE — ANESTHESIA POSTPROCEDURE EVALUATION
Anesthesia Post Evaluation    Patient: Eboni Reeves    Procedure(s) Performed: Procedure(s) (LRB):  COLONOSCOPY (N/A)    Final Anesthesia Type: general      Patient location during evaluation: GI PACU  Patient participation: Yes- Able to Participate  Level of consciousness: awake and alert, oriented and awake  Post-procedure vital signs: reviewed and stable  Pain management: adequate  Airway patency: patent    PONV status at discharge: No PONV  Anesthetic complications: no      Cardiovascular status: blood pressure returned to baseline, hemodynamically stable and stable  Respiratory status: unassisted, spontaneous ventilation and room air  Hydration status: euvolemic  Follow-up not needed.              Vitals Value Taken Time   /58 02/23/24 0755   Temp 36.7 °C (98.1 °F) 02/23/24 0755   Pulse 78 02/23/24 0757   Resp 16 02/23/24 0755   SpO2 99 % 02/23/24 0757   Vitals shown include unvalidated device data.      No case tracking events are documented in the log.      Pain/Flora Score: No data recorded

## 2024-02-29 LAB
OHS QRS DURATION: 78 MS
OHS QTC CALCULATION: 410 MS

## 2024-03-01 ENCOUNTER — HOSPITAL ENCOUNTER (OUTPATIENT)
Dept: RADIOLOGY | Facility: HOSPITAL | Age: 69
Discharge: HOME | End: 2024-03-01
Attending: NURSE PRACTITIONER
Payer: MEDICARE

## 2024-03-01 DIAGNOSIS — N20.1 URETERAL STONE: ICD-10-CM

## 2024-03-01 PROCEDURE — 74176 CT ABD & PELVIS W/O CONTRAST: CPT | Mod: 26,,, | Performed by: RADIOLOGY

## 2024-03-01 PROCEDURE — 74176 CT ABD & PELVIS W/O CONTRAST: CPT | Mod: TC

## 2024-03-04 ENCOUNTER — TELEPHONE (OUTPATIENT)
Dept: FAMILY MEDICINE | Facility: CLINIC | Age: 69
End: 2024-03-04
Payer: MEDICARE

## 2024-03-04 NOTE — TELEPHONE ENCOUNTER
----- Message from Kelsey Flores sent at 3/4/2024  3:57 PM CST -----  Pt needs to have a surgery clearance for knee surgery with dr. Wan. Surgery is scheduled for march 12th     [phone]

## 2024-03-06 ENCOUNTER — OFFICE VISIT (OUTPATIENT)
Dept: FAMILY MEDICINE | Facility: CLINIC | Age: 69
End: 2024-03-06
Payer: MEDICARE

## 2024-03-06 VITALS
SYSTOLIC BLOOD PRESSURE: 134 MMHG | WEIGHT: 168 LBS | HEART RATE: 76 BPM | HEIGHT: 67 IN | BODY MASS INDEX: 26.37 KG/M2 | DIASTOLIC BLOOD PRESSURE: 82 MMHG

## 2024-03-06 DIAGNOSIS — E78.5 DYSLIPIDEMIA: ICD-10-CM

## 2024-03-06 DIAGNOSIS — Z78.0 MENOPAUSE: ICD-10-CM

## 2024-03-06 DIAGNOSIS — Z01.818 PREOPERATIVE CLEARANCE: Primary | ICD-10-CM

## 2024-03-06 DIAGNOSIS — Z13.820 SCREENING FOR OSTEOPOROSIS: ICD-10-CM

## 2024-03-06 DIAGNOSIS — J45.40 MODERATE PERSISTENT ASTHMA WITHOUT COMPLICATION: ICD-10-CM

## 2024-03-06 PROCEDURE — 1160F RVW MEDS BY RX/DR IN RCRD: CPT | Mod: CPTII,S$GLB,, | Performed by: NURSE PRACTITIONER

## 2024-03-06 PROCEDURE — 99214 OFFICE O/P EST MOD 30 MIN: CPT | Mod: S$GLB,,, | Performed by: NURSE PRACTITIONER

## 2024-03-06 PROCEDURE — 1101F PT FALLS ASSESS-DOCD LE1/YR: CPT | Mod: CPTII,S$GLB,, | Performed by: NURSE PRACTITIONER

## 2024-03-06 PROCEDURE — 3079F DIAST BP 80-89 MM HG: CPT | Mod: CPTII,S$GLB,, | Performed by: NURSE PRACTITIONER

## 2024-03-06 PROCEDURE — 3075F SYST BP GE 130 - 139MM HG: CPT | Mod: CPTII,S$GLB,, | Performed by: NURSE PRACTITIONER

## 2024-03-06 PROCEDURE — 3008F BODY MASS INDEX DOCD: CPT | Mod: CPTII,S$GLB,, | Performed by: NURSE PRACTITIONER

## 2024-03-06 PROCEDURE — 3288F FALL RISK ASSESSMENT DOCD: CPT | Mod: CPTII,S$GLB,, | Performed by: NURSE PRACTITIONER

## 2024-03-06 PROCEDURE — 1159F MED LIST DOCD IN RCRD: CPT | Mod: CPTII,S$GLB,, | Performed by: NURSE PRACTITIONER

## 2024-03-06 RX ORDER — INFLUENZA A VIRUS A/VICTORIA/4897/2022 IVR-238 (H1N1) ANTIGEN (FORMALDEHYDE INACTIVATED), INFLUENZA A VIRUS A/DARWIN/6/2021 IVR-227 (H3N2) ANTIGEN (FORMALDEHYDE INACTIVATED), INFLUENZA B VIRUS B/AUSTRIA/1359417/2021 BVR-26 ANTIGEN (FORMALDEHYDE INACTIVATED), INFLUENZA B VIRUS B/PHUKET/3073/2013 BVR-1B ANTIGEN (FORMALDEHYDE INACTIVATED) 15; 15; 15; 15 UG/.5ML; UG/.5ML; UG/.5ML; UG/.5ML
INJECTION, SUSPENSION INTRAMUSCULAR
COMMUNITY
Start: 2023-11-29

## 2024-03-06 NOTE — LETTER
[address] [facility] [address]  [place] [address]  Phone: [phone]   [phone]                        MD Shay Gimenez MD Chequita Williams, MD Matthew Bassett, PA-C Linda Melerine, NP Jodi Powell, NP Jennifer Shields, NP      Date: 03/06/2024        Patient: Eboni Reeves  YOB: 1955      Dear ,       I saw your patient today for a surgical clearance consult.  It is my medical opinion based on her exam today that she is medically stable and cleared for knee surgery.     If you have any questions or concerns, please don't hesitate to contact my office.       Sincerely,      Damari Guerra, KATIA

## 2024-03-06 NOTE — PROGRESS NOTES
Chief Complaint: Pre-op Exam (Left knee surgery scheduled with Dr. Wan // did not bring bottles, states she only wants us to fill Spironolactone by Dr. Guzmán // Dexa - Due, states she was not able to get on the table last time but she is hopeful she will be able to after her surgery ac )      Pt here for preop clearance for left meniscus repair scheduled on 3/12/2024 with Dr. Wan. Plans for general anesthesia.     Denies any previous issues with anesthesia. Denies any issues recently with chest pain, SOB or palpitations. Had EKG last month as preop for cscope.    Reports asthma well controlled on current meds.  No recent issues with kidney stones        Past Medical History:   Diagnosis Date    Asthma     Cystitis, interstitial     DVT (deep venous thrombosis)     Encounter for blood transfusion     Fx     RIGHT ANKLE    Hyperlipidemia     LVH (left ventricular hypertrophy)     Renal disorder     STONE    Stroke     AGE 20    TMJ (dislocation of temporomandibular joint)     Urinary tract infection      Past Surgical History:   Procedure Laterality Date    APPENDECTOMY      BLADDER SURGERY  2021    kidney stone removal    COLONOSCOPY N/A 2/23/2024    Procedure: COLONOSCOPY;  Surgeon: Shagufta Escobar MD;  Location: [facility];  Service: Endoscopy;  Laterality: N/A;    CYSTOURETEROSCOPY WITH RETROGRADE PYELOGRAPHY AND INSERTION OF STENT INTO URETER Right 05/05/2021    Procedure: CYSTOURETEROSCOPY, WITH RETROGRADE PYELOGRAM AND URETERAL STENT INSERTION;  Surgeon: Kelsey Winkler MD;  Location: Replaced by [facility];  Service: Urology;  Laterality: Right;    EYE SURGERY  2014    cataract    FRACTURE SURGERY  2005    ankle /wrist    HYSTERECTOMY      partial hysterectomy    LASER LITHOTRIPSY Right 05/05/2021    Procedure: LITHOTRIPSY, USING LASER;  Surgeon: Kelsey Winkler MD;  Location: Replaced by [facility];  Service: Urology;  Laterality: Right;    MANDIBLE FRACTURE SURGERY      TONSILLECTOMY      WRIST SURGERY      right  wrist, had plate put in           Alcohol History:  reports that she does not currently use alcohol after a past usage of about 2.0 standard drinks of alcohol per week.  Tobacco History:  reports that she has never smoked. She has been exposed to tobacco smoke. She has never used smokeless tobacco.      Review of patient's allergies indicates:  No Known Allergies    Current Outpatient Medications:     FLUAD QUAD 2023-24,65Y UP,,PF, 60 mcg (15 mcg x 4)/0.5 mL Syrg, , Disp: , Rfl:     albuterol (PROVENTIL/VENTOLIN HFA) 90 mcg/actuation inhaler, Inhale 2 puffs into the lungs every 4 (four) hours as needed for Wheezing or Shortness of Breath. Rescue, Disp: 18 g, Rfl: 11    benralizumab (FASENRA PEN) 30 mg/mL AtIn, Inject 1 mL into the skin every 8 weeks., Disp: 1 mL, Rfl: 5    benzonatate (TESSALON) 100 MG capsule, Take 1 capsule (100 mg total) by mouth 3 (three) times daily as needed for Cough., Disp: 30 capsule, Rfl: 2    clotrimazole-betamethasone 1-0.05% (LOTRISONE) cream, Apply topically 2 (two) times daily., Disp: 45 g, Rfl: 1    fluticasone furoate-vilanteroL (BREO ELLIPTA) 200-25 mcg/dose DsDv diskus inhaler, Inhale 1 puff into the lungs once daily. Controller, Disp: 180 each, Rfl: 3    fluticasone propionate (FLONASE) 50 mcg/actuation nasal spray, 1 spray (50 mcg total) by Each Nostril route once daily., Disp: 16 g, Rfl: 11    HYDROcodone-acetaminophen (NORCO) 5-325 mg per tablet, Take 1 tablet by mouth every 6 (six) hours as needed for Pain., Disp: 8 tablet, Rfl: 0    lansoprazole (PREVACID) 30 MG capsule, Take 30 mg by mouth once daily., Disp: , Rfl:     meclizine (ANTIVERT) 25 mg tablet, Take 1 tablet (25 mg total) by mouth 3 (three) times daily as needed for Dizziness., Disp: 30 tablet, Rfl: 0    montelukast (SINGULAIR) 10 mg tablet, Take 1 tablet (10 mg total) by mouth every evening., Disp: 30 tablet, Rfl: 11    mupirocin (BACTROBAN) 2 % ointment, Apply topically 3 (three) times daily., Disp: 22 g, Rfl:  0    mupirocin (BACTROBAN) 2 % ointment, by Nasal route 3 (three) times daily., Disp: 22 g, Rfl: 1    MYRBETRIQ 50 mg Tb24, Take 1 tablet (50 mg total) by mouth once daily., Disp: 90 tablet, Rfl: 3    ondansetron (ZOFRAN-ODT) 4 MG TbDL, Take 1 tablet (4 mg total) by mouth every 6 (six) hours as needed (nausea or vomiting)., Disp: 30 tablet, Rfl: 0    PLENVU 140-9-5.2 gram PPkS, Take as directed, Disp: , Rfl:     predniSONE (DELTASONE) 20 MG tablet, Take one pill per day for three days as needed for shortness of breath, wheezing, cough. May repeat as needed., Disp: 21 tablet, Rfl: 0    progesterone (PROMETRIUM) 200 MG capsule, TAKE 1 CAPSULE BY MOUTH ONCE IN THE MORNING, Disp: , Rfl:     rosuvastatin (CRESTOR) 5 MG tablet, Take 1 tablet (5 mg total) by mouth once daily., Disp: 90 tablet, Rfl: 0    spironolactone (ALDACTONE) 100 MG tablet, Take 50 mg by mouth once daily., Disp: , Rfl:     tamsulosin (FLOMAX) 0.4 mg Cap, Take 1 capsule (0.4 mg total) by mouth once daily., Disp: 30 capsule, Rfl: 0    thyroid, pork, (ARMOUR THYROID) 30 mg Tab, 30 mg by Per G Tube route once daily., Disp: , Rfl:     Review of Systems   Constitutional:  Negative for appetite change, chills, fever and unexpected weight change.   HENT:  Negative for sore throat and trouble swallowing.    Respiratory:  Negative for cough and shortness of breath.    Cardiovascular:  Negative for chest pain and palpitations.   Gastrointestinal:  Negative for abdominal distention, abdominal pain, anal bleeding, blood in stool, constipation, diarrhea, nausea and vomiting.   Genitourinary:  Negative for dysuria, flank pain, frequency, pelvic pain, urgency, vaginal bleeding and vaginal discharge.   Musculoskeletal:  Positive for arthralgias (left knee pain/swelling).   Skin:  Negative for rash.   Neurological:  Negative for dizziness, syncope and headaches.          Objective:      Vitals:    03/06/24 1139   BP: 134/82   Pulse: 76   Weight: 76.2 kg (168 lb)  "  Height: 5' 7" (1.702 m)     Physical Exam  Constitutional:       General: She is not in acute distress.     Appearance: Normal appearance.   HENT:      Head: Normocephalic and atraumatic.      Right Ear: Tympanic membrane and ear canal normal.      Left Ear: Tympanic membrane and ear canal normal.      Mouth/Throat:      Mouth: Mucous membranes are moist.   Neck:      Vascular: No carotid bruit.   Cardiovascular:      Rate and Rhythm: Normal rate and regular rhythm.      Heart sounds: No murmur heard.  Pulmonary:      Effort: Pulmonary effort is normal. No respiratory distress.      Breath sounds: Normal breath sounds.   Abdominal:      General: Bowel sounds are normal. There is no distension.      Palpations: Abdomen is soft.      Tenderness: There is no abdominal tenderness. There is no right CVA tenderness, left CVA tenderness, guarding or rebound.   Musculoskeletal:      Cervical back: Neck supple.      Left knee: Effusion present. No erythema or ecchymosis. Normal range of motion.      Right lower leg: No edema.      Left lower leg: No edema.   Lymphadenopathy:      Cervical: No cervical adenopathy.   Skin:     General: Skin is warm and dry.      Findings: No rash.   Neurological:      General: No focal deficit present.      Mental Status: She is alert and oriented to person, place, and time.           Assessment:       1. Preoperative clearance    2. Moderate persistent asthma without complication    3. Dyslipidemia    4. Menopause    5. Screening for osteoporosis           Plan:       Preoperative clearance   -reviewed EKG and labs from last month, patient cleared for surgery    Moderate persistent asthma without complication   -stable on current meds    Dyslipidemia   -well controlled on last labs    Menopause  -     DXA Bone Density Axial Skeleton 1 or more sites; Future; Expected date: 03/06/2024    Screening for osteoporosis  -     DXA Bone Density Axial Skeleton 1 or more sites; Future; Expected date: " 03/06/2024      Follow up if symptoms worsen or fail to improve.        3/6/2024 Damari Guerra, NP

## 2024-03-26 DIAGNOSIS — Z00.00 ENCOUNTER FOR MEDICARE ANNUAL WELLNESS EXAM: ICD-10-CM

## 2024-03-27 PROBLEM — E78.2 MIXED HYPERLIPIDEMIA: Status: ACTIVE | Noted: 2021-04-26

## 2024-04-26 PROBLEM — J45.50 SEVERE PERSISTENT ASTHMA WITHOUT COMPLICATION: Status: ACTIVE | Noted: 2024-04-26

## 2024-05-03 DIAGNOSIS — J45.50 SEVERE PERSISTENT ASTHMA WITHOUT COMPLICATION: ICD-10-CM

## 2024-05-03 RX ORDER — BENRALIZUMAB 30 MG/ML
1 INJECTION, SOLUTION SUBCUTANEOUS
Qty: 1 ML | Refills: 5 | Status: ACTIVE | OUTPATIENT
Start: 2024-05-03

## 2024-05-06 ENCOUNTER — OFFICE VISIT (OUTPATIENT)
Dept: PULMONOLOGY | Facility: CLINIC | Age: 69
End: 2024-05-06
Payer: MEDICARE

## 2024-05-06 VITALS
BODY MASS INDEX: 26.08 KG/M2 | WEIGHT: 166.13 LBS | DIASTOLIC BLOOD PRESSURE: 74 MMHG | SYSTOLIC BLOOD PRESSURE: 137 MMHG | HEIGHT: 67 IN | HEART RATE: 64 BPM | OXYGEN SATURATION: 98 %

## 2024-05-06 DIAGNOSIS — J45.50 SEVERE PERSISTENT ASTHMA WITHOUT COMPLICATION: Primary | ICD-10-CM

## 2024-05-06 PROCEDURE — 3008F BODY MASS INDEX DOCD: CPT | Mod: HCNC,CPTII,S$GLB, | Performed by: NURSE PRACTITIONER

## 2024-05-06 PROCEDURE — 99999 PR PBB SHADOW E&M-EST. PATIENT-LVL IV: CPT | Mod: PBBFAC,HCNC,, | Performed by: NURSE PRACTITIONER

## 2024-05-06 PROCEDURE — 1126F AMNT PAIN NOTED NONE PRSNT: CPT | Mod: HCNC,CPTII,S$GLB, | Performed by: NURSE PRACTITIONER

## 2024-05-06 PROCEDURE — 3288F FALL RISK ASSESSMENT DOCD: CPT | Mod: HCNC,CPTII,S$GLB, | Performed by: NURSE PRACTITIONER

## 2024-05-06 PROCEDURE — 1159F MED LIST DOCD IN RCRD: CPT | Mod: HCNC,CPTII,S$GLB, | Performed by: NURSE PRACTITIONER

## 2024-05-06 PROCEDURE — 99213 OFFICE O/P EST LOW 20 MIN: CPT | Mod: HCNC,S$GLB,, | Performed by: NURSE PRACTITIONER

## 2024-05-06 PROCEDURE — 3075F SYST BP GE 130 - 139MM HG: CPT | Mod: HCNC,CPTII,S$GLB, | Performed by: NURSE PRACTITIONER

## 2024-05-06 PROCEDURE — 3078F DIAST BP <80 MM HG: CPT | Mod: HCNC,CPTII,S$GLB, | Performed by: NURSE PRACTITIONER

## 2024-05-06 PROCEDURE — 1101F PT FALLS ASSESS-DOCD LE1/YR: CPT | Mod: HCNC,CPTII,S$GLB, | Performed by: NURSE PRACTITIONER

## 2024-05-06 RX ORDER — FLUTICASONE FUROATE AND VILANTEROL 200; 25 UG/1; UG/1
1 POWDER RESPIRATORY (INHALATION) DAILY
Qty: 180 EACH | Refills: 3 | Status: SHIPPED | OUTPATIENT
Start: 2024-05-06

## 2024-05-06 RX ORDER — ALBUTEROL SULFATE 90 UG/1
2 AEROSOL, METERED RESPIRATORY (INHALATION) EVERY 4 HOURS PRN
Qty: 18 G | Refills: 11 | Status: SHIPPED | OUTPATIENT
Start: 2024-05-06

## 2024-05-06 RX ORDER — HYLAN G-F 20 16MG/2ML
48 SYRINGE (ML) INTRAARTICULAR
COMMUNITY
Start: 2024-04-24

## 2024-05-06 RX ORDER — RSV VACC, PREF A AND PREF B/PF 120MCG/0.5
0.5 VIAL (EA) INTRAMUSCULAR
COMMUNITY
Start: 2023-11-29
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
## 2024-06-10 RX ORDER — ROSUVASTATIN CALCIUM 5 MG/1
5 TABLET, COATED ORAL DAILY
Qty: 90 TABLET | Refills: 0 | Status: SHIPPED | OUTPATIENT
Start: 2024-06-10 | End: 2024-09-08

## 2024-07-09 ENCOUNTER — TELEPHONE (OUTPATIENT)
Dept: FAMILY MEDICINE | Facility: CLINIC | Age: 69
End: 2024-07-09
Payer: MEDICARE

## 2024-07-09 NOTE — TELEPHONE ENCOUNTER
----- Message from Kelsey Flores sent at 7/9/2024  8:28 AM CDT -----  Surgery clearance from dr. Soco peterson

## 2024-07-15 ENCOUNTER — TELEPHONE (OUTPATIENT)
Dept: FAMILY MEDICINE | Facility: CLINIC | Age: 69
End: 2024-07-15

## 2024-07-15 ENCOUNTER — HOSPITAL ENCOUNTER (OUTPATIENT)
Dept: RADIOLOGY | Facility: HOSPITAL | Age: 69
Discharge: HOME | End: 2024-07-15
Payer: MEDICARE

## 2024-07-15 ENCOUNTER — OFFICE VISIT (OUTPATIENT)
Dept: FAMILY MEDICINE | Facility: CLINIC | Age: 69
End: 2024-07-15
Payer: MEDICARE

## 2024-07-15 VITALS
WEIGHT: 168 LBS | HEART RATE: 82 BPM | HEIGHT: 65 IN | DIASTOLIC BLOOD PRESSURE: 68 MMHG | SYSTOLIC BLOOD PRESSURE: 110 MMHG | OXYGEN SATURATION: 96 % | BODY MASS INDEX: 27.99 KG/M2

## 2024-07-15 DIAGNOSIS — J45.50 SEVERE PERSISTENT ASTHMA WITHOUT COMPLICATION: ICD-10-CM

## 2024-07-15 DIAGNOSIS — Z01.818 PREOPERATIVE CLEARANCE: Primary | ICD-10-CM

## 2024-07-15 DIAGNOSIS — G47.33 OBSTRUCTIVE SLEEP APNEA: ICD-10-CM

## 2024-07-15 PROBLEM — I70.0 AORTIC ATHEROSCLEROSIS: Status: ACTIVE | Noted: 2024-07-15

## 2024-07-15 PROBLEM — D33.3 BENIGN NEOPLASM OF CRANIAL NERVES: Status: ACTIVE | Noted: 2024-07-15

## 2024-07-15 LAB
EKG 12-LEAD: NORMAL
PR INTERVAL: 156
PRT AXES: NORMAL
QRS DURATION: 68
QT/QTC: NORMAL
VENTRICULAR RATE: 75

## 2024-07-15 PROCEDURE — 1101F PT FALLS ASSESS-DOCD LE1/YR: CPT | Mod: CPTII,S$GLB,,

## 2024-07-15 PROCEDURE — 3288F FALL RISK ASSESSMENT DOCD: CPT | Mod: CPTII,S$GLB,,

## 2024-07-15 PROCEDURE — 71046 X-RAY EXAM CHEST 2 VIEWS: CPT | Mod: 26,,, | Performed by: RADIOLOGY

## 2024-07-15 PROCEDURE — 3074F SYST BP LT 130 MM HG: CPT | Mod: CPTII,S$GLB,,

## 2024-07-15 PROCEDURE — 99214 OFFICE O/P EST MOD 30 MIN: CPT | Mod: S$GLB,,,

## 2024-07-15 PROCEDURE — 1126F AMNT PAIN NOTED NONE PRSNT: CPT | Mod: CPTII,S$GLB,,

## 2024-07-15 PROCEDURE — 3008F BODY MASS INDEX DOCD: CPT | Mod: CPTII,S$GLB,,

## 2024-07-15 PROCEDURE — 71046 X-RAY EXAM CHEST 2 VIEWS: CPT | Mod: TC,PO

## 2024-07-15 PROCEDURE — 3078F DIAST BP <80 MM HG: CPT | Mod: CPTII,S$GLB,,

## 2024-07-15 PROCEDURE — 1159F MED LIST DOCD IN RCRD: CPT | Mod: CPTII,S$GLB,,

## 2024-07-15 PROCEDURE — 93000 ELECTROCARDIOGRAM COMPLETE: CPT | Mod: S$GLB,,,

## 2024-07-15 NOTE — PROGRESS NOTES
Chief Complaint: Pre-op Exam (No bottles//Pt here for surgery clearance for total left knee replacement//declines dexa at this time//JL)    69 year old established female presents today for preoperative clearance.   She has no acute complaints or concerns today.         Pt here for preop clearance for total knee arthroplasty, left scheduled on TBD with Dr. Wan. Plans for general anesthesia.   Denies any previous issues with anesthesia. Denies any issues recently with chest pain, SOB or palpitations.  Last stress test was approximately 2 years ago with Dr. Ball and was normal.        Past Medical History:   Diagnosis Date    Asthma     Cystitis, interstitial     DVT (deep venous thrombosis)     Encounter for blood transfusion     Fx     RIGHT ANKLE    Hyperlipidemia     LVH (left ventricular hypertrophy)     Renal disorder     STONE    Stroke     AGE 20    TMJ (dislocation of temporomandibular joint)     Urinary tract infection      Past Surgical History:   Procedure Laterality Date    APPENDECTOMY      BLADDER SURGERY  2021    kidney stone removal    COLONOSCOPY N/A 02/23/2024    Procedure: COLONOSCOPY;  Surgeon: Shagufta Escobar MD;  Location: [facility];  Service: Endoscopy;  Laterality: N/A;    CYSTOURETEROSCOPY WITH RETROGRADE PYELOGRAPHY AND INSERTION OF STENT INTO URETER Right 05/05/2021    Procedure: CYSTOURETEROSCOPY, WITH RETROGRADE PYELOGRAM AND URETERAL STENT INSERTION;  Surgeon: Kelsey Winkler MD;  Location: [facility];  Service: Urology;  Laterality: Right;    EYE SURGERY  2014    cataract    FRACTURE SURGERY  2005    ankle /wrist    HYSTERECTOMY      partial hysterectomy    KNEE CARTILAGE SURGERY Left 03/2024    LASER LITHOTRIPSY Right 05/05/2021    Procedure: LITHOTRIPSY, USING LASER;  Surgeon: Kelsey Winkler MD;  Location: [facility];  Service: Urology;  Laterality: Right;    MANDIBLE FRACTURE SURGERY      TONSILLECTOMY      WRIST SURGERY      right wrist, had plate put in            Alcohol History:  reports that she does not currently use alcohol after a past usage of about 2.0 standard drinks of alcohol per week.  Tobacco History:  reports that she has never smoked. She has been exposed to tobacco smoke. She has never used smokeless tobacco.      Review of patient's allergies indicates:  No Known Allergies    Current Outpatient Medications:     albuterol (PROVENTIL/VENTOLIN HFA) 90 mcg/actuation inhaler, Inhale 2 puffs into the lungs every 4 (four) hours as needed for Wheezing or Shortness of Breath. Rescue, Disp: 18 g, Rfl: 11    benralizumab (FASENRA PEN) 30 mg/mL AtIn, Inject 1 mL into the skin every 8 weeks., Disp: 1 mL, Rfl: 5    fluticasone furoate-vilanteroL (BREO ELLIPTA) 200-25 mcg/dose DsDv diskus inhaler, Inhale 1 puff into the lungs once daily. Controller, Disp: 180 each, Rfl: 3    fluticasone propionate (FLONASE) 50 mcg/actuation nasal spray, 1 spray (50 mcg total) by Each Nostril route once daily., Disp: 16 g, Rfl: 11    lansoprazole (PREVACID) 30 MG capsule, Take 30 mg by mouth once daily., Disp: , Rfl:     montelukast (SINGULAIR) 10 mg tablet, Take 1 tablet (10 mg total) by mouth every evening., Disp: 30 tablet, Rfl: 11    MYRBETRIQ 50 mg Tb24, Take 1 tablet (50 mg total) by mouth once daily., Disp: 90 tablet, Rfl: 3    ondansetron (ZOFRAN-ODT) 4 MG TbDL, Take 1 tablet (4 mg total) by mouth every 6 (six) hours as needed (nausea or vomiting)., Disp: 30 tablet, Rfl: 0    predniSONE (DELTASONE) 20 MG tablet, Take one pill per day for three days as needed for shortness of breath, wheezing, cough. May repeat as needed., Disp: 21 tablet, Rfl: 0    progesterone (PROMETRIUM) 200 MG capsule, TAKE 1 CAPSULE BY MOUTH ONCE IN THE MORNING, Disp: , Rfl:     rosuvastatin (CRESTOR) 5 MG tablet, TAKE 1 TABLET (5 MG TOTAL) BY MOUTH ONCE DAILY., Disp: 90 tablet, Rfl: 0    spironolactone (ALDACTONE) 100 MG tablet, Take 50 mg by mouth once daily., Disp: , Rfl:     thyroid, pork, (ARMOUR  "THYROID) 30 mg Tab, 30 mg by Per G Tube route once daily., Disp: , Rfl:     ABRYSVO 120 mcg/0.5 mL SolR vaccine, Inject 0.5 mLs into the muscle., Disp: , Rfl:     benzonatate (TESSALON) 100 MG capsule, Take 1 capsule (100 mg total) by mouth 3 (three) times daily as needed for Cough. (Patient not taking: Reported on 7/15/2024), Disp: 30 capsule, Rfl: 2    clotrimazole-betamethasone 1-0.05% (LOTRISONE) cream, Apply topically 2 (two) times daily. (Patient not taking: Reported on 7/15/2024), Disp: 45 g, Rfl: 1    FLUAD QUAD 2023-24,65Y UP,,PF, 60 mcg (15 mcg x 4)/0.5 mL Syrg, , Disp: , Rfl:     HYDROcodone-acetaminophen (NORCO) 5-325 mg per tablet, Take 1 tablet by mouth every 6 (six) hours as needed for Pain. (Patient not taking: Reported on 7/15/2024), Disp: 8 tablet, Rfl: 0    meclizine (ANTIVERT) 25 mg tablet, Take 1 tablet (25 mg total) by mouth 3 (three) times daily as needed for Dizziness. (Patient not taking: Reported on 7/15/2024), Disp: 30 tablet, Rfl: 0    mupirocin (BACTROBAN) 2 % ointment, Apply topically 3 (three) times daily. (Patient not taking: Reported on 7/15/2024), Disp: 22 g, Rfl: 0    mupirocin (BACTROBAN) 2 % ointment, by Nasal route 3 (three) times daily. (Patient not taking: Reported on 7/15/2024), Disp: 22 g, Rfl: 1    PLENVU 140-9-5.2 gram PPkS, Take as directed (Patient not taking: Reported on 7/15/2024), Disp: , Rfl:     SYNVISC-ONE 48 mg/6 mL Syrg, Inject 48 mg into the articular space., Disp: , Rfl:     tamsulosin (FLOMAX) 0.4 mg Cap, Take 1 capsule (0.4 mg total) by mouth once daily. (Patient not taking: Reported on 7/15/2024), Disp: 30 capsule, Rfl: 0    Review of Systems   Musculoskeletal:  Positive for arthralgias.   All other systems reviewed and are negative.         Objective:      Vitals:    07/15/24 1334   BP: 110/68   Pulse: 82   SpO2: 96%   Weight: 76.2 kg (168 lb)   Height: 5' 4.5" (1.638 m)     Physical Exam  Constitutional:       General: She is not in acute distress.     " Appearance: Normal appearance.   HENT:      Head: Normocephalic and atraumatic.      Right Ear: Tympanic membrane and ear canal normal.      Left Ear: Tympanic membrane and ear canal normal.      Mouth/Throat:      Mouth: Mucous membranes are moist.   Neck:      Vascular: No carotid bruit.   Cardiovascular:      Rate and Rhythm: Normal rate and regular rhythm.      Heart sounds: No murmur heard.  Pulmonary:      Effort: Pulmonary effort is normal. No respiratory distress.      Breath sounds: Normal breath sounds.   Abdominal:      General: Bowel sounds are normal. There is no distension.      Palpations: Abdomen is soft.      Tenderness: There is no abdominal tenderness. There is no right CVA tenderness, left CVA tenderness, guarding or rebound.   Musculoskeletal:      Cervical back: Neck supple.      Left knee: Effusion present. No erythema or ecchymosis. Normal range of motion.      Right lower leg: No edema.      Left lower leg: No edema.   Lymphadenopathy:      Cervical: No cervical adenopathy.   Skin:     General: Skin is warm and dry.      Findings: No rash.   Neurological:      General: No focal deficit present.      Mental Status: She is alert and oriented to person, place, and time.           Assessment:       1. Preoperative clearance    2. Severe persistent asthma without complication    3. Obstructive sleep apnea           Plan:       Preoperative clearance  EKG shows NSR with PVC's. Patient is established with cardiology. Patient is medically cleared to have surgery.  -     POCT EKG 12-LEAD (Manually Resulted by Ordering Provider)    Severe persistent asthma without complication  Chest xray normal. Patient is established with pulmonology and is very well controlled since starting injection of Fasenra  -     X-Ray Chest PA And Lateral; Future; Expected date: 07/15/2024    Obstructive sleep apnea  Notable history for surgery      No follow-ups on file.        7/15/2024 Kelsey Calvillo NP

## 2024-07-17 ENCOUNTER — TELEPHONE (OUTPATIENT)
Dept: FAMILY MEDICINE | Facility: CLINIC | Age: 69
End: 2024-07-17
Payer: MEDICARE

## 2024-07-17 NOTE — TELEPHONE ENCOUNTER
----- Message from Kelsey Flores sent at 7/17/2024 11:27 AM CDT -----  Dr. Wan's office has not received surgery clearance. Can you please refax   [phone]

## 2024-07-17 NOTE — TELEPHONE ENCOUNTER
Spoke with pt and informed her that the documents for her surgery clearance were re-faxed. Called and confirmed fax number. She voiced understanding.

## 2024-09-30 RX ORDER — MIRABEGRON 50 MG/1
1 TABLET, FILM COATED, EXTENDED RELEASE ORAL DAILY
Qty: 90 TABLET | Refills: 0 | Status: SHIPPED | OUTPATIENT
Start: 2024-09-30

## 2024-10-01 RX ORDER — ROSUVASTATIN CALCIUM 5 MG/1
5 TABLET, COATED ORAL DAILY
Qty: 90 TABLET | Refills: 0 | Status: SHIPPED | OUTPATIENT
Start: 2024-10-01 | End: 2024-12-30

## 2024-10-08 DIAGNOSIS — R09.89 CHRONIC SINUS COMPLAINTS: ICD-10-CM

## 2024-10-08 RX ORDER — FLUTICASONE PROPIONATE 50 MCG
1 SPRAY, SUSPENSION (ML) NASAL DAILY
Qty: 16 G | Refills: 11 | Status: SHIPPED | OUTPATIENT
Start: 2024-10-08

## 2024-11-03 ENCOUNTER — PATIENT MESSAGE (OUTPATIENT)
Dept: ADMINISTRATIVE | Facility: OTHER | Age: 69
End: 2024-11-03
Payer: MEDICARE

## 2024-11-13 RX ORDER — MIRABEGRON 50 MG/1
1 TABLET, FILM COATED, EXTENDED RELEASE ORAL DAILY
Qty: 90 TABLET | Refills: 0 | Status: SHIPPED | OUTPATIENT
Start: 2024-11-13

## 2024-11-19 ENCOUNTER — TELEPHONE (OUTPATIENT)
Dept: FAMILY MEDICINE | Facility: CLINIC | Age: 69
End: 2024-11-19
Payer: MEDICARE

## 2024-11-19 DIAGNOSIS — E83.52 HYPERCALCEMIA: Primary | ICD-10-CM

## 2024-11-20 NOTE — TELEPHONE ENCOUNTER
Please call pt and let her know we got a copy of Dr. Guzmán's labs and her calcium was quite elevated at 12.1- did Dr. Guzmán address this with her? If not I would recommend repeat labs to include BMP and intact PTH and if calcium remains high may need to refer her to endocrinology. Clarify if she's taking any calcium supplements and if so recommend stopping

## 2024-11-22 ENCOUNTER — LAB VISIT (OUTPATIENT)
Dept: LAB | Facility: HOSPITAL | Age: 69
End: 2024-11-22
Attending: NURSE PRACTITIONER
Payer: MEDICARE

## 2024-11-22 DIAGNOSIS — E83.52 HYPERCALCEMIA: ICD-10-CM

## 2024-11-22 LAB
ANION GAP SERPL CALC-SCNC: 5 MMOL/L (ref 8–16)
BUN SERPL-MCNC: 23 MG/DL (ref 8–23)
CALCIUM SERPL-MCNC: 10.2 MG/DL (ref 8.7–10.5)
CHLORIDE SERPL-SCNC: 106 MMOL/L (ref 95–110)
CO2 SERPL-SCNC: 28 MMOL/L (ref 23–29)
CREAT SERPL-MCNC: 0.8 MG/DL (ref 0.5–1.4)
EST. GFR  (NO RACE VARIABLE): >60 ML/MIN/1.73 M^2
GLUCOSE SERPL-MCNC: 94 MG/DL (ref 70–110)
POTASSIUM SERPL-SCNC: 4.4 MMOL/L (ref 3.5–5.1)
PTH-INTACT SERPL-MCNC: 63.6 PG/ML (ref 9–77)
SODIUM SERPL-SCNC: 139 MMOL/L (ref 136–145)

## 2024-11-22 PROCEDURE — 83970 ASSAY OF PARATHORMONE: CPT | Performed by: NURSE PRACTITIONER

## 2024-11-22 PROCEDURE — 36415 COLL VENOUS BLD VENIPUNCTURE: CPT | Performed by: NURSE PRACTITIONER

## 2024-11-22 PROCEDURE — 80048 BASIC METABOLIC PNL TOTAL CA: CPT | Performed by: NURSE PRACTITIONER

## 2024-11-25 ENCOUNTER — TELEPHONE (OUTPATIENT)
Dept: FAMILY MEDICINE | Facility: CLINIC | Age: 69
End: 2024-11-25
Payer: MEDICARE

## 2024-11-25 NOTE — TELEPHONE ENCOUNTER
Spoke with pt in regards to recent lab results. Verbalized verbatim per Damari. Pt acknowledged understanding.

## 2024-11-25 NOTE — TELEPHONE ENCOUNTER
----- Message from Damari Guerra NP sent at 11/24/2024  6:49 PM CST -----  Please let pt know calcium level is in normal range on repeat labs and parathyroid hormone level also normal level. No further workup needed at this time

## 2024-12-02 ENCOUNTER — TELEPHONE (OUTPATIENT)
Dept: CARDIOLOGY | Facility: CLINIC | Age: 69
End: 2024-12-02
Payer: MEDICARE

## 2024-12-09 ENCOUNTER — LAB VISIT (OUTPATIENT)
Dept: LAB | Facility: HOSPITAL | Age: 69
End: 2024-12-09
Attending: INTERNAL MEDICINE
Payer: MEDICARE

## 2024-12-09 DIAGNOSIS — E78.00 HYPERCHOLESTEROLEMIA: ICD-10-CM

## 2024-12-09 DIAGNOSIS — I49.9 VENTRICULAR ARRHYTHMIA: ICD-10-CM

## 2024-12-09 LAB
ALBUMIN SERPL BCP-MCNC: 4.1 G/DL (ref 3.5–5.2)
ALP SERPL-CCNC: 65 U/L (ref 55–135)
ALT SERPL W/O P-5'-P-CCNC: 17 U/L (ref 10–44)
ANION GAP SERPL CALC-SCNC: 4 MMOL/L (ref 8–16)
AST SERPL-CCNC: 17 U/L (ref 10–40)
BILIRUB SERPL-MCNC: 0.4 MG/DL (ref 0.1–1)
BUN SERPL-MCNC: 28 MG/DL (ref 8–23)
CALCIUM SERPL-MCNC: 10 MG/DL (ref 8.7–10.5)
CHLORIDE SERPL-SCNC: 106 MMOL/L (ref 95–110)
CO2 SERPL-SCNC: 28 MMOL/L (ref 23–29)
CREAT SERPL-MCNC: 0.9 MG/DL (ref 0.5–1.4)
ERYTHROCYTE [DISTWIDTH] IN BLOOD BY AUTOMATED COUNT: 14.4 % (ref 11.5–14.5)
EST. GFR  (NO RACE VARIABLE): >60 ML/MIN/1.73 M^2
GLUCOSE SERPL-MCNC: 99 MG/DL (ref 70–110)
HCT VFR BLD AUTO: 43.6 % (ref 37–48.5)
HGB BLD-MCNC: 14.1 G/DL (ref 12–16)
MCH RBC QN AUTO: 29.4 PG (ref 27–31)
MCHC RBC AUTO-ENTMCNC: 32.3 G/DL (ref 32–36)
MCV RBC AUTO: 91 FL (ref 82–98)
PLATELET # BLD AUTO: 343 K/UL (ref 150–450)
PMV BLD AUTO: 10.6 FL (ref 9.2–12.9)
POTASSIUM SERPL-SCNC: 4.4 MMOL/L (ref 3.5–5.1)
PROT SERPL-MCNC: 6.6 G/DL (ref 6–8.4)
RBC # BLD AUTO: 4.79 M/UL (ref 4–5.4)
SODIUM SERPL-SCNC: 138 MMOL/L (ref 136–145)
WBC # BLD AUTO: 7 K/UL (ref 3.9–12.7)

## 2024-12-09 PROCEDURE — 85027 COMPLETE CBC AUTOMATED: CPT | Performed by: INTERNAL MEDICINE

## 2024-12-09 PROCEDURE — 36415 COLL VENOUS BLD VENIPUNCTURE: CPT | Performed by: INTERNAL MEDICINE

## 2024-12-09 PROCEDURE — 80053 COMPREHEN METABOLIC PANEL: CPT | Performed by: INTERNAL MEDICINE

## 2024-12-10 ENCOUNTER — HOSPITAL ENCOUNTER (OUTPATIENT)
Dept: RADIOLOGY | Facility: HOSPITAL | Age: 69
Discharge: HOME | End: 2024-12-10
Attending: OBSTETRICS & GYNECOLOGY
Payer: MEDICARE

## 2024-12-10 VITALS — BODY MASS INDEX: 27.99 KG/M2 | HEIGHT: 65 IN | WEIGHT: 168 LBS

## 2024-12-10 DIAGNOSIS — R92.8 ABNORMAL MAMMOGRAM: ICD-10-CM

## 2024-12-10 PROCEDURE — 77062 BREAST TOMOSYNTHESIS BI: CPT | Mod: 26,,, | Performed by: RADIOLOGY

## 2024-12-10 PROCEDURE — 76642 ULTRASOUND BREAST LIMITED: CPT | Mod: 26,LT,, | Performed by: RADIOLOGY

## 2024-12-10 PROCEDURE — 76642 ULTRASOUND BREAST LIMITED: CPT | Mod: TC,PO,LT

## 2024-12-10 PROCEDURE — 77066 DX MAMMO INCL CAD BI: CPT | Mod: TC,PO

## 2024-12-10 PROCEDURE — 77066 DX MAMMO INCL CAD BI: CPT | Mod: 26,,, | Performed by: RADIOLOGY

## 2024-12-13 ENCOUNTER — OFFICE VISIT (OUTPATIENT)
Dept: CARDIOLOGY | Facility: CLINIC | Age: 69
End: 2024-12-13
Payer: MEDICARE

## 2024-12-13 VITALS
WEIGHT: 169.56 LBS | OXYGEN SATURATION: 98 % | HEART RATE: 67 BPM | SYSTOLIC BLOOD PRESSURE: 132 MMHG | BODY MASS INDEX: 28.65 KG/M2 | DIASTOLIC BLOOD PRESSURE: 80 MMHG

## 2024-12-13 DIAGNOSIS — E78.00 HYPERCHOLESTEROLEMIA: Primary | ICD-10-CM

## 2024-12-13 DIAGNOSIS — R93.1 ELEVATED CORONARY ARTERY CALCIUM SCORE: ICD-10-CM

## 2024-12-13 DIAGNOSIS — J45.30 MILD PERSISTENT ASTHMA WITHOUT COMPLICATION: ICD-10-CM

## 2024-12-13 DIAGNOSIS — I49.9 VENTRICULAR ARRHYTHMIA: ICD-10-CM

## 2024-12-13 DIAGNOSIS — E78.2 MIXED HYPERLIPIDEMIA: ICD-10-CM

## 2024-12-13 PROCEDURE — 3288F FALL RISK ASSESSMENT DOCD: CPT | Mod: HCNC,CPTII,S$GLB, | Performed by: INTERNAL MEDICINE

## 2024-12-13 PROCEDURE — 3008F BODY MASS INDEX DOCD: CPT | Mod: HCNC,CPTII,S$GLB, | Performed by: INTERNAL MEDICINE

## 2024-12-13 PROCEDURE — 1126F AMNT PAIN NOTED NONE PRSNT: CPT | Mod: HCNC,CPTII,S$GLB, | Performed by: INTERNAL MEDICINE

## 2024-12-13 PROCEDURE — 99213 OFFICE O/P EST LOW 20 MIN: CPT | Mod: HCNC,S$GLB,, | Performed by: INTERNAL MEDICINE

## 2024-12-13 PROCEDURE — 1159F MED LIST DOCD IN RCRD: CPT | Mod: HCNC,CPTII,S$GLB, | Performed by: INTERNAL MEDICINE

## 2024-12-13 PROCEDURE — 3079F DIAST BP 80-89 MM HG: CPT | Mod: HCNC,CPTII,S$GLB, | Performed by: INTERNAL MEDICINE

## 2024-12-13 PROCEDURE — 1160F RVW MEDS BY RX/DR IN RCRD: CPT | Mod: HCNC,CPTII,S$GLB, | Performed by: INTERNAL MEDICINE

## 2024-12-13 PROCEDURE — 99999 PR PBB SHADOW E&M-EST. PATIENT-LVL IV: CPT | Mod: PBBFAC,HCNC,, | Performed by: INTERNAL MEDICINE

## 2024-12-13 PROCEDURE — 1101F PT FALLS ASSESS-DOCD LE1/YR: CPT | Mod: HCNC,CPTII,S$GLB, | Performed by: INTERNAL MEDICINE

## 2024-12-13 PROCEDURE — 3075F SYST BP GE 130 - 139MM HG: CPT | Mod: HCNC,CPTII,S$GLB, | Performed by: INTERNAL MEDICINE

## 2024-12-13 RX ORDER — LANSOPRAZOLE 30 MG/1
30 CAPSULE, DELAYED RELEASE ORAL DAILY
Qty: 30 CAPSULE | Refills: 11 | Status: SHIPPED | OUTPATIENT
Start: 2024-12-13

## 2024-12-13 RX ORDER — ROSUVASTATIN CALCIUM 5 MG/1
5 TABLET, COATED ORAL DAILY
Qty: 90 TABLET | Refills: 3 | Status: SHIPPED | OUTPATIENT
Start: 2024-12-13 | End: 2025-12-08

## 2024-12-13 NOTE — PROGRESS NOTES
Patient ID:  Eboni Reeves is a 69 y.o. female who presents for follow-up of Hyperlipidemia      Hypercholesterolemia  On rosuvastatin.  The results of the lab work was discussed with her.     Elevated coronary artery calcium score  Denies any chest pains.  She is on cholesterol therapy     Mild persistent asthma without complication  Followed by Pulmonary    She denies any chest pains any shortness of breath.  She has been doing very well from cardiovascular.  Her coronary calcium score was 35        Past Medical History:   Diagnosis Date    Asthma     Cystitis, interstitial     DVT (deep venous thrombosis)     Encounter for blood transfusion     Fx     RIGHT ANKLE    Hyperlipidemia     LVH (left ventricular hypertrophy)     Renal disorder     STONE    Stroke     AGE 20    TMJ (dislocation of temporomandibular joint)     Urinary tract infection         Past Surgical History:   Procedure Laterality Date    APPENDECTOMY      BLADDER SURGERY  2021    kidney stone removal    COLONOSCOPY N/A 02/23/2024    Procedure: COLONOSCOPY;  Surgeon: Shagufta Escobar MD;  Location: [facility];  Service: Endoscopy;  Laterality: N/A;    CYSTOURETEROSCOPY WITH RETROGRADE PYELOGRAPHY AND INSERTION OF STENT INTO URETER Right 05/05/2021    Procedure: CYSTOURETEROSCOPY, WITH RETROGRADE PYELOGRAM AND URETERAL STENT INSERTION;  Surgeon: Kelsey Winkler MD;  [facility];  Service: Urology;  Laterality: Right;    EYE SURGERY  2014    cataract    FRACTURE SURGERY  2005    ankle /wrist    HYSTERECTOMY      partial hysterectomy    KNEE CARTILAGE SURGERY Left 03/2024    LASER LITHOTRIPSY Right 05/05/2021    Procedure: LITHOTRIPSY, USING LASER;  Surgeon: Kelsey Winkler MD;  Location: [facility];  Service: Urology;  Laterality: Right;    MANDIBLE FRACTURE SURGERY      TONSILLECTOMY      TOTAL KNEE ARTHROPLASTY Left 08/01/2024    WRIST SURGERY      right wrist, had plate put in          Current Outpatient Medications    Medication Instructions    ABRYSVO 120 mcg/0.5 mL SolR vaccine 0.5 mLs    albuterol (PROVENTIL/VENTOLIN HFA) 90 mcg/actuation inhaler 2 puffs, Inhalation, Every 4 hours PRN, Rescue     benralizumab (FASENRA PEN) 30 mg/mL AtIn 1 mL, Subcutaneous, Every 8 weeks    FLUAD QUAD 2023-24,65Y UP,,PF, 60 mcg (15 mcg x 4)/0.5 mL Syrg     fluticasone furoate-vilanteroL (BREO ELLIPTA) 200-25 mcg/dose DsDv diskus inhaler 1 puff, Inhalation, Daily, Controller    fluticasone propionate (FLONASE) 50 mcg, Each Nostril, Daily    HYDROcodone-acetaminophen (NORCO) 5-325 mg per tablet 1 tablet, Oral, Every 6 hours PRN    lansoprazole (PREVACID) 30 mg, Oral, Daily    meclizine (ANTIVERT) 25 mg, Oral, 3 times daily PRN    mupirocin (BACTROBAN) 2 % ointment Topical (Top), 3 times daily    mupirocin (BACTROBAN) 2 % ointment Nasal, 3 times daily    MYRBETRIQ 50 mg, Oral, Daily    ondansetron (ZOFRAN-ODT) 4 mg, Oral, Every 6 hours PRN    PLENVU 140-9-5.2 gram PPkS     predniSONE (DELTASONE) 20 MG tablet Take one pill per day for three days as needed for shortness of breath, wheezing, cough. May repeat as needed.    progesterone (PROMETRIUM) 200 MG capsule TAKE 1 CAPSULE BY MOUTH ONCE IN THE MORNING    rosuvastatin (CRESTOR) 5 mg, Oral, Daily    spironolactone (ALDACTONE) 50 mg, Daily    SYNVISC-ONE 48 mg    tamsulosin (FLOMAX) 0.4 mg, Oral, Daily    thyroid (pork) (ARMOUR THYROID) 30 mg, Daily        Review of patient's allergies indicates:  No Known Allergies     Review of Systems   Cardiovascular:  Negative for chest pain, dyspnea on exertion and palpitations.   Respiratory:  Negative for cough and shortness of breath.         Objective:     Vitals:    12/13/24 1109   BP: 132/80   BP Location: Left arm   Patient Position: Sitting   Pulse: 67   SpO2: 98%   Weight: 76.9 kg (169 lb 8.5 oz)       Physical Exam  Vitals and nursing note reviewed.   Constitutional:       Appearance: She is well-developed.   HENT:      Head: Normocephalic and  atraumatic.   Eyes:      Conjunctiva/sclera: Conjunctivae normal.   Cardiovascular:      Rate and Rhythm: Normal rate and regular rhythm.      Heart sounds: Normal heart sounds.   Pulmonary:      Effort: Pulmonary effort is normal.      Breath sounds: Normal breath sounds.   Abdominal:      General: Bowel sounds are normal.      Palpations: Abdomen is soft.   Musculoskeletal:         General: Normal range of motion.   Skin:     General: Skin is warm and dry.   Neurological:      Mental Status: She is alert and oriented to person, place, and time.   Psychiatric:         Behavior: Behavior normal.         Thought Content: Thought content normal.         Judgment: Judgment normal.       CMP  Sodium   Date Value Ref Range Status   12/09/2024 138 136 - 145 mmol/L Final   01/11/2017 140 134 - 144 mmol/L      Potassium   Date Value Ref Range Status   12/09/2024 4.4 3.5 - 5.1 mmol/L Final     Chloride   Date Value Ref Range Status   12/09/2024 106 95 - 110 mmol/L Final   01/11/2017 104 98 - 110 mmol/L      CO2   Date Value Ref Range Status   12/09/2024 28 23 - 29 mmol/L Final     Glucose   Date Value Ref Range Status   12/09/2024 99 70 - 110 mg/dL Final   01/11/2017 87 70 - 99 mg/dL      BUN   Date Value Ref Range Status   12/09/2024 28 (H) 8 - 23 mg/dL Final     Creatinine   Date Value Ref Range Status   12/09/2024 0.9 0.5 - 1.4 mg/dL Final   01/11/2017 0.76 0.60 - 1.40 mg/dL      Calcium   Date Value Ref Range Status   12/09/2024 10.0 8.7 - 10.5 mg/dL Final     Total Protein   Date Value Ref Range Status   12/09/2024 6.6 6.0 - 8.4 g/dL Final     Albumin   Date Value Ref Range Status   12/09/2024 4.1 3.5 - 5.2 g/dL Final   01/11/2017 4.4 3.1 - 4.7 g/dL      Total Bilirubin   Date Value Ref Range Status   12/09/2024 0.4 0.1 - 1.0 mg/dL Final     Comment:     For infants and newborns, interpretation of results should be based  on gestational age, weight and in agreement with clinical  observations.    Premature Infant  "recommended reference ranges:  Up to 24 hours.............<8.0 mg/dL  Up to 48 hours............<12.0 mg/dL  3-5 days..................<15.0 mg/dL  6-29 days.................<15.0 mg/dL       Alkaline Phosphatase   Date Value Ref Range Status   12/09/2024 65 55 - 135 U/L Final     AST   Date Value Ref Range Status   12/09/2024 17 10 - 40 U/L Final     ALT   Date Value Ref Range Status   12/09/2024 17 10 - 44 U/L Final     Anion Gap   Date Value Ref Range Status   12/09/2024 4 (L) 8 - 16 mmol/L Final     eGFR if    Date Value Ref Range Status   09/30/2021 >60.0 >60 mL/min/1.73 m^2 Final     eGFR if non    Date Value Ref Range Status   09/30/2021 >60.0 >60 mL/min/1.73 m^2 Final     Comment:     Calculation used to obtain the estimated glomerular filtration  rate (eGFR) is the CKD-EPI equation.         BMP  Lab Results   Component Value Date     12/09/2024    K 4.4 12/09/2024     12/09/2024    CO2 28 12/09/2024    BUN 28 (H) 12/09/2024    CREATININE 0.9 12/09/2024    CALCIUM 10.0 12/09/2024    ANIONGAP 4 (L) 12/09/2024    ESTGFRAFRICA >60.0 09/30/2021    EGFRNONAA >60.0 09/30/2021      BNP  @LABRCNTIP(BNP,BNPTRIAGEBLO)@   Lab Results   Component Value Date    CHOL 202 (H) 11/02/2023    CHOL 159 04/28/2023    CHOL 216 (H) 09/30/2021     Lab Results   Component Value Date    HDL 82 (H) 11/02/2023    HDL 79 (H) 04/28/2023    HDL 81 (H) 09/30/2021     Lab Results   Component Value Date    LDLCALC 104.0 11/02/2023    LDLCALC 61.4 (L) 04/28/2023    LDLCALC 120.2 09/30/2021     Lab Results   Component Value Date    TRIG 80 11/02/2023    TRIG 93 04/28/2023    TRIG 74 09/30/2021     Lab Results   Component Value Date    CHOLHDL 40.6 11/02/2023    CHOLHDL 49.7 04/28/2023    CHOLHDL 37.5 09/30/2021      Lab Results   Component Value Date    TSH 2.244 11/02/2023     No results found for: "LABA1C", "HGBA1C"  Lab Results   Component Value Date    WBC 7.00 12/09/2024    HGB 14.1 12/09/2024 "    HCT 43.6 12/09/2024    MCV 91 12/09/2024     12/09/2024         No results found for this or any previous visit.     No results found for this or any previous visit.     EKG  Results for orders placed or performed during the hospital encounter of 02/19/24   EKG 12-lead    Collection Time: 02/19/24  9:28 AM   Result Value Ref Range    QRS Duration 78 ms    [facility] QTC Calculation 410 ms    Narrative    Test Reason : Z01.818,    Vent. Rate : 062 BPM     Atrial Rate : 062 BPM     P-R Int : 158 ms          QRS Dur : 078 ms      QT Int : 404 ms       P-R-T Axes : 053 031 044 degrees     QTc Int : 410 ms    Normal sinus rhythm  Normal ECG  When compared with ECG of 01-APR-2021 13:43,  No significant change was found  Confirmed by Gamaliel Hi MD ([id number]) on 2/29/2024 12:00:26 PM    Referred By:             Confirmed By:Gamaliel Hi MD      Stress  No results found for this or any previous visit.             Assessment:       Mixed hyperlipidemia  On 5 mg of rosuvastatin    Elevated coronary artery calcium score  Her coronary calcium score on 11/29/2018 was 35.  She denies any symptoms of angina.  She is on cholesterol therapy    Mild persistent asthma without complication  Followed by Pulmonary       Plan:           Continue her rosuvastatin and her asthma medication.  She will be seen in the office in 6 months with a CBC lipid profile CMP and a TSH.

## 2024-12-31 NOTE — ASSESSMENT & PLAN NOTE
Her coronary calcium score on 11/29/2018 was 35.  She denies any symptoms of angina.  She is on cholesterol therapy

## 2025-02-26 ENCOUNTER — OFFICE VISIT (OUTPATIENT)
Dept: PULMONOLOGY | Facility: CLINIC | Age: 70
End: 2025-02-26
Payer: MEDICARE

## 2025-02-26 VITALS
HEART RATE: 63 BPM | OXYGEN SATURATION: 96 % | WEIGHT: 171.75 LBS | DIASTOLIC BLOOD PRESSURE: 78 MMHG | HEIGHT: 65 IN | SYSTOLIC BLOOD PRESSURE: 144 MMHG | BODY MASS INDEX: 28.61 KG/M2

## 2025-02-26 DIAGNOSIS — J45.50 SEVERE PERSISTENT ASTHMA WITHOUT COMPLICATION: Primary | ICD-10-CM

## 2025-02-26 PROCEDURE — 99213 OFFICE O/P EST LOW 20 MIN: CPT | Mod: S$GLB,,, | Performed by: NURSE PRACTITIONER

## 2025-02-26 PROCEDURE — 3008F BODY MASS INDEX DOCD: CPT | Mod: CPTII,S$GLB,, | Performed by: NURSE PRACTITIONER

## 2025-02-26 PROCEDURE — 99999 PR PBB SHADOW E&M-EST. PATIENT-LVL IV: CPT | Mod: PBBFAC,,, | Performed by: NURSE PRACTITIONER

## 2025-02-26 PROCEDURE — 1159F MED LIST DOCD IN RCRD: CPT | Mod: CPTII,S$GLB,, | Performed by: NURSE PRACTITIONER

## 2025-02-26 PROCEDURE — 3077F SYST BP >= 140 MM HG: CPT | Mod: CPTII,S$GLB,, | Performed by: NURSE PRACTITIONER

## 2025-02-26 PROCEDURE — 3288F FALL RISK ASSESSMENT DOCD: CPT | Mod: CPTII,S$GLB,, | Performed by: NURSE PRACTITIONER

## 2025-02-26 PROCEDURE — 3078F DIAST BP <80 MM HG: CPT | Mod: CPTII,S$GLB,, | Performed by: NURSE PRACTITIONER

## 2025-02-26 PROCEDURE — 1101F PT FALLS ASSESS-DOCD LE1/YR: CPT | Mod: CPTII,S$GLB,, | Performed by: NURSE PRACTITIONER

## 2025-02-26 RX ORDER — FAMOTIDINE 40 MG/1
40 TABLET, FILM COATED ORAL
COMMUNITY
Start: 2025-01-15

## 2025-02-26 RX ORDER — FLUTICASONE FUROATE AND VILANTEROL 200; 25 UG/1; UG/1
1 POWDER RESPIRATORY (INHALATION) DAILY
Qty: 180 EACH | Refills: 3 | Status: SHIPPED | OUTPATIENT
Start: 2025-02-26

## 2025-02-26 RX ORDER — ALBUTEROL SULFATE 90 UG/1
2 INHALANT RESPIRATORY (INHALATION) EVERY 4 HOURS PRN
Qty: 18 G | Refills: 11 | Status: SHIPPED | OUTPATIENT
Start: 2025-02-26

## 2025-02-26 RX ORDER — AZITHROMYCIN 500 MG/1
500 TABLET, FILM COATED ORAL DAILY
Qty: 3 TABLET | Refills: 0 | Status: SHIPPED | OUTPATIENT
Start: 2025-02-26 | End: 2025-03-01

## 2025-02-26 RX ORDER — PREDNISONE 20 MG/1
TABLET ORAL
Qty: 21 TABLET | Refills: 0 | Status: SHIPPED | OUTPATIENT
Start: 2025-02-26
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
## 2025-02-26 NOTE — PATIENT INSTRUCTIONS
Will continue current Asthma medication regiment  use  Asthma Action plan as needed    Azithromycin 500 mg 1 pill for three days for yellow or green mucous    Prednisone 20 mg pills, Take one pill a day for three days, repeat for shortness of breath or wheeze    Albuterol Inhaler 1-2 puffs every 4 hours, for cough or shortness of breath

## 2025-04-02 DIAGNOSIS — Z78.0 MENOPAUSE: ICD-10-CM

## 2025-04-07 ENCOUNTER — PATIENT MESSAGE (OUTPATIENT)
Dept: ADMINISTRATIVE | Facility: HOSPITAL | Age: 70
End: 2025-04-07
Payer: MEDICARE

## 2025-04-17 DIAGNOSIS — J45.50 SEVERE PERSISTENT ASTHMA WITHOUT COMPLICATION: ICD-10-CM

## 2025-04-17 RX ORDER — BENRALIZUMAB 30 MG/ML
1 INJECTION, SOLUTION SUBCUTANEOUS
Qty: 1 ML | Refills: 5 | Status: ACTIVE | OUTPATIENT
Start: 2025-04-17

## 2025-05-19 DIAGNOSIS — J45.50 SEVERE PERSISTENT ASTHMA WITHOUT COMPLICATION: ICD-10-CM

## 2025-05-21 ENCOUNTER — TELEPHONE (OUTPATIENT)
Dept: PULMONOLOGY | Facility: CLINIC | Age: 70
End: 2025-05-21
Payer: MEDICARE

## 2025-05-21 NOTE — TELEPHONE ENCOUNTER
Called pt and explained that I recvd a refill request from [facility] but pharmacy is not listed on her profile   pt sd that she just got it filled at [facility]  advised would trash [facility] request  pt v/u

## 2025-05-26 RX ORDER — MIRABEGRON 50 MG/1
1 TABLET, FILM COATED, EXTENDED RELEASE ORAL DAILY
Qty: 90 TABLET | Refills: 0 | Status: SHIPPED | OUTPATIENT
Start: 2025-05-26 | End: 2025-05-27 | Stop reason: SDUPTHER

## 2025-05-29 ENCOUNTER — PATIENT MESSAGE (OUTPATIENT)
Dept: UROLOGY | Facility: CLINIC | Age: 70
End: 2025-05-29
Payer: MEDICARE

## 2025-05-29 RX ORDER — MIRABEGRON 50 MG/1
1 TABLET, FILM COATED, EXTENDED RELEASE ORAL DAILY
Qty: 90 TABLET | Refills: 0 | Status: SHIPPED | OUTPATIENT
Start: 2025-05-29

## 2025-05-29 NOTE — TELEPHONE ENCOUNTER
This patient has not been seen by urology in over a year.      A prescription was refilled for mybetriq 90 days with NO refills but the patient will need a one year FOLLOW UP with me or urology nurse practitioner (whomever has first availability).    Patient can ask their pcp to refill until then.     Please see their last note and see if they needed labs and/or imaging and confirm the patient was not discharged from our clinic back to their pcp.

## 2025-06-11 ENCOUNTER — PATIENT MESSAGE (OUTPATIENT)
Dept: CARDIOLOGY | Facility: CLINIC | Age: 70
End: 2025-06-11
Payer: MEDICARE

## 2025-07-02 ENCOUNTER — LAB VISIT (OUTPATIENT)
Dept: LAB | Facility: HOSPITAL | Age: 70
End: 2025-07-02
Attending: INTERNAL MEDICINE
Payer: MEDICARE

## 2025-07-02 DIAGNOSIS — E78.00 HYPERCHOLESTEROLEMIA: ICD-10-CM

## 2025-07-02 DIAGNOSIS — I49.9 VENTRICULAR ARRHYTHMIA: ICD-10-CM

## 2025-07-02 DIAGNOSIS — J45.30 MILD PERSISTENT ASTHMA WITHOUT COMPLICATION: ICD-10-CM

## 2025-07-02 DIAGNOSIS — R93.1 ELEVATED CORONARY ARTERY CALCIUM SCORE: ICD-10-CM

## 2025-07-02 LAB
ALBUMIN SERPL-MCNC: 4.3 G/DL (ref 3.5–5.2)
ALP SERPL-CCNC: 70 UNIT/L (ref 55–135)
ALT SERPL-CCNC: 16 UNIT/L (ref 10–44)
ANION GAP (SMH): 5 MMOL/L (ref 8–16)
AST SERPL-CCNC: 16 UNIT/L (ref 10–40)
BILIRUB SERPL-MCNC: 0.5 MG/DL (ref 0.1–1)
BUN SERPL-MCNC: 14 MG/DL (ref 8–23)
CALCIUM SERPL-MCNC: 10.6 MG/DL (ref 8.7–10.5)
CHLORIDE SERPL-SCNC: 108 MMOL/L (ref 95–110)
CHOLEST SERPL-MCNC: 191 MG/DL (ref 120–199)
CHOLEST/HDLC SERPL: 2.2 {RATIO} (ref 2–5)
CO2 SERPL-SCNC: 29 MMOL/L (ref 23–29)
CREAT SERPL-MCNC: 1 MG/DL (ref 0.5–1.4)
ERYTHROCYTE [DISTWIDTH] IN BLOOD BY AUTOMATED COUNT: 14.5 % (ref 11.5–14.5)
GFR SERPLBLD CREATININE-BSD FMLA CKD-EPI: >60 ML/MIN/1.73/M2
GLUCOSE SERPL-MCNC: 106 MG/DL (ref 70–110)
HCT VFR BLD AUTO: 44.6 % (ref 37–48.5)
HDLC SERPL-MCNC: 87 MG/DL (ref 40–75)
HDLC SERPL: 45.5 % (ref 20–50)
HGB BLD-MCNC: 14.3 GM/DL (ref 12–16)
LDLC SERPL CALC-MCNC: 78.6 MG/DL (ref 63–159)
MCH RBC QN AUTO: 28.7 PG (ref 27–31)
MCHC RBC AUTO-ENTMCNC: 32.1 G/DL (ref 32–36)
MCV RBC AUTO: 90 FL (ref 82–98)
NONHDLC SERPL-MCNC: 104 MG/DL
PLATELET # BLD AUTO: 315 K/UL (ref 150–450)
PMV BLD AUTO: 9.8 FL (ref 9.2–12.9)
POTASSIUM SERPL-SCNC: 4.5 MMOL/L (ref 3.5–5.1)
PROT SERPL-MCNC: 6.6 GM/DL (ref 6–8.4)
RBC # BLD AUTO: 4.98 M/UL (ref 4–5.4)
SODIUM SERPL-SCNC: 142 MMOL/L (ref 136–145)
TRIGL SERPL-MCNC: 127 MG/DL (ref 30–150)
TSH SERPL-ACNC: 2.29 UIU/ML (ref 0.34–5.6)
WBC # BLD AUTO: 6.29 K/UL (ref 3.9–12.7)

## 2025-07-02 PROCEDURE — 80053 COMPREHEN METABOLIC PANEL: CPT

## 2025-07-02 PROCEDURE — 36415 COLL VENOUS BLD VENIPUNCTURE: CPT

## 2025-07-02 PROCEDURE — 85027 COMPLETE CBC AUTOMATED: CPT

## 2025-07-02 PROCEDURE — 84443 ASSAY THYROID STIM HORMONE: CPT

## 2025-07-02 PROCEDURE — 80061 LIPID PANEL: CPT

## 2025-07-07 ENCOUNTER — OFFICE VISIT (OUTPATIENT)
Dept: CARDIOLOGY | Facility: CLINIC | Age: 70
End: 2025-07-07
Payer: MEDICARE

## 2025-07-07 VITALS
SYSTOLIC BLOOD PRESSURE: 122 MMHG | BODY MASS INDEX: 29.26 KG/M2 | WEIGHT: 175.63 LBS | OXYGEN SATURATION: 94 % | HEIGHT: 65 IN | DIASTOLIC BLOOD PRESSURE: 60 MMHG | HEART RATE: 87 BPM

## 2025-07-07 DIAGNOSIS — E78.2 MIXED HYPERLIPIDEMIA: ICD-10-CM

## 2025-07-07 DIAGNOSIS — R93.1 ELEVATED CORONARY ARTERY CALCIUM SCORE: ICD-10-CM

## 2025-07-07 DIAGNOSIS — I49.9 VENTRICULAR ARRHYTHMIA: Primary | ICD-10-CM

## 2025-07-07 DIAGNOSIS — J45.30 MILD PERSISTENT ASTHMA WITHOUT COMPLICATION: ICD-10-CM

## 2025-07-07 PROCEDURE — 3078F DIAST BP <80 MM HG: CPT | Mod: CPTII,S$GLB,, | Performed by: INTERNAL MEDICINE

## 2025-07-07 PROCEDURE — 99213 OFFICE O/P EST LOW 20 MIN: CPT | Mod: S$GLB,,, | Performed by: INTERNAL MEDICINE

## 2025-07-07 PROCEDURE — 3288F FALL RISK ASSESSMENT DOCD: CPT | Mod: CPTII,S$GLB,, | Performed by: INTERNAL MEDICINE

## 2025-07-07 PROCEDURE — 3008F BODY MASS INDEX DOCD: CPT | Mod: CPTII,S$GLB,, | Performed by: INTERNAL MEDICINE

## 2025-07-07 PROCEDURE — 1101F PT FALLS ASSESS-DOCD LE1/YR: CPT | Mod: CPTII,S$GLB,, | Performed by: INTERNAL MEDICINE

## 2025-07-07 PROCEDURE — 1159F MED LIST DOCD IN RCRD: CPT | Mod: CPTII,S$GLB,, | Performed by: INTERNAL MEDICINE

## 2025-07-07 PROCEDURE — 3074F SYST BP LT 130 MM HG: CPT | Mod: CPTII,S$GLB,, | Performed by: INTERNAL MEDICINE

## 2025-07-07 PROCEDURE — 1160F RVW MEDS BY RX/DR IN RCRD: CPT | Mod: CPTII,S$GLB,, | Performed by: INTERNAL MEDICINE

## 2025-07-07 PROCEDURE — 99999 PR PBB SHADOW E&M-EST. PATIENT-LVL IV: CPT | Mod: PBBFAC,,, | Performed by: INTERNAL MEDICINE

## 2025-07-07 PROCEDURE — 1126F AMNT PAIN NOTED NONE PRSNT: CPT | Mod: CPTII,S$GLB,, | Performed by: INTERNAL MEDICINE

## 2025-07-07 RX ORDER — SERTRALINE HYDROCHLORIDE 25 MG/1
25 TABLET, FILM COATED ORAL DAILY
Qty: 30 TABLET | Refills: 11 | Status: SHIPPED | OUTPATIENT
Start: 2025-07-07 | End: 2026-07-07

## 2025-07-07 NOTE — PROGRESS NOTES
"     Patient ID:  Eboni Reeves is a 70 y.o. female who presents with Hyperlipidemia and Results (labs)      History of Present Illness    CHIEF COMPLAINT:  Patient presents today for follow up after recent loss of father.    MENTAL HEALTH:  She reports her father passed away on May 13th at age 91, describing this as her "first broken heart." She is experiencing significant emotional distress and anxiety related to the loss. While prescribed Xanax for anxiety management, she does not take it regularly due to experiencing hangover-like symptoms the following day.    CARDIAC HISTORY:  She has had a murmur since age 10, which she reports as a long-standing condition not perceived as serious.    CURRENT MEDICATIONS:  She currently takes Pepcid, Albuterol, Prednisone, Progesterone, Rosuvastatin, Spironolactone, thyroid medication, and Tamsulosin. She denies current use of Zoloft.    LABS:  Recent lab studies showed normal sodium, potassium, chloride, renal function, liver function, thyroid function, and blood count. Blood sugar was 106 mg/dL. Lipid panel showed total cholesterol of 191 mg/dL, LDL 76 mg/dL, and HDL 87 mg/dL.      ROS:  Cardiovascular: -chest pain  Respiratory: -shortness of breath  Psychiatric: +anxiety           Past Medical History:   Diagnosis Date    Asthma     Cystitis, interstitial     DVT (deep venous thrombosis)     Encounter for blood transfusion     Fx     RIGHT ANKLE    Hyperlipidemia     LVH (left ventricular hypertrophy)     Renal disorder     STONE    Stroke     AGE 20    TMJ (dislocation of temporomandibular joint)     Urinary tract infection         Past Surgical History:   Procedure Laterality Date    APPENDECTOMY      BLADDER SURGERY  2021    kidney stone removal    COLONOSCOPY N/A 02/23/2024    Procedure: COLONOSCOPY;  Surgeon: Shagufta Escobar MD;  Location: [facility];  Service: Endoscopy;  Laterality: N/A;    CYSTOURETEROSCOPY WITH RETROGRADE PYELOGRAPHY AND INSERTION OF STENT INTO " URETER Right 05/05/2021    Procedure: CYSTOURETEROSCOPY, WITH RETROGRADE PYELOGRAM AND URETERAL STENT INSERTION;  Surgeon: Kelsey Winkler MD;  Location: [facility] OR;  Service: Urology;  Laterality: Right;    EYE SURGERY  2014    cataract    FRACTURE SURGERY  2005    ankle /wrist    HYSTERECTOMY      partial hysterectomy    KNEE CARTILAGE SURGERY Left 03/2024    LASER LITHOTRIPSY Right 05/05/2021    Procedure: LITHOTRIPSY, USING LASER;  Surgeon: Kelsey Winkler MD;  Location: [facility] OR;  Service: Urology;  Laterality: Right;    MANDIBLE FRACTURE SURGERY      TONSILLECTOMY      TOTAL KNEE ARTHROPLASTY Left 08/01/2024    WRIST SURGERY      right wrist, had plate put in          Current Outpatient Medications   Medication Instructions    ABRYSVO 120 mcg/0.5 mL SolR vaccine 0.5 mLs    albuterol (PROVENTIL/VENTOLIN HFA) 90 mcg/actuation inhaler 2 puffs, Inhalation, Every 4 hours PRN, Rescue     benralizumab (FASENRA PEN) 30 mg/mL AtIn 1 mL, Subcutaneous, Every 8 weeks    famotidine (PEPCID) 40 mg    FLUAD QUAD 2023-24,65Y UP,,PF, 60 mcg (15 mcg x 4)/0.5 mL Syrg     fluticasone furoate-vilanteroL (BREO ELLIPTA) 200-25 mcg/dose DsDv diskus inhaler 1 puff, Inhalation, Daily, Controller    fluticasone propionate (FLONASE) 50 mcg, Each Nostril, Daily    HYDROcodone-acetaminophen (NORCO) 5-325 mg per tablet 1 tablet, Oral, Every 6 hours PRN    lansoprazole (PREVACID) 30 mg, Oral, Daily    meclizine (ANTIVERT) 25 mg, Oral, 3 times daily PRN    mupirocin (BACTROBAN) 2 % ointment Topical (Top), 3 times daily    mupirocin (BACTROBAN) 2 % ointment Nasal, 3 times daily    MYRBETRIQ 50 mg, Oral, Daily    ondansetron (ZOFRAN-ODT) 4 mg, Oral, Every 6 hours PRN    PLENVU 140-9-5.2 gram PPkS     progesterone (PROMETRIUM) 200 MG capsule TAKE 1 CAPSULE BY MOUTH ONCE IN THE MORNING    rosuvastatin (CRESTOR) 5 mg, Oral, Daily    sertraline (ZOLOFT) 25 mg, Oral, Daily    spironolactone (ALDACTONE) 50 mg, Daily    SYNVISC-ONE 48 mg  "   tamsulosin (FLOMAX) 0.4 mg, Oral, Daily    thyroid (pork) (ARMOUR THYROID) 30 mg, Daily        Review of patient's allergies indicates:  No Known Allergies        Objective:     Vitals:    07/07/25 1308   BP: 122/60   BP Location: Left arm   Patient Position: Sitting   Pulse: 87   SpO2: (!) 94%   Weight: 79.6 kg (175 lb 9.5 oz)   Height: 5' 4.5" (1.638 m)        Physical Exam    General: No acute distress. Well-developed. Well-nourished.  Eyes: EOMI. Sclerae anicteric.  HENT: Normocephalic. Atraumatic. Nares patent. Moist oral mucosa.  Cardiovascular: Regular rate. Regular rhythm. Grade 2/6 systolic murmur at the base. No rubs. No gallops. Normal S1, S2.  Respiratory: Normal respiratory effort. Clear to auscultation bilaterally. No rales. No rhonchi. No wheezing.  Musculoskeletal: No  obvious deformity.  Extremities: No lower extremity edema.  Neurological: Alert & oriented x3. No slurred speech. Normal gait.  Psychiatric:  Mildly depressed and anxious. Normal affect. Good insight. Good judgment.  Skin: Warm. Dry. No rash.         CARDIAC PROFILE  No results found for: "BNP", "CPK", "CPKMB", "LDH", "TROPONINI", "TROPONINIHS"  CMP  Sodium   Date Value Ref Range Status   07/02/2025 142 136 - 145 mmol/L Final   01/11/2017 140 134 - 144 mmol/L      Potassium   Date Value Ref Range Status   07/02/2025 4.5 3.5 - 5.1 mmol/L Final     Chloride   Date Value Ref Range Status   07/02/2025 108 95 - 110 mmol/L Final   01/11/2017 104 98 - 110 mmol/L      CO2   Date Value Ref Range Status   07/02/2025 29 23 - 29 mmol/L Final     Glucose   Date Value Ref Range Status   07/02/2025 106 70 - 110 mg/dL Final   01/11/2017 87 70 - 99 mg/dL      BUN   Date Value Ref Range Status   07/02/2025 14 8 - 23 mg/dL Final     Creatinine   Date Value Ref Range Status   07/02/2025 1.0 0.5 - 1.4 mg/dL Final   01/11/2017 0.76 0.60 - 1.40 mg/dL      Calcium   Date Value Ref Range Status   07/02/2025 10.6 (H) 8.7 - 10.5 mg/dL Final     Protein Total " "  Date Value Ref Range Status   07/02/2025 6.6 6.0 - 8.4 gm/dL Final     Albumin   Date Value Ref Range Status   07/02/2025 4.3 3.5 - 5.2 g/dL Final   01/11/2017 4.4 3.1 - 4.7 g/dL      Bilirubin Total   Date Value Ref Range Status   07/02/2025 0.5 0.1 - 1.0 mg/dL Final     Comment:     For infants and newborns, interpretation of results should be based   on gestational age, weight and in agreement with clinical   observations.    Premature Infant recommended reference ranges:   0-24 hours:  <8.0 mg/dL   24-48 hours: <12.0 mg/dL   3-5 days:    <15.0 mg/dL   6-29 days:   <15.0 mg/dL     ALP   Date Value Ref Range Status   07/02/2025 70 55 - 135 unit/L Final     AST   Date Value Ref Range Status   07/02/2025 16 10 - 40 unit/L Final     ALT   Date Value Ref Range Status   07/02/2025 16 10 - 44 unit/L Final     Anion Gap   Date Value Ref Range Status   07/02/2025 5 (L) 8 - 16 mmol/L Final     eGFR   Date Value Ref Range Status   07/02/2025 >60 >60 mL/min/1.73/m2 Final   12/09/2024 >60.0 >60 mL/min/1.73 m^2 Final      Lab Results   Component Value Date    CHOL 191 07/02/2025    CHOL 202 (H) 11/02/2023    CHOL 159 04/28/2023     Lab Results   Component Value Date    HDL 87 (H) 07/02/2025    HDL 82 (H) 11/02/2023    HDL 79 (H) 04/28/2023     Lab Results   Component Value Date    LDLCALC 78.6 07/02/2025    LDLCALC 104.0 11/02/2023    LDLCALC 61.4 (L) 04/28/2023     Lab Results   Component Value Date    TRIG 127 07/02/2025    TRIG 80 11/02/2023    TRIG 93 04/28/2023     Lab Results   Component Value Date    CHOLHDL 45.5 07/02/2025    CHOLHDL 40.6 11/02/2023    CHOLHDL 49.7 04/28/2023      Lab Results   Component Value Date    TSH 2.286 07/02/2025     No results found for: "LABA1C", "HGBA1C"  Lab Results   Component Value Date    WBC 6.29 07/02/2025    HGB 14.3 07/02/2025    HCT 44.6 07/02/2025    MCV 90 07/02/2025     07/02/2025        No results found for this or any previous visit.     No results found for this or " any previous visit.       EKG  Results for orders placed or performed during the hospital encounter of 02/19/24   EKG 12-lead    Collection Time: 02/19/24  9:28 AM   Result Value Ref Range    QRS Duration 78 ms    [facility] QTC Calculation 410 ms    Narrative    Test Reason : Z01.818,    Vent. Rate : 062 BPM     Atrial Rate : 062 BPM     P-R Int : 158 ms          QRS Dur : 078 ms      QT Int : 404 ms       P-R-T Axes : 053 031 044 degrees     QTc Int : 410 ms    Normal sinus rhythm  Normal ECG  When compared with ECG of 01-APR-2021 13:43,  No significant change was found  Confirmed by Gamaliel Hi MD ([id number]) on 2/29/2024 12:00:26 PM    Referred By:             Confirmed By:Gamailel Hi MD        Stress  No results found for this or any previous visit.           Assessment/Plan:          1. Ventricular arrhythmia    2. Elevated coronary artery calcium score    3. Mild persistent asthma without complication    4. Mixed hyperlipidemia      Assessment & Plan    R01.0 Benign and innocent cardiac murmurs  R73.01 Impaired fasting glucose  F43.20 Adjustment disorder, unspecified  Z63.4 Disappearance and death of family member    PLAN SUMMARY:  - Check cholesterol levels at next visit  - Initiated SSRI (e.g., Zoloft) for grief and anxiety management  - Continued Pepcid, Albuterol, progesterone, rosuvastatin, spironolactone, thyroid medication, tamsulosin  - Follow up in 6 months    CARDIAC MURMUR:  - Longstanding Grade 2/6 systolic murmur at the base noted.    ADJUSTMENT DISORDER:  - Initiated antidepressant therapy (SSRI, e.g., Zoloft) for managing grief and anxiety over as-needed anxiolytic (Xanax) due to side effects and potential for longer-term benefit.  - Explained difference between anxiolytics (e.g., Xanax) and antidepressants (e.g., Zoloft).  - Discussed expected timeline for antidepressant efficacy, noting initial anxiety reduction and full mood improvement in 2-3 weeks.    MEDICATION MANAGEMENT:  - Continued  Pepcid, Albuterol, progesterone, rosuvastatin, spironolactone, thyroid medication, tamsulosin.  - Will check cholesterol levels at next visit.    FOLLOW-UP:  - Follow up in 6 months.           This note was generated with the assistance of ambient listening technology. Verbal consent was obtained by the patient and accompanying visitor(s) for the recording of patient appointment to facilitate this note. I attest to having reviewed and edited the generated note for accuracy, though some syntax or spelling errors may persist. Please contact the author of this note for any clarification.

## 2025-08-04 RX ORDER — MIRABEGRON 50 MG/1
1 TABLET, FILM COATED, EXTENDED RELEASE ORAL DAILY
Qty: 90 TABLET | Refills: 0 | Status: SHIPPED | OUTPATIENT
Start: 2025-08-04

## 2025-08-05 ENCOUNTER — OFFICE VISIT (OUTPATIENT)
Dept: UROLOGY | Facility: CLINIC | Age: 70
End: 2025-08-05
Payer: MEDICARE

## 2025-08-05 ENCOUNTER — HOSPITAL ENCOUNTER (OUTPATIENT)
Dept: RADIOLOGY | Facility: HOSPITAL | Age: 70
Discharge: HOME | End: 2025-08-05
Attending: NURSE PRACTITIONER
Payer: MEDICARE

## 2025-08-05 ENCOUNTER — RESULTS FOLLOW-UP (OUTPATIENT)
Dept: UROLOGY | Facility: CLINIC | Age: 70
End: 2025-08-05

## 2025-08-05 VITALS — BODY MASS INDEX: 26.68 KG/M2 | WEIGHT: 170 LBS | HEIGHT: 67 IN

## 2025-08-05 DIAGNOSIS — N22 CALCULUS OF URINARY TRACT IN DISEASES CLASSIFIED ELSEWHERE: ICD-10-CM

## 2025-08-05 DIAGNOSIS — R10.9 ABDOMINAL PAIN, UNSPECIFIED ABDOMINAL LOCATION: ICD-10-CM

## 2025-08-05 DIAGNOSIS — R39.89 BLADDER PAIN: ICD-10-CM

## 2025-08-05 DIAGNOSIS — R39.9 UTI SYMPTOMS: Primary | ICD-10-CM

## 2025-08-05 PROCEDURE — 74176 CT ABD & PELVIS W/O CONTRAST: CPT | Mod: 26,,, | Performed by: RADIOLOGY

## 2025-08-05 PROCEDURE — 87086 URINE CULTURE/COLONY COUNT: CPT | Performed by: NURSE PRACTITIONER

## 2025-08-05 PROCEDURE — 74176 CT ABD & PELVIS W/O CONTRAST: CPT | Mod: TC

## 2025-08-05 PROCEDURE — 99999 PR PBB SHADOW E&M-EST. PATIENT-LVL IV: CPT | Mod: PBBFAC,,, | Performed by: NURSE PRACTITIONER

## 2025-08-05 RX ORDER — CIPROFLOXACIN 250 MG/1
250 TABLET, FILM COATED ORAL 2 TIMES DAILY
Qty: 20 TABLET | Refills: 0 | Status: SHIPPED | OUTPATIENT
Start: 2025-08-05 | End: 2025-08-15

## 2025-08-05 NOTE — PROGRESS NOTES
[facility] Note  Staff: ANN Byrd    PCP: KATIA Guerra  :  PRASHANT Winkler    Chief Complaint: UTI symptoms    Subjective:        HPI: Eboni Reeves is a 70 y.o. female presents today for bladder pain, UTI symptoms at this time.  She is currently taking Pyridium prn bladder pain at this time.     HX:  Interval history by Peterson in CLINIC on 10/17/23 for f/u nocturia:  She had uti sx in august 2022. Cath ucx was neg. Since I saw her last 2 years ago she has been on myrbetriq.  She only wakes up 1-2 times a night previously was waking up 4-6 times a night.  She urinates about 3 times a day and has no incontinence.  When she has missed a dose she definitely wakes up more times.  She is not had a UTI in over a year.  No Interstitial Cystitis episodes in over a year. No stone episodes  Ua today neg. Pvr: 15  Taking care of her 88 and 90 yo parents.      Urine history:  10/17/23          Neg, pvr: 15  8/1/22              Ng  6/3/22              E.FAECALIS  5/13/21            Mod bld/small wbc  5/12/21            Cath: 2+bld/tr leuk, void: 1+leuk/3+bld/2+prot   5/3/21              Cath: 3+bld, void:      UA 11/6/23 is trace leuks, otherwise normal findings.  Also c/o right lower quadrant abdominal pain.  She has been taking AZO prn pain.  No gross hematuria noted by pt.     Interval history 2/8/24 (Aman MONTALVO)  Pt was seen in ED 2/6/24 for left flank pain, urinary frequency and urgency.  Micro UA RBC 58, UA 2+ blood, neg leuk or nitrite  Creatinine 1.1 / GFR 55  CBC WBC 10.95  CT RSS: left ureteral stone, 3.5 mm, mild hydro  Discharged home with flomax, norco, toradol, and zofran     Last kidney stone occurrence was 2021 with Dr Winkler     OV w/ Emily Newton NP  2/8/24:  Today, pt states left flank/lower abd pain is aching, cramping pain/ tolerable  Taking toradol as needed  She is taking flomax daily  Denies nausea or vomiting, has not needed zofran  Denies fever or  chills.  Denies difficulty voiding  Denies dysuria or gross hematuria  She is straining with each void but has not collected stone.    OV w/ M. O'Katherine, FNP  8/5/25:  Pt arrives to office with c/o UTI symptoms/Bladder pain.  No gross hematuria noted by pt.  She is currently taking Pyridium prn pain.  UA on arrival not enough to send off and contaminated.   exam with cath performed (see below)     REVIEW OF SYSTEMS: as stated above in hpi     PMHx:  Past Medical History:   Diagnosis Date    Asthma     Cystitis, interstitial     DVT (deep venous thrombosis)     Encounter for blood transfusion     Fx     RIGHT ANKLE    Hyperlipidemia     LVH (left ventricular hypertrophy)     Renal disorder     STONE    Stroke     AGE 20    TMJ (dislocation of temporomandibular joint)     Urinary tract infection      PSHx:  Past Surgical History:   Procedure Laterality Date    APPENDECTOMY      BLADDER SURGERY  2021    kidney stone removal    COLONOSCOPY N/A 02/23/2024    Procedure: COLONOSCOPY;  Surgeon: Shagufta Escobar MD;  Location: [facility];  Service: Endoscopy;  Laterality: N/A;    CYSTOURETEROSCOPY WITH RETROGRADE PYELOGRAPHY AND INSERTION OF STENT INTO URETER Right 05/05/2021    Procedure: CYSTOURETEROSCOPY, WITH RETROGRADE PYELOGRAM AND URETERAL STENT INSERTION;  Surgeon: Kelsey Winkler MD;  Location: [facility] OR;  Service: Urology;  Laterality: Right;    EYE SURGERY  2014    cataract    FRACTURE SURGERY  2005    ankle /wrist    HYSTERECTOMY      partial hysterectomy    KNEE CARTILAGE SURGERY Left 03/2024    LASER LITHOTRIPSY Right 05/05/2021    Procedure: LITHOTRIPSY, USING LASER;  Surgeon: Kelsey Winkler MD;  Location: [facility] OR;  Service: Urology;  Laterality: Right;    MANDIBLE FRACTURE SURGERY      TONSILLECTOMY      TOTAL KNEE ARTHROPLASTY Left 08/01/2024    WRIST SURGERY      right wrist, had plate put in     Allergies:  Patient has no known allergies.    Medications: reviewed   Objective:   There were  no vitals filed for this visit.    Physical Exam  Constitutional:       Appearance: She is well-developed.   HENT:      Head: Normocephalic and atraumatic.   Eyes:      Conjunctiva/sclera: Conjunctivae normal.      Pupils: Pupils are equal, round, and reactive to light.   Cardiovascular:      Rate and Rhythm: Normal rate and regular rhythm.      Heart sounds: Normal heart sounds.   Pulmonary:      Effort: Pulmonary effort is normal.      Breath sounds: Normal breath sounds.   Abdominal:      General: Bowel sounds are normal.      Palpations: Abdomen is soft.   Musculoskeletal:         General: Normal range of motion.      Cervical back: Normal range of motion and neck supple.   Skin:     General: Skin is warm and dry.   Neurological:      Mental Status: She is alert and oriented to person, place, and time.      Deep Tendon Reflexes: Reflexes are normal and symmetric.   Psychiatric:         Behavior: Behavior normal.         Thought Content: Thought content normal.         Judgment: Judgment normal.     Procedure Note:  After betadine irrigation of the urethra, lidocaine instilled via urojet by me. A #10 Macedonian straight tip catheter was inserted into the bladder with 30 mL of urine obtained.  Assessment:       1. UTI symptoms    2. Abdominal pain, unspecified abdominal location    3. Bladder pain    4. Calculus of urinary tract in diseases classified elsewhere          Plan:      Urine Culture from cath'd specimen to be processed at this time.  CT RSS to be done today to rule out obstructing stone at this time due to pt's history of stones.  Cipro 250 mg BID prescribed today for possible UTI until we receive urine cultures within 24-48 hrs.  Start D-Mannose daily and/or Cranberry capsules for bladder/UTI health.    I have spent over 30 min with this patient regarding discussion of the following:    -Discussed conservative measures to control urgency and frequency including   1. Avoiding/minimizing bladder irritants  (see below), especially in afternoon and evening hours     Discussed bladder irritants include coffee (even decaf), tea, alcohol, soda, spicy foods, acidic juices (orange, tomato), vinegar, and artificial sweeteners/sugary beverages.     2. Do Timed Voiding - empty on a schedule (approx ~2-3 hours) in spite of need to urinate, to get ahead of urge     3. Do not postponing voiding - dont hold it on purpose      4. Bowel regimen as distended bowel has extrinsic compressive effect on bladder.   - any or all of the following in any combination, titrate to soft daily bowel movement without pushing or straining  - colace/stool softener capsule - once to twice daily  - miralax - 1 capful daily to start, can increase to 2x daily (or decrease to 1/2 cap daily)  - increase dietary fibery  - fiber supplements, such as metamucil  - prunes, prune juice     5. INCREASE water intake     6. Stop fluids 2 hours before bed, and urinate just before bed      F/u TBD    [facility]: Active    Monalisa Marin, BEATRIZ-THI  Visit today is associated with current or anticipated ongoing medical care related to this patient's single serious condition/complex condition.

## 2025-08-06 LAB — BACTERIA UR CULT: NO GROWTH

## 2025-08-20 ENCOUNTER — PATIENT MESSAGE (OUTPATIENT)
Dept: ADMINISTRATIVE | Facility: HOSPITAL | Age: 70
End: 2025-08-20
Payer: MEDICARE

## 2025-08-27 ENCOUNTER — OFFICE VISIT (OUTPATIENT)
Dept: PULMONOLOGY | Facility: CLINIC | Age: 70
End: 2025-08-27
Payer: MEDICARE

## 2025-08-27 VITALS
OXYGEN SATURATION: 98 % | BODY MASS INDEX: 27.16 KG/M2 | WEIGHT: 173.06 LBS | HEIGHT: 67 IN | DIASTOLIC BLOOD PRESSURE: 79 MMHG | HEART RATE: 63 BPM | SYSTOLIC BLOOD PRESSURE: 137 MMHG

## 2025-08-27 DIAGNOSIS — G47.33 OBSTRUCTIVE SLEEP APNEA: ICD-10-CM

## 2025-08-27 DIAGNOSIS — J45.50 SEVERE PERSISTENT ASTHMA WITHOUT COMPLICATION: Primary | ICD-10-CM

## 2025-08-27 PROCEDURE — 3288F FALL RISK ASSESSMENT DOCD: CPT | Mod: CPTII,S$GLB,, | Performed by: NURSE PRACTITIONER

## 2025-08-27 PROCEDURE — 3008F BODY MASS INDEX DOCD: CPT | Mod: CPTII,S$GLB,, | Performed by: NURSE PRACTITIONER

## 2025-08-27 PROCEDURE — 1159F MED LIST DOCD IN RCRD: CPT | Mod: CPTII,S$GLB,, | Performed by: NURSE PRACTITIONER

## 2025-08-27 PROCEDURE — 3078F DIAST BP <80 MM HG: CPT | Mod: CPTII,S$GLB,, | Performed by: NURSE PRACTITIONER

## 2025-08-27 PROCEDURE — 1101F PT FALLS ASSESS-DOCD LE1/YR: CPT | Mod: CPTII,S$GLB,, | Performed by: NURSE PRACTITIONER

## 2025-08-27 PROCEDURE — 99213 OFFICE O/P EST LOW 20 MIN: CPT | Mod: S$GLB,,, | Performed by: NURSE PRACTITIONER

## 2025-08-27 PROCEDURE — 3075F SYST BP GE 130 - 139MM HG: CPT | Mod: CPTII,S$GLB,, | Performed by: NURSE PRACTITIONER

## 2025-08-27 PROCEDURE — 99999 PR PBB SHADOW E&M-EST. PATIENT-LVL IV: CPT | Mod: PBBFAC,,, | Performed by: NURSE PRACTITIONER

## 2025-08-27 RX ORDER — CELECOXIB 200 MG/1
200 CAPSULE ORAL
COMMUNITY
Start: 2025-05-05

## 2025-08-27 RX ORDER — HYALURONATE SODIUM, STABILIZED 60 MG/3 ML
SYRINGE (ML) INTRAARTICULAR
COMMUNITY
Start: 2025-03-18

## (undated) DEVICE — SCRUB HIBICLENS 4% CHG 4OZ

## (undated) DEVICE — SOL 9P NACL IRR PIC IL

## (undated) DEVICE — CATH POLLACK OPEN-END FLEXI-TI

## (undated) DEVICE — CONTAINER SPECIMEN STRL 4OZ

## (undated) DEVICE — FIBER LASER HOLMIUM 365 MICRON

## (undated) DEVICE — SLEEVE SCD EXPRESS KNEE MEDIUM

## (undated) DEVICE — SPONGE SUPER KERLIX 6X6.75IN

## (undated) DEVICE — SEE MEDLINE ITEM 157185

## (undated) DEVICE — SYR ONLY LUER LOCK 20CC

## (undated) DEVICE — GLOVE SURG ULTRA TOUCH 6

## (undated) DEVICE — LUBRICANT SURGILUBE 2 OZ

## (undated) DEVICE — WIRE GD LUB STD 3CM .035 150CM

## (undated) DEVICE — CATH URETERAL RUTNER 5 FR

## (undated) DEVICE — SET IRR URLGY 2LINE UNIV SPIKE

## (undated) DEVICE — SOL IRR NACL .9% 3000ML

## (undated) DEVICE — SEE MEDLINE ITEM 152487

## (undated) DEVICE — GUIDE WIRE MOTION .035 X 150CM

## (undated) DEVICE — SEE MEDLINE ITEM 157116

## (undated) DEVICE — EXTRACTOR TIPLESS 3FR 115 CM

## (undated) DEVICE — BAG LINGEMAN DRAIN UROLOGY